# Patient Record
Sex: MALE | Race: WHITE | Employment: OTHER | URBAN - METROPOLITAN AREA
[De-identification: names, ages, dates, MRNs, and addresses within clinical notes are randomized per-mention and may not be internally consistent; named-entity substitution may affect disease eponyms.]

---

## 2017-07-30 ENCOUNTER — HOSPITAL ENCOUNTER (EMERGENCY)
Age: 72
Discharge: HOME | End: 2017-07-30
Attending: INTERNAL MEDICINE | Admitting: INTERNAL MEDICINE
Payer: MEDICARE

## 2017-07-30 VITALS
HEART RATE: 63 BPM | SYSTOLIC BLOOD PRESSURE: 108 MMHG | TEMPERATURE: 97.6 F | BODY MASS INDEX: 32.93 KG/M2 | DIASTOLIC BLOOD PRESSURE: 68 MMHG | HEIGHT: 70 IN | RESPIRATION RATE: 13 BRPM | OXYGEN SATURATION: 96 % | WEIGHT: 230 LBS

## 2017-07-30 DIAGNOSIS — E87.6 HYPOKALEMIA: Primary | ICD-10-CM

## 2017-07-30 DIAGNOSIS — Z79.01 ON CONTINUOUS ORAL ANTICOAGULATION: ICD-10-CM

## 2017-07-30 DIAGNOSIS — Z86.79 HISTORY OF ATRIAL FIBRILLATION: ICD-10-CM

## 2017-07-30 LAB
ALBUMIN SERPL BCP-MCNC: 4 G/DL (ref 3.4–5)
ALBUMIN/GLOB SERPL: 1.3 {RATIO} (ref 0.8–1.7)
ALP SERPL-CCNC: 51 U/L (ref 45–117)
ALT SERPL-CCNC: 23 U/L (ref 16–61)
ANION GAP BLD CALC-SCNC: 6 MMOL/L (ref 3–18)
AST SERPL W P-5'-P-CCNC: 14 U/L (ref 15–37)
ATRIAL RATE: 75 BPM
BASOPHILS # BLD AUTO: 0.1 K/UL (ref 0–0.06)
BASOPHILS # BLD: 1 % (ref 0–2)
BILIRUB SERPL-MCNC: 0.5 MG/DL (ref 0.2–1)
BUN SERPL-MCNC: 18 MG/DL (ref 7–18)
BUN/CREAT SERPL: 15 (ref 12–20)
CALCIUM SERPL-MCNC: 9.1 MG/DL (ref 8.5–10.1)
CALCULATED P AXIS, ECG09: 32 DEGREES
CALCULATED R AXIS, ECG10: -17 DEGREES
CALCULATED T AXIS, ECG11: 55 DEGREES
CHLORIDE SERPL-SCNC: 102 MMOL/L (ref 100–108)
CK MB CFR SERPL CALC: NORMAL % (ref 0–4)
CK MB SERPL-MCNC: <1 NG/ML (ref 5–25)
CK SERPL-CCNC: 118 U/L (ref 39–308)
CO2 SERPL-SCNC: 30 MMOL/L (ref 21–32)
CREAT SERPL-MCNC: 1.17 MG/DL (ref 0.6–1.3)
DIAGNOSIS, 93000: NORMAL
DIFFERENTIAL METHOD BLD: ABNORMAL
EOSINOPHIL # BLD: 0.3 K/UL (ref 0–0.4)
EOSINOPHIL NFR BLD: 3 % (ref 0–5)
ERYTHROCYTE [DISTWIDTH] IN BLOOD BY AUTOMATED COUNT: 13 % (ref 11.6–14.5)
GLOBULIN SER CALC-MCNC: 3 G/DL (ref 2–4)
GLUCOSE SERPL-MCNC: 91 MG/DL (ref 74–99)
HCT VFR BLD AUTO: 42.6 % (ref 36–48)
HGB BLD-MCNC: 15.4 G/DL (ref 13–16)
LYMPHOCYTES # BLD AUTO: 31 % (ref 21–52)
LYMPHOCYTES # BLD: 2.2 K/UL (ref 0.9–3.6)
MAGNESIUM SERPL-MCNC: 2.2 MG/DL (ref 1.6–2.6)
MCH RBC QN AUTO: 32.4 PG (ref 24–34)
MCHC RBC AUTO-ENTMCNC: 36.2 G/DL (ref 31–37)
MCV RBC AUTO: 89.5 FL (ref 74–97)
MONOCYTES # BLD: 0.5 K/UL (ref 0.05–1.2)
MONOCYTES NFR BLD AUTO: 7 % (ref 3–10)
NEUTS SEG # BLD: 4.2 K/UL (ref 1.8–8)
NEUTS SEG NFR BLD AUTO: 58 % (ref 40–73)
P-R INTERVAL, ECG05: 170 MS
PLATELET # BLD AUTO: 221 K/UL (ref 135–420)
PMV BLD AUTO: 9.4 FL (ref 9.2–11.8)
POTASSIUM SERPL-SCNC: 3 MMOL/L (ref 3.5–5.5)
PROT SERPL-MCNC: 7 G/DL (ref 6.4–8.2)
Q-T INTERVAL, ECG07: 436 MS
QRS DURATION, ECG06: 108 MS
QTC CALCULATION (BEZET), ECG08: 486 MS
RBC # BLD AUTO: 4.76 M/UL (ref 4.7–5.5)
SODIUM SERPL-SCNC: 138 MMOL/L (ref 136–145)
TROPONIN I SERPL-MCNC: <0.02 NG/ML (ref 0–0.06)
VENTRICULAR RATE, ECG03: 75 BPM
WBC # BLD AUTO: 7.3 K/UL (ref 4.6–13.2)

## 2017-07-30 PROCEDURE — 99283 EMERGENCY DEPT VISIT LOW MDM: CPT

## 2017-07-30 PROCEDURE — 85025 COMPLETE CBC W/AUTO DIFF WBC: CPT | Performed by: PHYSICIAN ASSISTANT

## 2017-07-30 PROCEDURE — 80053 COMPREHEN METABOLIC PANEL: CPT | Performed by: PHYSICIAN ASSISTANT

## 2017-07-30 PROCEDURE — 93005 ELECTROCARDIOGRAM TRACING: CPT

## 2017-07-30 PROCEDURE — 83735 ASSAY OF MAGNESIUM: CPT | Performed by: PHYSICIAN ASSISTANT

## 2017-07-30 PROCEDURE — 82550 ASSAY OF CK (CPK): CPT | Performed by: PHYSICIAN ASSISTANT

## 2017-07-30 PROCEDURE — 74011250637 HC RX REV CODE- 250/637: Performed by: PHYSICIAN ASSISTANT

## 2017-07-30 RX ORDER — GLUCOSAMINE SULFATE 1500 MG
1000 POWDER IN PACKET (EA) ORAL DAILY
COMMUNITY

## 2017-07-30 RX ORDER — POTASSIUM CHLORIDE 20 MEQ/1
40 TABLET, EXTENDED RELEASE ORAL
Status: COMPLETED | OUTPATIENT
Start: 2017-07-30 | End: 2017-07-30

## 2017-07-30 RX ADMIN — POTASSIUM CHLORIDE 40 MEQ: 20 TABLET, EXTENDED RELEASE ORAL at 20:49

## 2017-07-30 NOTE — ED TRIAGE NOTES
Pt states \"I had an episode of atrial fibrillation earlier today. I did what my doctor told me to and took extra medications and it went away, but it came back again and I couldn't do anything about it then. I think I'm okay right now, but it is very disconcerting\". Took second dose of regular amiodarone around [id number]. Cardiologist: Sharee Christian. Pt states normally the second dose of medication keeps it away. Sepsis Screening completed    (  )Patient meets SIRS criteria. (  x)Patient does not meet SIRS criteria.       SIRS Criteria is achieved when two or more of the following are present   Temperature < 96.8°F (36°C) or > 100.9°F (38.3°C)   Heart Rate > 90 beats per minute   Respiratory Rate > 20 breaths per minute   WBC count > 12,000 or <4,000 or > 10% bands

## 2017-07-30 NOTE — ED NOTES
Pt presents to the ER with complaints of episodes of Afib earlier today. Pt states that he has a history of Afib for which he is followed by Dr Sharee Christian. Pt states that he was told by his cardiologist that if he has an episode of Afib he is to take 1 additional dose of amiodarone. Pt states that he did this today which converted patient back to NSR but patient states \"I am just concerned as why I keep going into Afib without any triggers. \" Pt is resting in a position of comfort on stretcher with spouse at bedside. Pt on cardiac monitor showing NSR and will continue to monitor patient. Bed in lowest locked position, side rails up x 1 and call bell in reach of patient and patient instructed to use call bell for any assistance needed.

## 2017-07-30 NOTE — ED PROVIDER NOTES
[address]  EMERGENCY DEPARTMENT HISTORY AND PHYSICAL EXAM       Date: 7/30/2017   Patient Name: Mary Augustin   YOB: 1945  Medical Record Number: [id number]    History of Presenting Illness     Chief Complaint   Patient presents with    Irregular Heart Beat        History Provided By:  patient    Additional History: 7:08 PM  Mary Augustin is a 73 y.o. male with Hx of A-fib for over 12 years (pt is on Amiodarone) who presents to the emergency department C/O \"irregular heart beat\" onset 5 hours ago while at lunch (resolved in ED). Pt took his Amiodarone which resolved the sx's for a few hours and then again the irregular heartbeat started 2 hours ago which is when pt decided to come to ED. Pt reports the last time this happened, months ago, he took an extra dose of Amiodarone, as told to do by  (cardio), which made sx's worse this is why he did not take extra medication today. Pt is on Xarelto. Pt reports cardiac stress test done 2 months ago was normal. Pt denies CP or SOB. Primary Care Provider: Arias Espinosa MD   Specialist:  (cardio)    Past History     Past Medical History:   Past Medical History:   Diagnosis Date    Acid reflux     Arthritis     Atrial fibrillation ([address]     Paroxysmal    Cancer ([address]     skin    Chest pain 2/9/2011    Depression     DVT of lower extremity (deep venous thrombosis) ([address]     Right    Hyperlipidemia     Hypopotassemia 6/6/2013    Long term (current) use of anticoagulants     LVH (left ventricular hypertrophy)     Near syncope 3/30/2014    Other and unspecified hyperlipidemia 1/28/2014    SOB (shortness of breath) 2/10/2011        Past Surgical History:   Past Surgical History:   Procedure Laterality Date    HX CATARACT REMOVAL  4-28-15    left eye    HX HERNIA REPAIR      Inguinal and umbilical        Family History:   History reviewed. No pertinent family history.      Social History:   Social History   Substance Use Topics    Smoking status: Former Smoker     Packs/day: 0.50     Years: 20.00     Types: Cigarettes     Quit date: 2/10/2005    Smokeless tobacco: Never Used    Alcohol use No        Allergies: Allergies   Allergen Reactions    Statins-Hmg-Coa Reductase Inhibitors Other (comments)     Body aches and tiredness    Tylenol [Acetaminophen] Other (comments)     Body aches and tiredness        Review of Systems   Review of Systems   Respiratory: Negative for shortness of breath. Cardiovascular: Negative for chest pain. All other systems reviewed and are negative. Physical Exam  Vitals:    07/30/17 1747 07/30/17 2045   BP: 108/68    Pulse: 91 63   Resp: 20 13   Temp: 97.6 °F (36.4 °C)    SpO2: 98% 96%   Weight: 104.3 kg (230 lb)    Height: 5' 10\" (1.778 m)        Physical Exam   Nursing note and vitals reviewed. Vital signs and nursing notes reviewed. CONSTITUTIONAL: Alert. Well-appearing; well-nourished; in no apparent distress. HEAD: Normocephalic; atraumatic. EYES: PERRL; Conjunctiva clear. ENT: Moist mucus membranes. NECK: Supple; FROM without difficulty, non-tender; no cervical lymphadenopathy. CV: Normal S1, S2; no murmurs, rubs, or gallops. No chest wall tenderness. RESPIRATORY: Normal chest excursion with respiration; breath sounds clear and equal bilaterally; no wheezes, rhonchi, or rales. GI: Normal bowel sounds; non-distended; non-tender; no guarding or rigidity; no palpable organomegaly. No CVA tenderness. EXT: Normal ROM in all four extremities; non-tender to palpation. No edema. No calf tenderness. SKIN: Normal for age and race; warm; dry; good turgor; no apparent lesions or exudate. NEURO: A & O x3. Cranial nerves 2-12 intact. Motor 5/5 bilaterally. Sensation intact. PSYCH:  Mood and affect appropriate.         Diagnostic Study Results     Labs -      Recent Results (from the past 12 hour(s))   EKG, 12 LEAD, INITIAL    Collection Time: 07/30/17  5:56 PM   Result Value Ref Range    Ventricular Rate 75 BPM    Atrial Rate 75 BPM    P-R Interval 170 ms    QRS Duration 108 ms    Q-T Interval 436 ms    QTC Calculation (Bezet) 486 ms    Calculated P Axis 32 degrees    Calculated R Axis -17 degrees    Calculated T Axis 55 degrees    Diagnosis       Normal sinus rhythm  Inferior infarct , age undetermined  Abnormal ECG  When compared with ECG of 18-FEB-2014 07:23,  Inferior infarct is now present     CBC WITH AUTOMATED DIFF    Collection Time: 07/30/17  7:30 PM   Result Value Ref Range    WBC 7.3 4.6 - 13.2 K/uL    RBC 4.76 4.70 - 5.50 M/uL    HGB 15.4 13.0 - 16.0 g/dL    HCT 42.6 36.0 - 48.0 %    MCV 89.5 74.0 - 97.0 FL    MCH 32.4 24.0 - 34.0 PG    MCHC 36.2 31.0 - 37.0 g/dL    RDW 13.0 11.6 - 14.5 %    PLATELET 075 926 - 566 K/uL    MPV 9.4 9.2 - 11.8 FL    NEUTROPHILS 58 40 - 73 %    LYMPHOCYTES 31 21 - 52 %    MONOCYTES 7 3 - 10 %    EOSINOPHILS 3 0 - 5 %    BASOPHILS 1 0 - 2 %    ABS. NEUTROPHILS 4.2 1.8 - 8.0 K/UL    ABS. LYMPHOCYTES 2.2 0.9 - 3.6 K/UL    ABS. MONOCYTES 0.5 0.05 - 1.2 K/UL    ABS. EOSINOPHILS 0.3 0.0 - 0.4 K/UL    ABS. BASOPHILS 0.1 (H) 0.0 - 0.06 K/UL    DF AUTOMATED     METABOLIC PANEL, COMPREHENSIVE    Collection Time: 07/30/17  7:30 PM   Result Value Ref Range    Sodium 138 136 - 145 mmol/L    Potassium 3.0 (L) 3.5 - 5.5 mmol/L    Chloride 102 100 - 108 mmol/L    CO2 30 21 - 32 mmol/L    Anion gap 6 3.0 - 18 mmol/L    Glucose 91 74 - 99 mg/dL    BUN 18 7.0 - 18 MG/DL    Creatinine 1.17 0.6 - 1.3 MG/DL    BUN/Creatinine ratio 15 12 - 20      GFR est AA >60 >60 ml/min/1.73m2    GFR est non-AA >60 >60 ml/min/1.73m2    Calcium 9.1 8.5 - 10.1 MG/DL    Bilirubin, total 0.5 0.2 - 1.0 MG/DL    ALT (SGPT) 23 16 - 61 U/L    AST (SGOT) 14 (L) 15 - 37 U/L    Alk.  phosphatase 51 45 - 117 U/L    Protein, total 7.0 6.4 - 8.2 g/dL    Albumin 4.0 3.4 - 5.0 g/dL    Globulin 3.0 2.0 - 4.0 g/dL    A-G Ratio 1.3 0.8 - 1.7     CARDIAC PANEL,(CK, CKMB & TROPONIN)    Collection Time: 07/30/17  7:30 PM   Result Value Ref Range     39 - 308 U/L    CK - MB <1.0 <3.6 ng/ml    CK-MB Index  0.0 - 4.0 %     CALCULATION NOT PERFORMED WHEN RESULT IS BELOW LINEAR LIMIT    Troponin-I, Qt. <0.02 0.00 - 0.06 NG/ML   MAGNESIUM    Collection Time: 07/30/17  7:30 PM   Result Value Ref Range    Magnesium 2.2 1.6 - 2.6 mg/dL       Radiologic Studies -  The following have been ordered and reviewed:  No orders to display           Medical Decision Making   I am the first provider for this patient. I reviewed the vital signs, available nursing notes, past medical history, past surgical history, family history and social history. Vital Signs-Reviewed the patient's vital signs. Patient Vitals for the past 12 hrs:   Temp Pulse Resp BP SpO2   07/30/17 2045 - 63 13 - 96 %   07/30/17 1747 97.6 °F (36.4 °C) 91 20 108/68 98 %     DDx: paroxysmal atrial fibrillation, electrolyte abnormality, medication adverse effect    Pulse Oximetry Analysis - Normal 98% on RA     Cardiac Monitor:   Rate: 75 bpm  Rhythm: Normal Sinus Rhythm     EKG interpretation: (Preliminary)  Rhythm: NSR. Rate (approx.): 75 bpm. ; inferior infarct. No stemi. EKG read by Tree Falcon MD  at 17:56    Procedures:   Procedures    ED Course:  7:08 M  Initial assessment performed. The patients presenting problems have been discussed, and they are in agreement with the care plan formulated and outlined with them. I have encouraged them to ask questions as they arise throughout their visit. Medications Given in the ED:  Medications   potassium chloride (K-DUR, KLOR-CON) SR tablet 40 mEq (40 mEq Oral Given 7/30/17 2049)       Discharge Note:  8:22 PM  Pt has been reexamined. Patient has no new complaints, changes, or physical findings. Care plan outlined and precautions discussed. Results were reviewed with the patient. All medications were reviewed with the patient; will d/c home.  All of pt's questions and concerns were addressed. Patient was instructed and agrees to follow up with cardiology, as well as to return to the ED upon further deterioration. Patient is ready to go home. Diagnosis     Clinical Impression:     1. Hypokalemia    2. History of atrial fibrillation    3. On continuous oral anticoagulation         Discussion:    Follow-up Information     Follow up With Details Comments [address], [place] Schedule an appointment as soon as possible for a visit in 1 day  [facility]  [address] [facility] EMERGENCY DEPT  As needed, If symptoms worsen [address] Cecily Umaña  [phone]          Current Discharge Medication List          _______________________________   Attestations: This note is prepared by Amado Newton , acting as a Scribe for [employer]  on 7:01 PM on 7/30/2017 . [employer] : The scribe's documentation has been prepared under my direction and personally reviewed by me in its entirety. I was personally available for consultation in the emergency department. I have reviewed the chart and agree with the documentation recorded by the [facility], including the assessment, treatment plan, and disposition.    _______________________________

## 2017-07-31 NOTE — DISCHARGE INSTRUCTIONS
Hypokalemia: Care Instructions  Your Care Instructions  Hypokalemia (say \"bx-ke-lwf-BIBIANA-jerson-uh\") is a low level of potassium. The heart, muscles, kidneys, and nervous system all need potassium to work well. This problem has many different causes. Kidney problems, diet, and medicines like diuretics and laxatives can cause it. So can vomiting or diarrhea. In some cases, cancer is the cause. Your doctor may do tests to find the cause of your low potassium levels. You may need medicines to bring your potassium levels back to normal. You may also need regular blood tests to check your potassium. If you have very low potassium, you may need intravenous (IV) medicines. You also may need tests to check the electrical activity of your heart. Heart problems caused by low potassium levels can be very serious. Follow-up care is a key part of your treatment and safety. Be sure to make and go to all appointments, and call your doctor if you are having problems. It's also a good idea to know your test results and keep a list of the medicines you take. How can you care for yourself at home? · If your doctor recommends it, eat foods that have a lot of potassium. These include fresh fruits, juices, and vegetables. They also include nuts, beans, and milk. · Be safe with medicines. If your doctor prescribes medicines or potassium supplements, take them exactly as directed. Call your doctor if you have any problems with your medicines. · Get your potassium levels tested as often as your doctor tells you. When should you call for help? Call 911 anytime you think you may need emergency care. For example, call if:  · You feel like your heart is missing beats. Heart problems caused by low potassium can cause death. · You passed out (lost consciousness). · You have a seizure. Call your doctor now or seek immediate medical care if:  · You feel weak or unusually tired. · You have severe arm or leg cramps.   · You have tingling or numbness. · You feel sick to your stomach, or you vomit. · You have belly cramps. · You feel bloated or constipated. · You have to urinate a lot. · You feel very thirsty most of the time. · You are dizzy or lightheaded, or you feel like you may faint. · You feel depressed, or you lose touch with reality. Watch closely for changes in your health, and be sure to contact your doctor if:  · You do not get better as expected. Where can you learn more? Go to http://rebecca-anabel.info/. Enter G358 in the search box to learn more about \"Hypokalemia: Care Instructions. \"  Current as of: July 28, 2016  Content Version: 11.3  © 9394-4132 [employer]. Care instructions adapted under license by [employer] (which disclaims liability or warranty for this information). If you have questions about a medical condition or this instruction, always ask your healthcare professional. [address] any warranty or liability for your use of this information.

## 2017-08-11 ENCOUNTER — HOSPITAL ENCOUNTER (OUTPATIENT)
Dept: PREADMISSION TESTING | Age: 72
Discharge: HOME | End: 2017-08-11
Payer: MEDICARE

## 2017-08-11 LAB
ANION GAP BLD CALC-SCNC: 9 MMOL/L (ref 3–18)
BUN SERPL-MCNC: 19 MG/DL (ref 7–18)
BUN/CREAT SERPL: 17 (ref 12–20)
CALCIUM SERPL-MCNC: 9.3 MG/DL (ref 8.5–10.1)
CHLORIDE SERPL-SCNC: 100 MMOL/L (ref 100–108)
CO2 SERPL-SCNC: 31 MMOL/L (ref 21–32)
CREAT SERPL-MCNC: 1.1 MG/DL (ref 0.6–1.3)
GLUCOSE SERPL-MCNC: 102 MG/DL (ref 74–99)
MAGNESIUM SERPL-MCNC: 2.1 MG/DL (ref 1.6–2.6)
POTASSIUM SERPL-SCNC: 3.9 MMOL/L (ref 3.5–5.5)
SODIUM SERPL-SCNC: 140 MMOL/L (ref 136–145)

## 2017-08-11 PROCEDURE — 80048 BASIC METABOLIC PNL TOTAL CA: CPT | Performed by: INTERNAL MEDICINE

## 2017-08-11 PROCEDURE — 83735 ASSAY OF MAGNESIUM: CPT | Performed by: INTERNAL MEDICINE

## 2017-08-11 PROCEDURE — 36415 COLL VENOUS BLD VENIPUNCTURE: CPT | Performed by: INTERNAL MEDICINE

## 2017-08-14 LAB
FAX TO INFO,FAXT: NORMAL
FAX TO NUMBER,FAXN: NORMAL

## 2018-06-05 ENCOUNTER — HOSPITAL ENCOUNTER (OUTPATIENT)
Dept: LAB | Age: 73
Discharge: HOME | End: 2018-06-05
Payer: MEDICARE

## 2018-06-05 LAB
ALBUMIN SERPL-MCNC: 3.9 G/DL (ref 3.4–5)
ALBUMIN/GLOB SERPL: 1.2 {RATIO} (ref 0.8–1.7)
ALP SERPL-CCNC: 59 U/L (ref 45–117)
ALT SERPL-CCNC: 24 U/L (ref 16–61)
ANION GAP SERPL CALC-SCNC: 9 MMOL/L (ref 3–18)
AST SERPL-CCNC: 15 U/L (ref 15–37)
BASOPHILS # BLD: 0.1 K/UL (ref 0–0.06)
BASOPHILS NFR BLD: 1 % (ref 0–2)
BILIRUB SERPL-MCNC: 0.8 MG/DL (ref 0.2–1)
BUN SERPL-MCNC: 19 MG/DL (ref 7–18)
BUN/CREAT SERPL: 13 (ref 12–20)
CALCIUM SERPL-MCNC: 9.1 MG/DL (ref 8.5–10.1)
CHLORIDE SERPL-SCNC: 104 MMOL/L (ref 100–108)
CO2 SERPL-SCNC: 31 MMOL/L (ref 21–32)
CREAT SERPL-MCNC: 1.45 MG/DL (ref 0.6–1.3)
DIFFERENTIAL METHOD BLD: ABNORMAL
EOSINOPHIL # BLD: 0.1 K/UL (ref 0–0.4)
EOSINOPHIL NFR BLD: 2 % (ref 0–5)
ERYTHROCYTE [DISTWIDTH] IN BLOOD BY AUTOMATED COUNT: 13.3 % (ref 11.6–14.5)
GLOBULIN SER CALC-MCNC: 3.3 G/DL (ref 2–4)
GLUCOSE SERPL-MCNC: 114 MG/DL (ref 74–99)
HCT VFR BLD AUTO: 49.2 % (ref 36–48)
HGB BLD-MCNC: 16.8 G/DL (ref 13–16)
LYMPHOCYTES # BLD: 1.9 K/UL (ref 0.9–3.6)
LYMPHOCYTES NFR BLD: 28 % (ref 21–52)
MCH RBC QN AUTO: 31.6 PG (ref 24–34)
MCHC RBC AUTO-ENTMCNC: 34.1 G/DL (ref 31–37)
MCV RBC AUTO: 92.7 FL (ref 74–97)
MONOCYTES # BLD: 0.4 K/UL (ref 0.05–1.2)
MONOCYTES NFR BLD: 6 % (ref 3–10)
NEUTS SEG # BLD: 4.2 K/UL (ref 1.8–8)
NEUTS SEG NFR BLD: 63 % (ref 40–73)
PLATELET # BLD AUTO: 223 K/UL (ref 135–420)
PMV BLD AUTO: 9.7 FL (ref 9.2–11.8)
POTASSIUM SERPL-SCNC: 3.3 MMOL/L (ref 3.5–5.5)
PROT SERPL-MCNC: 7.2 G/DL (ref 6.4–8.2)
RBC # BLD AUTO: 5.31 M/UL (ref 4.7–5.5)
SODIUM SERPL-SCNC: 144 MMOL/L (ref 136–145)
T3FREE SERPL-MCNC: 2.4 PG/ML (ref 2.18–3.98)
T4 FREE SERPL-MCNC: 1.1 NG/DL (ref 0.7–1.5)
TSH SERPL DL<=0.05 MIU/L-ACNC: 2.51 UIU/ML (ref 0.36–3.74)
WBC # BLD AUTO: 6.7 K/UL (ref 4.6–13.2)

## 2018-06-05 PROCEDURE — 84443 ASSAY THYROID STIM HORMONE: CPT | Performed by: INTERNAL MEDICINE

## 2018-06-05 PROCEDURE — 85025 COMPLETE CBC W/AUTO DIFF WBC: CPT | Performed by: INTERNAL MEDICINE

## 2018-06-05 PROCEDURE — 84439 ASSAY OF FREE THYROXINE: CPT | Performed by: INTERNAL MEDICINE

## 2018-06-05 PROCEDURE — 36415 COLL VENOUS BLD VENIPUNCTURE: CPT | Performed by: INTERNAL MEDICINE

## 2018-06-05 PROCEDURE — 84481 FREE ASSAY (FT-3): CPT | Performed by: INTERNAL MEDICINE

## 2018-06-05 PROCEDURE — 80053 COMPREHEN METABOLIC PANEL: CPT | Performed by: INTERNAL MEDICINE

## 2018-06-06 LAB
FAX TO INFO,FAXT: NORMAL
FAX TO NUMBER,FAXN: NORMAL

## 2019-04-16 ENCOUNTER — HOSPITAL ENCOUNTER (OUTPATIENT)
Dept: LAB | Age: 74
Discharge: HOME | End: 2019-04-16
Payer: MEDICARE

## 2019-04-16 LAB
T4 FREE SERPL-MCNC: 1 NG/DL (ref 0.7–1.5)
TSH SERPL DL<=0.05 MIU/L-ACNC: 2 UIU/ML (ref 0.36–3.74)

## 2019-04-16 PROCEDURE — 36415 COLL VENOUS BLD VENIPUNCTURE: CPT

## 2019-04-16 PROCEDURE — 84480 ASSAY TRIIODOTHYRONINE (T3): CPT

## 2019-04-16 PROCEDURE — 84439 ASSAY OF FREE THYROXINE: CPT

## 2019-04-16 PROCEDURE — 84443 ASSAY THYROID STIM HORMONE: CPT

## 2019-04-17 LAB — T3 SERPL-MCNC: 76 NG/DL (ref 71–180)

## 2019-04-18 LAB
FAX TO INFO,FAXT: NORMAL
FAX TO NUMBER,FAXN: NORMAL

## 2020-11-18 ENCOUNTER — HOSPITAL ENCOUNTER (OUTPATIENT)
Dept: LAB | Age: 75
Discharge: HOME | End: 2020-11-18
Payer: MEDICARE

## 2020-11-18 LAB
ALBUMIN SERPL-MCNC: 4.2 G/DL (ref 3.4–5)
ALBUMIN/GLOB SERPL: 1.4 {RATIO} (ref 0.8–1.7)
ALP SERPL-CCNC: 72 U/L (ref 45–117)
ALT SERPL-CCNC: 24 U/L (ref 16–61)
ANION GAP SERPL CALC-SCNC: 4 MMOL/L (ref 3–18)
AST SERPL-CCNC: 16 U/L (ref 10–38)
BILIRUB SERPL-MCNC: 0.6 MG/DL (ref 0.2–1)
BUN SERPL-MCNC: 21 MG/DL (ref 7–18)
BUN/CREAT SERPL: 20 (ref 12–20)
CALCIUM SERPL-MCNC: 9.7 MG/DL (ref 8.5–10.1)
CHLORIDE SERPL-SCNC: 104 MMOL/L (ref 100–111)
CO2 SERPL-SCNC: 32 MMOL/L (ref 21–32)
CREAT SERPL-MCNC: 1.04 MG/DL (ref 0.6–1.3)
GLOBULIN SER CALC-MCNC: 3.1 G/DL (ref 2–4)
GLUCOSE SERPL-MCNC: 110 MG/DL (ref 74–99)
POTASSIUM SERPL-SCNC: 4.3 MMOL/L (ref 3.5–5.5)
PROT SERPL-MCNC: 7.3 G/DL (ref 6.4–8.2)
SODIUM SERPL-SCNC: 140 MMOL/L (ref 136–145)
T4 FREE SERPL-MCNC: 1 NG/DL (ref 0.7–1.5)
TSH SERPL DL<=0.05 MIU/L-ACNC: 3.03 UIU/ML (ref 0.36–3.74)

## 2020-11-18 PROCEDURE — 36415 COLL VENOUS BLD VENIPUNCTURE: CPT

## 2020-11-18 PROCEDURE — 84480 ASSAY TRIIODOTHYRONINE (T3): CPT

## 2020-11-18 PROCEDURE — 80053 COMPREHEN METABOLIC PANEL: CPT

## 2020-11-18 PROCEDURE — 84439 ASSAY OF FREE THYROXINE: CPT

## 2020-11-18 PROCEDURE — 84443 ASSAY THYROID STIM HORMONE: CPT

## 2020-11-19 LAB
FAX TO INFO,FAXT: NORMAL
FAX TO NUMBER,FAXN: NORMAL
T3 SERPL-MCNC: 100 NG/DL (ref 71–180)

## 2020-12-22 ENCOUNTER — TRANSCRIBE ORDER (OUTPATIENT)
Dept: REGISTRATION | Age: 75
End: 2020-12-22

## 2020-12-22 ENCOUNTER — HOSPITAL ENCOUNTER (OUTPATIENT)
Dept: PREADMISSION TESTING | Age: 75
Discharge: HOME | End: 2020-12-22
Payer: MEDICARE

## 2020-12-22 DIAGNOSIS — M16.11 PRIMARY OSTEOARTHRITIS OF RIGHT HIP: Primary | ICD-10-CM

## 2020-12-22 DIAGNOSIS — M16.11 PRIMARY OSTEOARTHRITIS OF RIGHT HIP: ICD-10-CM

## 2020-12-22 LAB
ALBUMIN SERPL-MCNC: 4.1 G/DL (ref 3.4–5)
ALBUMIN/GLOB SERPL: 1.3 {RATIO} (ref 0.8–1.7)
ALP SERPL-CCNC: 68 U/L (ref 45–117)
ALT SERPL-CCNC: 24 U/L (ref 16–61)
ANION GAP SERPL CALC-SCNC: 9 MMOL/L (ref 3–18)
APPEARANCE UR: CLEAR
APTT PPP: 34 SEC (ref 23–36.4)
AST SERPL-CCNC: 11 U/L (ref 10–38)
BACTERIA URNS QL MICRO: ABNORMAL /HPF
BASOPHILS # BLD: 0.1 K/UL (ref 0–0.1)
BASOPHILS NFR BLD: 1 % (ref 0–2)
BILIRUB SERPL-MCNC: 0.9 MG/DL (ref 0.2–1)
BILIRUB UR QL: NEGATIVE
BUN SERPL-MCNC: 23 MG/DL (ref 7–18)
BUN/CREAT SERPL: 23 (ref 12–20)
CALCIUM SERPL-MCNC: 9.8 MG/DL (ref 8.5–10.1)
CHLORIDE SERPL-SCNC: 103 MMOL/L (ref 100–111)
CO2 SERPL-SCNC: 27 MMOL/L (ref 21–32)
COLOR UR: YELLOW
CREAT SERPL-MCNC: 1 MG/DL (ref 0.6–1.3)
DIFFERENTIAL METHOD BLD: ABNORMAL
EOSINOPHIL # BLD: 0.2 K/UL (ref 0–0.4)
EOSINOPHIL NFR BLD: 2 % (ref 0–5)
EPITH CASTS URNS QL MICRO: 0 /LPF (ref 0–5)
ERYTHROCYTE [DISTWIDTH] IN BLOOD BY AUTOMATED COUNT: 12.2 % (ref 11.6–14.5)
ERYTHROCYTE [SEDIMENTATION RATE] IN BLOOD: 11 MM/HR (ref 0–20)
EST. AVERAGE GLUCOSE BLD GHB EST-MCNC: 111 MG/DL
GLOBULIN SER CALC-MCNC: 3.1 G/DL (ref 2–4)
GLUCOSE SERPL-MCNC: 96 MG/DL (ref 74–99)
GLUCOSE UR STRIP.AUTO-MCNC: NEGATIVE MG/DL
HBA1C MFR BLD: 5.5 % (ref 4.2–5.6)
HCT VFR BLD AUTO: 41.7 % (ref 36–48)
HGB BLD-MCNC: 14.7 G/DL (ref 13–16)
HGB UR QL STRIP: NEGATIVE
INR PPP: 1.1 (ref 0.8–1.2)
KETONES UR QL STRIP.AUTO: NEGATIVE MG/DL
LEUKOCYTE ESTERASE UR QL STRIP.AUTO: ABNORMAL
LYMPHOCYTES # BLD: 2.3 K/UL (ref 0.9–3.6)
LYMPHOCYTES NFR BLD: 33 % (ref 21–52)
MCH RBC QN AUTO: 32 PG (ref 24–34)
MCHC RBC AUTO-ENTMCNC: 35.3 G/DL (ref 31–37)
MCV RBC AUTO: 90.8 FL (ref 74–97)
MONOCYTES # BLD: 0.5 K/UL (ref 0.05–1.2)
MONOCYTES NFR BLD: 7 % (ref 3–10)
NEUTS SEG # BLD: 3.9 K/UL (ref 1.8–8)
NEUTS SEG NFR BLD: 57 % (ref 40–73)
NITRITE UR QL STRIP.AUTO: NEGATIVE
PH UR STRIP: 6.5 [PH] (ref 5–8)
PLATELET # BLD AUTO: 244 K/UL (ref 135–420)
PMV BLD AUTO: 9.7 FL (ref 9.2–11.8)
POTASSIUM SERPL-SCNC: 3.6 MMOL/L (ref 3.5–5.5)
PROT SERPL-MCNC: 7.2 G/DL (ref 6.4–8.2)
PROT UR STRIP-MCNC: NEGATIVE MG/DL
PROTHROMBIN TIME: 13.7 SEC (ref 11.5–15.2)
RBC # BLD AUTO: 4.59 M/UL (ref 4.7–5.5)
RBC #/AREA URNS HPF: ABNORMAL /HPF (ref 0–5)
SODIUM SERPL-SCNC: 139 MMOL/L (ref 136–145)
SP GR UR REFRACTOMETRY: 1.02 (ref 1–1.03)
UROBILINOGEN UR QL STRIP.AUTO: 1 EU/DL (ref 0.2–1)
WBC # BLD AUTO: 6.8 K/UL (ref 4.6–13.2)
WBC URNS QL MICRO: ABNORMAL /HPF (ref 0–5)

## 2020-12-22 PROCEDURE — 36415 COLL VENOUS BLD VENIPUNCTURE: CPT

## 2020-12-22 PROCEDURE — 85730 THROMBOPLASTIN TIME PARTIAL: CPT

## 2020-12-22 PROCEDURE — 81001 URINALYSIS AUTO W/SCOPE: CPT

## 2020-12-22 PROCEDURE — 80053 COMPREHEN METABOLIC PANEL: CPT

## 2020-12-22 PROCEDURE — 85025 COMPLETE CBC W/AUTO DIFF WBC: CPT

## 2020-12-22 PROCEDURE — 85652 RBC SED RATE AUTOMATED: CPT

## 2020-12-22 PROCEDURE — 85610 PROTHROMBIN TIME: CPT

## 2020-12-22 PROCEDURE — 83036 HEMOGLOBIN GLYCOSYLATED A1C: CPT

## 2020-12-24 LAB
BACTERIA SPEC CULT: NORMAL
BACTERIA SPEC CULT: NORMAL
SERVICE CMNT-IMP: NORMAL

## 2020-12-29 ENCOUNTER — HOSPITAL ENCOUNTER (OUTPATIENT)
Dept: PREADMISSION TESTING | Age: 75
Discharge: HOME | End: 2020-12-29
Payer: MEDICARE

## 2020-12-29 PROCEDURE — 87635 SARS-COV-2 COVID-19 AMP PRB: CPT

## 2020-12-30 LAB — SARS-COV-2, COV2NT: NOT DETECTED

## 2021-01-07 ENCOUNTER — HOSPITAL ENCOUNTER (OUTPATIENT)
Dept: LAB | Age: 76
Discharge: HOME | End: 2021-01-07
Payer: MEDICARE

## 2021-01-07 LAB
BASOPHILS # BLD: 0.1 K/UL (ref 0–0.1)
BASOPHILS NFR BLD: 1 % (ref 0–2)
DIFFERENTIAL METHOD BLD: ABNORMAL
EOSINOPHIL # BLD: 0.3 K/UL (ref 0–0.4)
EOSINOPHIL NFR BLD: 5 % (ref 0–5)
ERYTHROCYTE [DISTWIDTH] IN BLOOD BY AUTOMATED COUNT: 12.6 % (ref 11.6–14.5)
HCT VFR BLD AUTO: 42.5 % (ref 36–48)
HGB BLD-MCNC: 14.3 G/DL (ref 13–16)
INR PPP: 1.1 (ref 0.8–1.2)
LYMPHOCYTES # BLD: 1.8 K/UL (ref 0.9–3.6)
LYMPHOCYTES NFR BLD: 26 % (ref 21–52)
MCH RBC QN AUTO: 32 PG (ref 24–34)
MCHC RBC AUTO-ENTMCNC: 33.6 G/DL (ref 31–37)
MCV RBC AUTO: 95.1 FL (ref 74–97)
MONOCYTES # BLD: 0.6 K/UL (ref 0.05–1.2)
MONOCYTES NFR BLD: 8 % (ref 3–10)
NEUTS SEG # BLD: 4.1 K/UL (ref 1.8–8)
NEUTS SEG NFR BLD: 60 % (ref 40–73)
PLATELET # BLD AUTO: 232 K/UL (ref 135–420)
PMV BLD AUTO: 9.7 FL (ref 9.2–11.8)
PROTHROMBIN TIME: 13.8 SEC (ref 11.5–15.2)
RBC # BLD AUTO: 4.47 M/UL (ref 4.7–5.5)
WBC # BLD AUTO: 6.8 K/UL (ref 4.6–13.2)

## 2021-01-07 PROCEDURE — 85610 PROTHROMBIN TIME: CPT

## 2021-01-07 PROCEDURE — 85025 COMPLETE CBC W/AUTO DIFF WBC: CPT

## 2021-01-07 PROCEDURE — 36415 COLL VENOUS BLD VENIPUNCTURE: CPT

## 2021-01-12 LAB
FAX TO INFO,FAXT: NORMAL
FAX TO NUMBER,FAXN: NORMAL

## 2021-01-27 ENCOUNTER — HOSPITAL ENCOUNTER (OUTPATIENT)
Age: 76
Setting detail: OUTPATIENT SURGERY
Discharge: HOME | End: 2021-01-27
Attending: INTERNAL MEDICINE | Admitting: INTERNAL MEDICINE
Payer: MEDICARE

## 2021-01-27 VITALS
TEMPERATURE: 98 F | HEART RATE: 68 BPM | OXYGEN SATURATION: 96 % | HEIGHT: 69 IN | RESPIRATION RATE: 20 BRPM | WEIGHT: 247.13 LBS | SYSTOLIC BLOOD PRESSURE: 121 MMHG | BODY MASS INDEX: 36.6 KG/M2 | DIASTOLIC BLOOD PRESSURE: 73 MMHG

## 2021-01-27 DIAGNOSIS — I20.9 ANGINA, CLASS II (HCC): ICD-10-CM

## 2021-01-27 LAB
ALBUMIN SERPL-MCNC: 4 G/DL (ref 3.4–5)
ALBUMIN/GLOB SERPL: 1.3 {RATIO} (ref 0.8–1.7)
ALP SERPL-CCNC: 70 U/L (ref 45–117)
ALT SERPL-CCNC: 33 U/L (ref 16–61)
ANION GAP SERPL CALC-SCNC: 6 MMOL/L (ref 3–18)
AST SERPL-CCNC: 20 U/L (ref 10–38)
BILIRUB SERPL-MCNC: 0.5 MG/DL (ref 0.2–1)
BUN SERPL-MCNC: 18 MG/DL (ref 7–18)
BUN/CREAT SERPL: 16 (ref 12–20)
CALCIUM SERPL-MCNC: 9.3 MG/DL (ref 8.5–10.1)
CHLORIDE SERPL-SCNC: 105 MMOL/L (ref 100–111)
CO2 SERPL-SCNC: 29 MMOL/L (ref 21–32)
CREAT SERPL-MCNC: 1.15 MG/DL (ref 0.6–1.3)
GLOBULIN SER CALC-MCNC: 3 G/DL (ref 2–4)
GLUCOSE SERPL-MCNC: 110 MG/DL (ref 74–99)
POTASSIUM SERPL-SCNC: 3.5 MMOL/L (ref 3.5–5.5)
PROT SERPL-MCNC: 7 G/DL (ref 6.4–8.2)
SODIUM SERPL-SCNC: 140 MMOL/L (ref 136–145)

## 2021-01-27 PROCEDURE — 74011000636 HC RX REV CODE- 636: Performed by: INTERNAL MEDICINE

## 2021-01-27 PROCEDURE — 77030013797 HC KT TRNSDUC PRSSR EDWD -A: Performed by: INTERNAL MEDICINE

## 2021-01-27 PROCEDURE — 77030008543 HC TBNG MON PRSS MRTM -A: Performed by: INTERNAL MEDICINE

## 2021-01-27 PROCEDURE — C1894 INTRO/SHEATH, NON-LASER: HCPCS | Performed by: INTERNAL MEDICINE

## 2021-01-27 PROCEDURE — 99152 MOD SED SAME PHYS/QHP 5/>YRS: CPT | Performed by: INTERNAL MEDICINE

## 2021-01-27 PROCEDURE — 99153 MOD SED SAME PHYS/QHP EA: CPT | Performed by: INTERNAL MEDICINE

## 2021-01-27 PROCEDURE — 93458 L HRT ARTERY/VENTRICLE ANGIO: CPT | Performed by: INTERNAL MEDICINE

## 2021-01-27 PROCEDURE — C1769 GUIDE WIRE: HCPCS | Performed by: INTERNAL MEDICINE

## 2021-01-27 PROCEDURE — 74011250636 HC RX REV CODE- 250/636: Performed by: INTERNAL MEDICINE

## 2021-01-27 PROCEDURE — 77030004522 HC CATH ANGI DX EXPO BSC -A: Performed by: INTERNAL MEDICINE

## 2021-01-27 PROCEDURE — 77030010221 HC SPLNT WR POS TELE -B: Performed by: INTERNAL MEDICINE

## 2021-01-27 PROCEDURE — 77030029997 HC DEV COM RDL R BND TELE -B: Performed by: INTERNAL MEDICINE

## 2021-01-27 PROCEDURE — 74011000250 HC RX REV CODE- 250: Performed by: INTERNAL MEDICINE

## 2021-01-27 PROCEDURE — 77030016699 HC CATH ANGI DX INFN1 CARD -A: Performed by: INTERNAL MEDICINE

## 2021-01-27 PROCEDURE — 80053 COMPREHEN METABOLIC PANEL: CPT

## 2021-01-27 RX ORDER — SODIUM CHLORIDE 9 MG/ML
75 INJECTION, SOLUTION INTRAVENOUS CONTINUOUS
Status: DISPENSED | OUTPATIENT
Start: 2021-01-27 | End: 2021-01-27

## 2021-01-27 RX ORDER — SODIUM CHLORIDE 0.9 % (FLUSH) 0.9 %
5-40 SYRINGE (ML) INJECTION EVERY 8 HOURS
Status: DISCONTINUED | OUTPATIENT
Start: 2021-01-27 | End: 2021-01-27 | Stop reason: HOSPADM

## 2021-01-27 RX ORDER — VERAPAMIL HYDROCHLORIDE 2.5 MG/ML
INJECTION, SOLUTION INTRAVENOUS AS NEEDED
Status: DISCONTINUED | OUTPATIENT
Start: 2021-01-27 | End: 2021-01-27 | Stop reason: HOSPADM

## 2021-01-27 RX ORDER — FENTANYL CITRATE 50 UG/ML
INJECTION, SOLUTION INTRAMUSCULAR; INTRAVENOUS AS NEEDED
Status: DISCONTINUED | OUTPATIENT
Start: 2021-01-27 | End: 2021-01-27 | Stop reason: HOSPADM

## 2021-01-27 RX ORDER — AMOXICILLIN 250 MG
2 CAPSULE ORAL 2 TIMES DAILY
COMMUNITY
End: 2021-02-08 | Stop reason: CLARIF

## 2021-01-27 RX ORDER — MIDAZOLAM HYDROCHLORIDE 1 MG/ML
INJECTION, SOLUTION INTRAMUSCULAR; INTRAVENOUS AS NEEDED
Status: DISCONTINUED | OUTPATIENT
Start: 2021-01-27 | End: 2021-01-27 | Stop reason: HOSPADM

## 2021-01-27 RX ORDER — HEPARIN SODIUM 1000 [USP'U]/ML
INJECTION, SOLUTION INTRAVENOUS; SUBCUTANEOUS AS NEEDED
Status: DISCONTINUED | OUTPATIENT
Start: 2021-01-27 | End: 2021-01-27 | Stop reason: HOSPADM

## 2021-01-27 RX ORDER — LIDOCAINE HYDROCHLORIDE 10 MG/ML
INJECTION INFILTRATION; PERINEURAL AS NEEDED
Status: DISCONTINUED | OUTPATIENT
Start: 2021-01-27 | End: 2021-01-27 | Stop reason: HOSPADM

## 2021-01-27 RX ORDER — SODIUM CHLORIDE 0.9 % (FLUSH) 0.9 %
5-40 SYRINGE (ML) INJECTION AS NEEDED
Status: DISCONTINUED | OUTPATIENT
Start: 2021-01-27 | End: 2021-01-27 | Stop reason: HOSPADM

## 2021-01-27 RX ORDER — HEPARIN SODIUM 200 [USP'U]/100ML
INJECTION, SOLUTION INTRAVENOUS
Status: COMPLETED | OUTPATIENT
Start: 2021-01-27 | End: 2021-01-27

## 2021-01-27 RX ADMIN — SODIUM CHLORIDE 75 ML/HR: 900 INJECTION, SOLUTION INTRAVENOUS at 08:54

## 2021-01-27 NOTE — H&P
Date of Surgery Update:  Sienna Jeronimo was seen and examined. History and physical has been reviewed. The patient has been examined. There have been no significant clinical changes since the completion of the originally dated History and Physical.      Patient assessed and is candidate for moderate sedation.       Signed By: Valentina Rodriguez MD     January 27, 2021 10:30 AM

## 2021-01-27 NOTE — Clinical Note
TRANSFER - OUT REPORT:     Verbal report given to: Shawna Kaye RN. Report consisted of patient's Situation, Background, Assessment and   Recommendations(SBAR). Opportunity for questions and clarification was provided. Patient transported with a Cardiac Cath Tech / Patient Care Tech. Patient transported to: CARE UNIT.

## 2021-01-27 NOTE — PROGRESS NOTES
Pt is all prepped and ready for procedure. 1155 Pt back to care unit, awake and alert and tolerated procedure well. TR band to lt wrist with immobilizer in place. No noted bleeding nor hematoma. Precautions reinforced. 1300 Tr band removed and tolerated well. Tegaderm dressing applied to site. No bleeding nor hematoma. Lt wrist immobilizer back in place. 1400 Discharge instructions reviewed with pt and spouse and they verbalized all understandings. 46 Pt escorted to car and left with spouse in stable condition.

## 2021-01-27 NOTE — Clinical Note
TRANSFER - IN REPORT:     Verbal report received from: Care unit. Report consisted of patient's Situation, Background, Assessment and   Recommendations(SBAR). Opportunity for questions and clarification was provided. Assessment completed upon patient's arrival to unit and care assumed. Patient transported with a Registered Nurse.

## 2021-01-27 NOTE — DISCHARGE SUMMARY
Discharge Summary    Patient: Fernando Grossman MRN: [id number]  CSN: [id number]    YOB: 1945  Age: 70 y.o. Sex: male    DOA: 1/27/2021 LOS:  LOS: 0 days   Discharge Date:      Primary Care Provider:  Tate Conner MD    Admission Diagnoses: Angina, class II ([address] [I20.9], Abnormal stress test    Discharge Diagnoses:  Paroxysmal atrial fibrillation   Hospital Problems  Date Reviewed: 8/19/2015    None          Discharge Condition:  stable    Discharge Medications:     Current Discharge Medication List      CONTINUE these medications which have NOT CHANGED    Details   senna-docusate (Senokot-S) 8.6-50 mg per tablet Take 2 Tabs by mouth two (2) times a day. DULoxetine (CYMBALTA) 60 mg capsule Take 60 mg by mouth daily. amLODIPine (NORVASC) 2.5 mg tablet Take 2.5 mg by mouth nightly. naproxen (NAPROSYN) 375 mg tablet Take 375 mg by mouth as needed. cholecalciferol (VITAMIN D3) 1,000 unit cap Take  by mouth daily. amiodarone (CORDARONE) 200 mg tablet Take 1 Tab by mouth daily. Qty: 30 Tab, Refills: 6      indapamide (LOZOL) 2.5 mg tablet TAKE 1 TABLET BY MOUTH EVERY DAY IN THE MORNING  Qty: 30 Tab, Refills: 5      pravastatin (PRAVACHOL) 40 mg tablet Take 1 Tab by mouth nightly. Qty: 30 Tab, Refills: 3      KLOR-CON M20 20 mEq tablet TAKE 1 TABLET BY MOUTH EVERY DAY  Qty: 90 Tab, Refills: 2      omega-3 fatty acids-vitamin e (FISH OIL) 1,000 mg cap Take 1 Cap by mouth. XARELTO 20 mg tab tablet TAKE 1 TAB BY MOUTH DAILY (WITH DINNER). Qty: 30 Tab, Refills: 11             Procedures :  Left heart catheterization, coronary angiogram    Consults: none      PHYSICAL EXAM   Visit Vitals  BP (!) 156/88 (BP 1 Location: Right arm, BP Patient Position: At rest)   Pulse 65   Temp 97.5 °F (36.4 °C)   Resp 21   Ht 5' 9\" (1.753 m)   Wt 112.1 kg (247 lb 2 oz)   SpO2 97%   BMI 36.49 kg/m²     General: Awake, cooperative, no acute distress    HEENT: NC, Atraumatic. PERRLA, EOMI. Anicteric sclerae. Lungs:  CTA Bilaterally. No Wheezing/Rhonchi/Rales. Heart:  Regular  rhythm,  No murmur, No Rubs, No Gallops  Abdomen: Soft, Non distended, Non tender. +Bowel sounds,   Extremities: No c/c/e  Psych:   Not anxious or agitated. Neurologic:  No acute neurological deficits. Admission HPI :  See H&P    Hospital Course :  37-year-old gentleman came for outpatient cardiac catheterization. Left heart catheterization and coronary angiogram done via left radial approach. Cardiac catheterization revealed luminal irregularities in coronaries. Patient tolerated procedure. No intervention needed. Activity:   No driving for 24 hours, no weight lifting no more than 10 lb from left hand for 1 week    Diet:  cardiac    Follow-up: In cardiology clinic in 4 weeks    Disposition:  home    Minutes spent on discharge:  34 minutes      Labs: Results:       Chemistry Recent Labs     01/27/21  0845   *      K 3.5      CO2 29   BUN 18   CREA 1.15   CA 9.3   AGAP 6   BUCR 16   AP 70   TP 7.0   ALB 4.0   GLOB 3.0   AGRAT 1.3      CBC w/Diff No results for input(s): WBC, RBC, HGB, HCT, PLT, GRANS, LYMPH, EOS, HGBEXT, HCTEXT, PLTEXT in the last 72 hours. Cardiac Enzymes No results for input(s): CPK, CKND1, JAJA in the last 72 hours. No lab exists for component: CKRMB, TROIP   Coagulation No results for input(s): PTP, INR, APTT, INREXT in the last 72 hours. Lipid Panel Lab Results   Component Value Date/Time    Cholesterol, total 278 (H) 01/05/2015 10:01 AM    HDL Cholesterol 48 01/05/2015 10:01 AM    LDL, calculated 184 (H) 01/05/2015 10:01 AM    VLDL, calculated 46 (H) 01/05/2015 10:01 AM    Triglyceride 230 (H) 01/05/2015 10:01 AM      BNP No results for input(s): BNPP in the last 72 hours.    Liver Enzymes Recent Labs     01/27/21  0845   TP 7.0   ALB 4.0   AP 70      Thyroid Studies Lab Results   Component Value Date/Time    TSH 3.03 11/18/2020 11:50 AM Significant Diagnostic Studies: No results found.           CC: Sergey Lenz MD

## 2021-01-27 NOTE — DISCHARGE INSTRUCTIONS
Cardiac Catheterization/Angiography Discharge Instructions    *Check the puncture site frequently for swelling or bleeding. If you see any bleeding, lie down and apply pressure over the area with a clean town or washcloth. Notify your doctor for any redness, swelling, drainage or oozing from the puncture site. Notify your doctor for any fever or chills. *If the leg or arm with the puncture becomes cold, numb or painful, call Dr Dameon Arauz    *Activity should be limited for the next 48 hours. Climb stairs as little as possible and avoid any stooping, bending or strenuous activity for 48 hours. No heavy lifting (anything over 10 pounds) for three days. *Do not drive for 48 hours. *You may resume your usual diet. Drink more fluids than usual.    *Have a responsible person drive you home and stay with you for at least 24 hours after your heart catheterization/angiography. *You may remove the bandage from your Left Arm in 24 hours. You may shower in 24 hours. No tub baths, hot tubs or swimming for one week. Do not place any lotions, creams, powders, ointments over the puncture site for one week. You may place a clean band-aid over the puncture site each day for 5 days. Change this daily. DISCHARGE SUMMARY from Nurse    PATIENT INSTRUCTIONS:    After general anesthesia or intravenous sedation, for 24 hours or while taking prescription Narcotics:  · Limit your activities  · Do not drive and operate hazardous machinery  · Do not make important personal or business decisions  · Do  not drink alcoholic beverages  · If you have not urinated within 8 hours after discharge, please contact your surgeon on call.     Report the following to your surgeon:  · Excessive pain, swelling, redness or odor of or around the surgical area  · Temperature over 100.5  · Nausea and vomiting lasting longer than 4 hours or if unable to take medications  · Any signs of decreased circulation or nerve impairment to extremity: change in color, persistent  numbness, tingling, coldness or increase pain  · Any questions    What to do at Home:  Recommended activity: Activity as tolerated and no driving for today and No lifting, Driving, or Strenuous exercise for 48 hours. *  Please give a list of your current medications to your Primary Care Provider. *  Please update this list whenever your medications are discontinued, doses are      changed, or new medications (including over-the-counter products) are added. *  Please carry medication information at all times in case of emergency situations. These are general instructions for a healthy lifestyle:    No smoking/ No tobacco products/ Avoid exposure to second hand smoke  Surgeon General's Warning:  Quitting smoking now greatly reduces serious risk to your health. Obesity, smoking, and sedentary lifestyle greatly increases your risk for illness    A healthy diet, regular physical exercise & weight monitoring are important for maintaining a healthy lifestyle    You may be retaining fluid if you have a history of heart failure or if you experience any of the following symptoms:  Weight gain of 3 pounds or more overnight or 5 pounds in a week, increased swelling in our hands or feet or shortness of breath while lying flat in bed. Please call your doctor as soon as you notice any of these symptoms; do not wait until your next office visit. The discharge information has been reviewed with the patient and spouse. The patient and spouse verbalized understanding. Discharge medications reviewed with the patient and spouse and appropriate educational materials and side effects teaching were provided.       Patient armband removed and shredded  ___________________________________________________________________________________________________________________________________

## 2021-01-27 NOTE — ROUTINE PROCESS
Cardiac Cath Lab:  Pre Procedure Chart Check Patients chart was accessed and reviewed for possible and/or scheduled procedure. Creatinine Clearance: 
Serum creatinine: 1.15 mg/dL 01/27/21 0845 Estimated creatinine clearance: 70.6 mL/min Total Contrast  Load: 
3 x estimated clearance amount=  211.8ml 
 
75% of Contrast Load: 0.75 x Total Contrast Load=    158.8ml Recent Labs  
  01/27/21 
0845   
K 3.5 BUN 18 CREA 1.15 GFRAA >60  
GFRNA >60  
CA 9.3 BMI: Body mass index is 36.49 kg/m². ALLERGIES:  
Allergies Allergen Reactions  Statins-Hmg-Coa Reductase Inhibitors Other (comments) Body aches and tiredness  Tylenol [Acetaminophen] Other (comments) Body aches and tiredness Lines: 
  
  
Peripheral IV 01/27/21 Right Arm (Active) Site Assessment Clean, dry, & intact 01/27/21 6763 Phlebitis Assessment 0 01/27/21 5106 Infiltration Assessment 0 01/27/21 5659 Dressing Status Clean, dry, & intact 01/27/21 8354 Dressing Type Tape;Transparent 01/27/21 1273 Hub Color/Line Status Flushed; Infusing;Pink 01/27/21 8203 Action Taken Open ports on tubing capped 01/27/21 2210 Alcohol Cap Used Yes 01/27/21 2698 History: 
 
Past Medical History:  
Diagnosis Date  Acid reflux  Arthritis  Atrial fibrillation ([address] Paroxysmal  
 Cancer ([address] skin  Chest pain 2/9/2011  DVT of lower extremity (deep venous thrombosis) ([address] Right, after horse stepped on me  Hyperlipidemia  Hypopotassemia 6/6/2013  Long term (current) use of anticoagulants  LVH (left ventricular hypertrophy)  Near syncope 3/30/2014  Other and unspecified hyperlipidemia 1/28/2014  Sleep apnea   
 advised to bring CPAP day of surgery  SOB (shortness of breath) 2/10/2011 Past Surgical History:  
Procedure Laterality Date  HX CATARACT REMOVAL Bilateral 4-28-15  HX HERNIA REPAIR  Inguinal and umbilical  
  HX KNEE ARTHROSCOPY Right  HX TONSILLECTOMY Patient Active Problem List  
Diagnosis Code  Chest pain R07.9  Atrial fibrillation (HCC) I48.91  
 SOB (shortness of breath) R06.02  
 Long term (current) use of anticoagulants Z79.01  
 LVH (left ventricular hypertrophy) I51.7  Cerebrovascular disease I67.9  
 HTN (hypertension) I10  
 Bipolar disorder (HCC) F31.9  Hypopotassemia E87.6  Other and unspecified hyperlipidemia E78.5  Near syncope R55  Diverticulosis of colon K57.30

## 2021-02-09 ENCOUNTER — HOSPITAL ENCOUNTER (OUTPATIENT)
Dept: PREADMISSION TESTING | Age: 76
Discharge: HOME | End: 2021-02-09
Payer: MEDICARE

## 2021-02-09 PROCEDURE — U0003 INFECTIOUS AGENT DETECTION BY NUCLEIC ACID (DNA OR RNA); SEVERE ACUTE RESPIRATORY SYNDROME CORONAVIRUS 2 (SARS-COV-2) (CORONAVIRUS DISEASE [COVID-19]), AMPLIFIED PROBE TECHNIQUE, MAKING USE OF HIGH THROUGHPUT TECHNOLOGIES AS DESCRIBED BY CMS-2020-01-R: HCPCS

## 2021-02-09 NOTE — NURSE NAVIGATOR
Pre-operative total joint educational booklet and online seminar link sent to patient via e-mail provided.

## 2021-02-10 LAB — SARS-COV-2, COV2NT: NOT DETECTED

## 2021-02-15 PROBLEM — M16.11 OSTEOARTHRITIS OF RIGHT HIP: Chronic | Status: ACTIVE | Noted: 2021-02-15

## 2021-02-15 NOTE — H&P
[address] [facility] [address]  History and Physical Exam    Patient: Paulie Reis MRN: [id number]  SSN: [id number]    YOB: 1945  Age: 70 y.o. Sex: male      Subjective:      Chief Complaint: right hip pain    History of Present Illness:  Patient complains of right hip pain and difficulty ambulating, which has progressed over the past several months. X-rays showed osteoarthritis of the joint. The patient's pain has persisted and progressed despite conservative treatments and therapies. The patient has been previously treated with medications/injections. The patient has at this time opted for surgical intervention.        Past Medical History:   Diagnosis Date    Acid reflux     Adverse effect of anesthesia     hangover from anes    Arthritis     Atrial fibrillation (HCC)     Paroxysmal    Cancer (HCC)     skin    Chest pain 2011    DVT of lower extremity (deep venous thrombosis) (HCC)     Right, after horse stepped on me    Hyperlipidemia     Hypertension     Hypopotassemia 2013    IBS (irritable bowel syndrome)     constipation    Long term (current) use of anticoagulants     LVH (left ventricular hypertrophy)     Near syncope 3/30/2014    Osteoarthritis of right hip 2/15/2021    Other and unspecified hyperlipidemia 2014    Psychiatric disorder     anxiety    Sleep apnea     advised to bring CPAP day of surgery    SOB (shortness of breath) 2/10/2011     Past Surgical History:   Procedure Laterality Date    HX CATARACT REMOVAL Bilateral 4-28-15    HX HERNIA REPAIR      Inguinal and umbilical    HX KNEE ARTHROSCOPY Right     HX TONSILLECTOMY       Social History     Occupational History    Not on file   Tobacco Use    Smoking status: Former Smoker     Packs/day: 0.50     Years: 20.00     Pack years: 10.00     Types: Cigarettes     Quit date: 2/10/2005     Years since quittin.0    Smokeless tobacco: Never Used   Substance and Sexual Activity    Alcohol use: Yes     Comment: twice /week    Drug use: No    Sexual activity: Never     Prior to Admission medications    Medication Sig Start Date End Date Taking? Authorizing Provider   linaCLOtide (Linzess) 145 mcg cap capsule Take  by mouth Daily (before breakfast). Indications: chronic idiopathic constipation    Provider, Historical   DULoxetine (CYMBALTA) 60 mg capsule Take 60 mg by mouth nightly. Indications: repeated episodes of anxiety    Provider, Historical   amLODIPine (NORVASC) 2.5 mg tablet Take 2.5 mg by mouth nightly. Provider, Historical   naproxen (NAPROSYN) 375 mg tablet Take 375 mg by mouth as needed. Provider, Historical   cholecalciferol (VITAMIN D3) 1,000 unit cap Take  by mouth daily. Other, MD Mack   amiodarone (CORDARONE) 200 mg tablet Take 1 Tab by mouth daily. Patient taking differently: Take 200 mg by mouth nightly. 4/16/15   Natalie Lam MD   indapamide (LOZOL) 2.5 mg tablet TAKE 1 TABLET BY MOUTH EVERY DAY IN THE MORNING  Patient taking differently: Take 2.5 mg by mouth nightly. 3/21/15   Natalie Lam MD   XARELTO 20 mg tab tablet TAKE 1 TAB BY MOUTH DAILY (WITH DINNER). 2/24/15   Natalie Lam MD   pravastatin (PRAVACHOL) 40 mg tablet Take 1 Tab by mouth nightly. 2/10/15   Natalie Lam MD   KLOR-CON M20 20 mEq tablet TAKE 1 TABLET BY MOUTH EVERY DAY  Patient taking differently: Take 40 mEq by mouth nightly. 6/13/14   Natalie Lam MD   omega-3 fatty acids-vitamin e (FISH OIL) 1,000 mg cap Take 1 Cap by mouth. Provider, Historical       Allergies: Allergies   Allergen Reactions    Statins-Hmg-Coa Reductase Inhibitors Other (comments)     Body aches and tiredness    Tylenol [Acetaminophen] Other (comments)     Body aches and tiredness        Review of Systems:  Pertinent items are noted in the History of Present Illness.     Objective:       Physical Exam:  HEENT: Normocephalic, atraumatic  Lungs:  Clear to auscultation  Heart:   Regular rate and rhythm  Abdomen: Soft  Extremities:  Pain with range of motion of the right hip. Passive flexion  degrees,                       passive internal rotation 0-10 degrees, with pain throughout ROM,                        passive external rotation 10-20 degrees with pain at the arc of motion. Antalgic gait noted. Assessment:      Arthritis of the right hip. Plan:       Proceed with scheduled RIGHT TOTAL HIP ARTHROPLASTY. The various methods of treatment have been discussed with the patient and family. After consideration of risks, benefits, and other options for treatment, the patient has consented to surgical interventions. Questions were answered and preoperative teaching was done by Dr Bethanie Baumgarten.      Signed By: Yennifer Carrillo, [address]     February 15, 2021

## 2021-02-16 ENCOUNTER — HOSPITAL ENCOUNTER (OUTPATIENT)
Age: 76
Discharge: HOME HEALTH | End: 2021-02-16
Attending: ORTHOPAEDIC SURGERY | Admitting: ORTHOPAEDIC SURGERY
Payer: MEDICARE

## 2021-02-16 ENCOUNTER — HOME HEALTH ADMISSION (OUTPATIENT)
Dept: HOME HEALTH SERVICES | Facility: HOME HEALTH | Age: 76
End: 2021-02-16
Payer: MEDICARE

## 2021-02-16 ENCOUNTER — APPOINTMENT (OUTPATIENT)
Dept: GENERAL RADIOLOGY | Age: 76
End: 2021-02-16
Attending: ORTHOPAEDIC SURGERY
Payer: MEDICARE

## 2021-02-16 ENCOUNTER — APPOINTMENT (OUTPATIENT)
Dept: GENERAL RADIOLOGY | Age: 76
End: 2021-02-16
Attending: PHYSICIAN ASSISTANT
Payer: MEDICARE

## 2021-02-16 ENCOUNTER — ANESTHESIA (OUTPATIENT)
Dept: SURGERY | Age: 76
End: 2021-02-16
Payer: MEDICARE

## 2021-02-16 ENCOUNTER — ANESTHESIA EVENT (OUTPATIENT)
Dept: SURGERY | Age: 76
End: 2021-02-16
Payer: MEDICARE

## 2021-02-16 VITALS
WEIGHT: 247 LBS | HEART RATE: 78 BPM | RESPIRATION RATE: 14 BRPM | HEIGHT: 70 IN | TEMPERATURE: 98.2 F | DIASTOLIC BLOOD PRESSURE: 70 MMHG | SYSTOLIC BLOOD PRESSURE: 123 MMHG | OXYGEN SATURATION: 96 % | BODY MASS INDEX: 35.36 KG/M2

## 2021-02-16 DIAGNOSIS — M16.11 PRIMARY OSTEOARTHRITIS OF RIGHT HIP: Primary | Chronic | ICD-10-CM

## 2021-02-16 LAB
ABO + RH BLD: NORMAL
BLOOD GROUP ANTIBODIES SERPL: NORMAL
SPECIMEN EXP DATE BLD: NORMAL

## 2021-02-16 PROCEDURE — 74011250637 HC RX REV CODE- 250/637: Performed by: PHYSICIAN ASSISTANT

## 2021-02-16 PROCEDURE — 74011000250 HC RX REV CODE- 250: Performed by: NURSE ANESTHETIST, CERTIFIED REGISTERED

## 2021-02-16 PROCEDURE — 77030038010: Performed by: ORTHOPAEDIC SURGERY

## 2021-02-16 PROCEDURE — 74011250636 HC RX REV CODE- 250/636: Performed by: ANESTHESIOLOGY

## 2021-02-16 PROCEDURE — 77030031139 HC SUT VCRL2 J&J -A: Performed by: ORTHOPAEDIC SURGERY

## 2021-02-16 PROCEDURE — 76010000149 HC OR TIME 1 TO 1.5 HR: Performed by: ORTHOPAEDIC SURGERY

## 2021-02-16 PROCEDURE — 86901 BLOOD TYPING SEROLOGIC RH(D): CPT

## 2021-02-16 PROCEDURE — 77030003666 HC NDL SPINAL BD -A: Performed by: ORTHOPAEDIC SURGERY

## 2021-02-16 PROCEDURE — 77030020782 HC GWN BAIR PAWS FLX 3M -B: Performed by: ORTHOPAEDIC SURGERY

## 2021-02-16 PROCEDURE — 74011250636 HC RX REV CODE- 250/636: Performed by: NURSE ANESTHETIST, CERTIFIED REGISTERED

## 2021-02-16 PROCEDURE — 77030034694 HC SCPL CANADY PLSM DISP USMD -E: Performed by: ORTHOPAEDIC SURGERY

## 2021-02-16 PROCEDURE — 77030022295 HC SEAL BPLR VSL DISP MEDT -F: Performed by: ORTHOPAEDIC SURGERY

## 2021-02-16 PROCEDURE — 2709999900 HC NON-CHARGEABLE SUPPLY: Performed by: ORTHOPAEDIC SURGERY

## 2021-02-16 PROCEDURE — 73501 X-RAY EXAM HIP UNI 1 VIEW: CPT

## 2021-02-16 PROCEDURE — 76210000006 HC OR PH I REC 0.5 TO 1 HR: Performed by: ORTHOPAEDIC SURGERY

## 2021-02-16 PROCEDURE — 77030012508 HC MSK AIRWY LMA AMBU -A: Performed by: ANESTHESIOLOGY

## 2021-02-16 PROCEDURE — 36415 COLL VENOUS BLD VENIPUNCTURE: CPT

## 2021-02-16 PROCEDURE — 97165 OT EVAL LOW COMPLEX 30 MIN: CPT

## 2021-02-16 PROCEDURE — 77030037713 HC CLOSR DEV INCIS ZIP STRY -B: Performed by: ORTHOPAEDIC SURGERY

## 2021-02-16 PROCEDURE — 77030013708 HC HNDPC SUC IRR PULS STRY –B: Performed by: ORTHOPAEDIC SURGERY

## 2021-02-16 PROCEDURE — 77030040361 HC SLV COMPR DVT MDII -B: Performed by: ORTHOPAEDIC SURGERY

## 2021-02-16 PROCEDURE — 76060000033 HC ANESTHESIA 1 TO 1.5 HR: Performed by: ORTHOPAEDIC SURGERY

## 2021-02-16 PROCEDURE — 74011250637 HC RX REV CODE- 250/637: Performed by: ORTHOPAEDIC SURGERY

## 2021-02-16 PROCEDURE — 74011250636 HC RX REV CODE- 250/636: Performed by: PHYSICIAN ASSISTANT

## 2021-02-16 PROCEDURE — 97161 PT EVAL LOW COMPLEX 20 MIN: CPT

## 2021-02-16 PROCEDURE — 74011000250 HC RX REV CODE- 250: Performed by: ORTHOPAEDIC SURGERY

## 2021-02-16 PROCEDURE — 77030041075 HC DRSG AG OPTIFRM MDII -B: Performed by: ORTHOPAEDIC SURGERY

## 2021-02-16 PROCEDURE — 77030027138 HC INCENT SPIROMETER -A: Performed by: ORTHOPAEDIC SURGERY

## 2021-02-16 PROCEDURE — C1776 JOINT DEVICE (IMPLANTABLE): HCPCS | Performed by: ORTHOPAEDIC SURGERY

## 2021-02-16 PROCEDURE — 74011250637 HC RX REV CODE- 250/637: Performed by: ANESTHESIOLOGY

## 2021-02-16 PROCEDURE — 97535 SELF CARE MNGMENT TRAINING: CPT

## 2021-02-16 PROCEDURE — 74011250636 HC RX REV CODE- 250/636: Performed by: ORTHOPAEDIC SURGERY

## 2021-02-16 PROCEDURE — 74011000258 HC RX REV CODE- 258: Performed by: ORTHOPAEDIC SURGERY

## 2021-02-16 DEVICE — THREE HOLE, 54 MM
Type: IMPLANTABLE DEVICE | Site: HIP | Status: FUNCTIONAL
Brand: LEGEND ACETABULAR SHELL

## 2021-02-16 DEVICE — FEMORAL HEAD 36-12/14+0
Type: IMPLANTABLE DEVICE | Site: HIP | Status: FUNCTIONAL
Brand: DELTA CERAMIC FEMORAL HEAD

## 2021-02-16 DEVICE — ACETABULAR LINER NEUTRAL 36/54
Type: IMPLANTABLE DEVICE | Site: HIP | Status: FUNCTIONAL
Brand: LEGEND

## 2021-02-16 DEVICE — SIZE 12 STD COLLAR
Type: IMPLANTABLE DEVICE | Site: HIP | Status: FUNCTIONAL
Brand: ENTRADA HIP STEM

## 2021-02-16 DEVICE — HIP H2 TOT ADV OTHER HD IMPL CAPPED H2 OD: Type: IMPLANTABLE DEVICE | Site: HIP | Status: FUNCTIONAL

## 2021-02-16 RX ORDER — PANTOPRAZOLE SODIUM 40 MG/1
40 TABLET, DELAYED RELEASE ORAL DAILY
Status: DISCONTINUED | OUTPATIENT
Start: 2021-02-16 | End: 2021-02-16

## 2021-02-16 RX ORDER — OXYCODONE HYDROCHLORIDE 5 MG/1
5 TABLET ORAL
Qty: 42 TAB | Refills: 0 | Status: SHIPPED | OUTPATIENT
Start: 2021-02-16 | End: 2021-02-23

## 2021-02-16 RX ORDER — TRANEXAMIC ACID 650 1/1
1950 TABLET ORAL ONCE
Status: COMPLETED | OUTPATIENT
Start: 2021-02-16 | End: 2021-02-16

## 2021-02-16 RX ORDER — SODIUM CHLORIDE 0.9 % (FLUSH) 0.9 %
5-40 SYRINGE (ML) INJECTION AS NEEDED
Status: DISCONTINUED | OUTPATIENT
Start: 2021-02-16 | End: 2021-02-16 | Stop reason: HOSPADM

## 2021-02-16 RX ORDER — NALOXONE HYDROCHLORIDE 0.4 MG/ML
0.1 INJECTION, SOLUTION INTRAMUSCULAR; INTRAVENOUS; SUBCUTANEOUS AS NEEDED
Status: DISCONTINUED | OUTPATIENT
Start: 2021-02-16 | End: 2021-02-16 | Stop reason: HOSPADM

## 2021-02-16 RX ORDER — LIDOCAINE HYDROCHLORIDE 20 MG/ML
INJECTION, SOLUTION EPIDURAL; INFILTRATION; INTRACAUDAL; PERINEURAL AS NEEDED
Status: DISCONTINUED | OUTPATIENT
Start: 2021-02-16 | End: 2021-02-16 | Stop reason: HOSPADM

## 2021-02-16 RX ORDER — SODIUM CHLORIDE 9 MG/ML
300 INJECTION, SOLUTION INTRAVENOUS CONTINUOUS
Status: DISCONTINUED | OUTPATIENT
Start: 2021-02-16 | End: 2021-02-16 | Stop reason: HOSPADM

## 2021-02-16 RX ORDER — HYDROMORPHONE HYDROCHLORIDE 2 MG/ML
INJECTION, SOLUTION INTRAMUSCULAR; INTRAVENOUS; SUBCUTANEOUS AS NEEDED
Status: DISCONTINUED | OUTPATIENT
Start: 2021-02-16 | End: 2021-02-16 | Stop reason: HOSPADM

## 2021-02-16 RX ORDER — ZOLPIDEM TARTRATE 5 MG/1
5-10 TABLET ORAL
Status: DISCONTINUED | OUTPATIENT
Start: 2021-02-16 | End: 2021-02-16 | Stop reason: HOSPADM

## 2021-02-16 RX ORDER — SODIUM CHLORIDE, SODIUM LACTATE, POTASSIUM CHLORIDE, CALCIUM CHLORIDE 600; 310; 30; 20 MG/100ML; MG/100ML; MG/100ML; MG/100ML
100 INJECTION, SOLUTION INTRAVENOUS CONTINUOUS
Status: DISCONTINUED | OUTPATIENT
Start: 2021-02-16 | End: 2021-02-16 | Stop reason: HOSPADM

## 2021-02-16 RX ORDER — DOCUSATE SODIUM 100 MG/1
100 CAPSULE, LIQUID FILLED ORAL DAILY
Status: DISCONTINUED | OUTPATIENT
Start: 2021-02-17 | End: 2021-02-16 | Stop reason: HOSPADM

## 2021-02-16 RX ORDER — CEFADROXIL 500 MG/1
500 CAPSULE ORAL 2 TIMES DAILY
Qty: 10 CAP | Refills: 0 | Status: SHIPPED | OUTPATIENT
Start: 2021-02-16 | End: 2021-02-21

## 2021-02-16 RX ORDER — PANTOPRAZOLE SODIUM 40 MG/1
40 TABLET, DELAYED RELEASE ORAL DAILY
Status: DISCONTINUED | OUTPATIENT
Start: 2021-02-16 | End: 2021-02-16 | Stop reason: HOSPADM

## 2021-02-16 RX ORDER — DEXAMETHASONE SODIUM PHOSPHATE 4 MG/ML
8 INJECTION, SOLUTION INTRA-ARTICULAR; INTRALESIONAL; INTRAMUSCULAR; INTRAVENOUS; SOFT TISSUE ONCE
Status: COMPLETED | OUTPATIENT
Start: 2021-02-16 | End: 2021-02-16

## 2021-02-16 RX ORDER — ONDANSETRON 2 MG/ML
4 INJECTION INTRAMUSCULAR; INTRAVENOUS
Status: DISCONTINUED | OUTPATIENT
Start: 2021-02-16 | End: 2021-02-16 | Stop reason: HOSPADM

## 2021-02-16 RX ORDER — SODIUM CHLORIDE 9 MG/ML
125 INJECTION, SOLUTION INTRAVENOUS CONTINUOUS
Status: DISCONTINUED | OUTPATIENT
Start: 2021-02-16 | End: 2021-02-16 | Stop reason: HOSPADM

## 2021-02-16 RX ORDER — SODIUM CHLORIDE 0.9 % (FLUSH) 0.9 %
5-40 SYRINGE (ML) INJECTION EVERY 8 HOURS
Status: DISCONTINUED | OUTPATIENT
Start: 2021-02-16 | End: 2021-02-16 | Stop reason: HOSPADM

## 2021-02-16 RX ORDER — MIDAZOLAM HYDROCHLORIDE 1 MG/ML
INJECTION, SOLUTION INTRAMUSCULAR; INTRAVENOUS AS NEEDED
Status: DISCONTINUED | OUTPATIENT
Start: 2021-02-16 | End: 2021-02-16 | Stop reason: HOSPADM

## 2021-02-16 RX ORDER — PROPOFOL 10 MG/ML
INJECTION, EMULSION INTRAVENOUS AS NEEDED
Status: DISCONTINUED | OUTPATIENT
Start: 2021-02-16 | End: 2021-02-16 | Stop reason: HOSPADM

## 2021-02-16 RX ORDER — KETAMINE HYDROCHLORIDE 10 MG/ML
INJECTION, SOLUTION INTRAMUSCULAR; INTRAVENOUS AS NEEDED
Status: DISCONTINUED | OUTPATIENT
Start: 2021-02-16 | End: 2021-02-16 | Stop reason: HOSPADM

## 2021-02-16 RX ORDER — ONDANSETRON 2 MG/ML
INJECTION INTRAMUSCULAR; INTRAVENOUS AS NEEDED
Status: DISCONTINUED | OUTPATIENT
Start: 2021-02-16 | End: 2021-02-16 | Stop reason: HOSPADM

## 2021-02-16 RX ORDER — HYDROMORPHONE HYDROCHLORIDE 1 MG/ML
0.5 INJECTION, SOLUTION INTRAMUSCULAR; INTRAVENOUS; SUBCUTANEOUS
Status: DISCONTINUED | OUTPATIENT
Start: 2021-02-16 | End: 2021-02-16 | Stop reason: HOSPADM

## 2021-02-16 RX ORDER — CEFAZOLIN SODIUM/WATER 2 G/20 ML
2 SYRINGE (ML) INTRAVENOUS ONCE
Status: COMPLETED | OUTPATIENT
Start: 2021-02-16 | End: 2021-02-16

## 2021-02-16 RX ORDER — CELECOXIB 100 MG/1
200 CAPSULE ORAL ONCE
Status: COMPLETED | OUTPATIENT
Start: 2021-02-16 | End: 2021-02-16

## 2021-02-16 RX ORDER — CEFAZOLIN SODIUM/WATER 2 G/20 ML
2 SYRINGE (ML) INTRAVENOUS EVERY 8 HOURS
Status: DISCONTINUED | OUTPATIENT
Start: 2021-02-16 | End: 2021-02-16 | Stop reason: HOSPADM

## 2021-02-16 RX ORDER — ALBUTEROL SULFATE 0.83 MG/ML
2.5 SOLUTION RESPIRATORY (INHALATION)
Status: DISCONTINUED | OUTPATIENT
Start: 2021-02-16 | End: 2021-02-16 | Stop reason: HOSPADM

## 2021-02-16 RX ORDER — DIPHENHYDRAMINE HYDROCHLORIDE 50 MG/ML
12.5 INJECTION, SOLUTION INTRAMUSCULAR; INTRAVENOUS
Status: DISCONTINUED | OUTPATIENT
Start: 2021-02-16 | End: 2021-02-16 | Stop reason: HOSPADM

## 2021-02-16 RX ORDER — LANOLIN ALCOHOL/MO/W.PET/CERES
1 CREAM (GRAM) TOPICAL 3 TIMES DAILY
Status: DISCONTINUED | OUTPATIENT
Start: 2021-02-16 | End: 2021-02-16 | Stop reason: HOSPADM

## 2021-02-16 RX ORDER — OXYCODONE HYDROCHLORIDE 5 MG/1
5-10 TABLET ORAL
Status: DISCONTINUED | OUTPATIENT
Start: 2021-02-16 | End: 2021-02-16 | Stop reason: HOSPADM

## 2021-02-16 RX ORDER — NALOXONE HYDROCHLORIDE 0.4 MG/ML
0.4 INJECTION, SOLUTION INTRAMUSCULAR; INTRAVENOUS; SUBCUTANEOUS AS NEEDED
Status: DISCONTINUED | OUTPATIENT
Start: 2021-02-16 | End: 2021-02-16 | Stop reason: HOSPADM

## 2021-02-16 RX ORDER — HYDROMORPHONE HYDROCHLORIDE 1 MG/ML
0.2 INJECTION, SOLUTION INTRAMUSCULAR; INTRAVENOUS; SUBCUTANEOUS AS NEEDED
Status: DISCONTINUED | OUTPATIENT
Start: 2021-02-16 | End: 2021-02-16 | Stop reason: HOSPADM

## 2021-02-16 RX ORDER — SODIUM CHLORIDE, SODIUM LACTATE, POTASSIUM CHLORIDE, CALCIUM CHLORIDE 600; 310; 30; 20 MG/100ML; MG/100ML; MG/100ML; MG/100ML
125 INJECTION, SOLUTION INTRAVENOUS CONTINUOUS
Status: DISCONTINUED | OUTPATIENT
Start: 2021-02-16 | End: 2021-02-16 | Stop reason: HOSPADM

## 2021-02-16 RX ORDER — GABAPENTIN 300 MG/1
300 CAPSULE ORAL ONCE
Status: COMPLETED | OUTPATIENT
Start: 2021-02-16 | End: 2021-02-16

## 2021-02-16 RX ADMIN — Medication 2 G: at 13:08

## 2021-02-16 RX ADMIN — TRANEXAMIC ACID 1950 MG: 650 TABLET ORAL at 11:29

## 2021-02-16 RX ADMIN — ONDANSETRON HYDROCHLORIDE 4 MG: 2 INJECTION INTRAMUSCULAR; INTRAVENOUS at 13:50

## 2021-02-16 RX ADMIN — PROPOFOL 200 MG: 10 INJECTION, EMULSION INTRAVENOUS at 13:01

## 2021-02-16 RX ADMIN — PANTOPRAZOLE SODIUM 40 MG: 40 TABLET, DELAYED RELEASE ORAL at 11:29

## 2021-02-16 RX ADMIN — HYDROMORPHONE HYDROCHLORIDE 1 MG: 2 INJECTION, SOLUTION INTRAMUSCULAR; INTRAVENOUS; SUBCUTANEOUS at 13:25

## 2021-02-16 RX ADMIN — HYDROMORPHONE HYDROCHLORIDE 1 MG: 2 INJECTION, SOLUTION INTRAMUSCULAR; INTRAVENOUS; SUBCUTANEOUS at 13:19

## 2021-02-16 RX ADMIN — LIDOCAINE HYDROCHLORIDE 100 MG: 20 INJECTION, SOLUTION EPIDURAL; INFILTRATION; INTRACAUDAL; PERINEURAL at 13:01

## 2021-02-16 RX ADMIN — HYDROMORPHONE HYDROCHLORIDE 0.5 MG: 1 INJECTION, SOLUTION INTRAMUSCULAR; INTRAVENOUS; SUBCUTANEOUS at 15:05

## 2021-02-16 RX ADMIN — DEXAMETHASONE SODIUM PHOSPHATE 8 MG: 4 INJECTION, SOLUTION INTRAMUSCULAR; INTRAVENOUS at 11:29

## 2021-02-16 RX ADMIN — CELECOXIB 200 MG: 100 CAPSULE ORAL at 11:29

## 2021-02-16 RX ADMIN — SODIUM CHLORIDE, SODIUM LACTATE, POTASSIUM CHLORIDE, AND CALCIUM CHLORIDE 1000 ML: 600; 310; 30; 20 INJECTION, SOLUTION INTRAVENOUS at 11:26

## 2021-02-16 RX ADMIN — KETAMINE HYDROCHLORIDE 50 MG: 10 INJECTION, SOLUTION INTRAMUSCULAR; INTRAVENOUS at 13:06

## 2021-02-16 RX ADMIN — SODIUM CHLORIDE 300 ML/HR: 900 INJECTION, SOLUTION INTRAVENOUS at 15:59

## 2021-02-16 RX ADMIN — SODIUM CHLORIDE, SODIUM LACTATE, POTASSIUM CHLORIDE, AND CALCIUM CHLORIDE 125 ML/HR: 600; 310; 30; 20 INJECTION, SOLUTION INTRAVENOUS at 14:41

## 2021-02-16 RX ADMIN — SODIUM CHLORIDE, SODIUM LACTATE, POTASSIUM CHLORIDE, AND CALCIUM CHLORIDE 125 ML/HR: 600; 310; 30; 20 INJECTION, SOLUTION INTRAVENOUS at 11:26

## 2021-02-16 RX ADMIN — GABAPENTIN 300 MG: 300 CAPSULE ORAL at 11:29

## 2021-02-16 RX ADMIN — HYDROMORPHONE HYDROCHLORIDE 0.5 MG: 1 INJECTION, SOLUTION INTRAMUSCULAR; INTRAVENOUS; SUBCUTANEOUS at 14:49

## 2021-02-16 RX ADMIN — MIDAZOLAM 2 MG: 1 INJECTION INTRAMUSCULAR; INTRAVENOUS at 12:56

## 2021-02-16 NOTE — PERIOP NOTES
1427: Attempted to update family. No answer on provided number.     1430: Family updated on patient current status and care progress and given room assignment #239

## 2021-02-16 NOTE — PROGRESS NOTES
1737  PT/OT in to see pt.    1810  Pt has passed PT and voided post op. Pt ready for d/c    1811  Dual AVS reviewed with TODD Rae rn. All medications reviewed individually with patient. Opportunities for questions and concerns provided. Patient to be discharged via (mode of transport ie. Car, ambulance or air transport) car. Patient's arm band appropriately discarded.     IV removed

## 2021-02-16 NOTE — PERIOP NOTES
Pt. Used restroom in pre-op area with assistance. Patient placed on Dale Paws for a minimum of 30 min in  Preop.

## 2021-02-16 NOTE — ANESTHESIA PREPROCEDURE EVALUATION
Relevant Problems   No relevant active problems       Anesthetic History   No history of anesthetic complications            Review of Systems / Medical History  Patient summary reviewed and pertinent labs reviewed    Pulmonary        Sleep apnea: CPAP      Pertinent negatives: No smoker     Neuro/Psych         Psychiatric history     Cardiovascular    Hypertension: well controlled        Dysrhythmias : atrial fibrillation           GI/Hepatic/Renal  Within defined limits           Pertinent negatives: No hiatal hernia and GERD   Endo/Other        Arthritis     Other Findings              Physical Exam    Airway  Mallampati: II  TM Distance: > 6 cm  Neck ROM: normal range of motion        Cardiovascular    Rhythm: regular  Rate: normal         Dental         Pulmonary  Breath sounds clear to auscultation               Abdominal  GI exam deferred       Other Findings            Anesthetic Plan    ASA: 3  Anesthesia type: general          Induction: Intravenous  Anesthetic plan and risks discussed with: Patient

## 2021-02-16 NOTE — PROGRESS NOTES
1811  Progression of Symptoms:    [] Pain 1/10   [] Improvement post medication administration   [] No improvement, provider notified   [] Nausea   [] Improvement post medication administration   [] No improvement, provider notified   [] Hypotension/Hypertension   [] Improved post medication administration   [] No improvement, provider notified     Readiness for Discharge:  [x] Vital signs stable  [x] + Voiding  [x] Wound intact, drainage minimal  [x] Tolerating PO intake  [x] Cleared by PT (OT if applicable)  [x] Discharge education completed with patient and family member to specifically include   [x] Recommended stool softener  [x] Proper elevation visual demonstration

## 2021-02-16 NOTE — PROGRESS NOTES
7990  Same Day Discharge     - Patient arrives to unit at this time. Dual skin assessment completed with TODD ocampo rn, no abnormalities noted other than surgical incision to right hip.  mepilex silver dressing CDI. Assumed care of pt at this time. Assessment complete. Pt alert and oriented x 4. Shows no sign of distress. Fall risk arm band in place. Denies SOB and chest pain. Pt lungs clear bilaterally. Cap refill  less than 3 seconds. Pt denies numbness and tingling to all extremities. Stated pain 0/10. Pt has 18 G IV to L forearm. Patient oriented to room to include use of call bell, meal ordering, and use of incentive spirometer, patient able to get IS to 2000. Patient instructed to order lunch and given explanation of home for dinner plan. Phone and call bell left within reach. Educated on pain medication availability and possible side effects, as well as need to call for assistance before getting out of bed. Patient verbalized understanding.      Symptoms present on arrival:  [] Pain 0/10   [] Medicated  [] Nausea   [] Medicated   [] Hypotension/Hypertension   [] Provider notified

## 2021-02-16 NOTE — INTERVAL H&P NOTE
Update History & Physical    The Patient's History and Physical of February 15,2021,     was reviewed with the patient and I examined the patient. There was no change. The surgical site was confirmed by the patient and me. Plan:  The risk, benefits, expected outcome, and alternative to the recommended procedure have been discussed with the patient. Patient understands and wants to proceed with the procedure.     Electronically signed by Minna Lugo MD on 2/16/2021 at 11:31 AM

## 2021-02-16 NOTE — ROUTINE PROCESS
1522  TRANSFER - IN REPORT:    Verbal report received from TOMASZ robert(name) on [place] [place]  being received from [employer]) for routine post - op      Report consisted of patients Situation, Background, Assessment and   Recommendations(SBAR). Information from the following report(s) SBAR, Kardex, [facility] and Recent Results was reviewed with the receiving nurse. Opportunity for questions and clarification was provided. Assessment completed upon patients arrival to unit and care assumed.

## 2021-02-16 NOTE — DISCHARGE SUMMARY
[address] [address]     DISCHARGE SUMMARY     PATIENT: Abdullahi Warner     MRN: [id number]   ADMIT DATE: 2021   BILLIN   DISCHARGE DATE: 2021     ATTENDING: Tiffani Aj MD   DICTATING: ACE Aucna     ADMISSION DIAGNOSIS: Osteoarthritis of right hip [M16.11]    DISCHARGE DIAGNOSIS: Status post RIGHT TOTAL HIP ARTHROPLASTY    HISTORY OF PRESENT ILLNESS: The patient is a 70y.o. year-old male   with ongoing right hip pain secondary to osteoarthritis of right hip. The patient's pain has persisted and progressed despite conservative treatments and therapies. The patient has at this time opted for surgical intervention.     PAST MEDICAL HISTORY:   Past Medical History:   Diagnosis Date    Acid reflux     Adverse effect of anesthesia     hangover from anes    Arthritis     Atrial fibrillation (HCC)     Paroxysmal    Cancer (HCC)     skin    Chest pain 2011    DVT of lower extremity (deep venous thrombosis) (HCC)     Right, after horse stepped on me    Hyperlipidemia     Hypertension     Hypopotassemia 2013    IBS (irritable bowel syndrome)     constipation    Long term (current) use of anticoagulants     LVH (left ventricular hypertrophy)     Near syncope 3/30/2014    Osteoarthritis of right hip 2/15/2021    Other and unspecified hyperlipidemia 2014    Psychiatric disorder     anxiety    Sleep apnea     advised to bring CPAP day of surgery    SOB (shortness of breath) 2/10/2011       PAST SURGICAL HISTORY:   Past Surgical History:   Procedure Laterality Date    HX CATARACT REMOVAL Bilateral 4-28-15    HX HERNIA REPAIR      Inguinal and umbilical    HX KNEE ARTHROSCOPY Right     HX TONSILLECTOMY         ALLERGIES:   Allergies   Allergen Reactions    Statins-Hmg-Coa Reductase Inhibitors Other (comments)     Body aches and tiredness    Tylenol [Acetaminophen] Other (comments)     Body aches and tiredness        CURRENT MEDICATIONS:  A list of medications prior to the time of admission include:  Prior to Admission medications    Medication Sig Start Date End Date Taking? Authorizing Provider   sennosides (SENNA PO) Take  by mouth daily as needed. Yes Provider, Historical   rivaroxaban (Xarelto) 10 mg tablet Take 1 Tab by mouth daily for 5 days. 2/16/21 2/21/21 Yes Etta Esparza PA   oxyCODONE IR (ROXICODONE) 5 mg immediate release tablet Take 1 Tab by mouth every four (4) hours as needed for Pain for up to 7 days. Max Daily Amount: 30 mg. 2/16/21 2/23/21 Yes Etta Esparza PA   cefadroxil (DURICEF) 500 mg capsule Take 1 Cap by mouth two (2) times a day for 5 days. 2/16/21 2/21/21 Yes Etta Esparza PA   linaCLOtide (Linzess) 145 mcg cap capsule Take  by mouth Daily (before breakfast). Indications: chronic idiopathic constipation   Yes Provider, Historical   DULoxetine (CYMBALTA) 60 mg capsule Take 60 mg by mouth nightly. Indications: repeated episodes of anxiety   Yes Provider, Historical   amLODIPine (NORVASC) 2.5 mg tablet Take 2.5 mg by mouth nightly. Yes Provider, Historical   cholecalciferol (VITAMIN D3) 1,000 unit cap Take  by mouth daily. Yes Other, MD Mack   amiodarone (CORDARONE) 200 mg tablet Take 1 Tab by mouth daily. Patient taking differently: Take 200 mg by mouth nightly. 4/16/15  Yes Kalee Lam MD   indapamide (LOZOL) 2.5 mg tablet TAKE 1 TABLET BY MOUTH EVERY DAY IN THE MORNING  Patient taking differently: Take 2.5 mg by mouth nightly. 3/21/15  Yes Kalee Lam MD   pravastatin (PRAVACHOL) 40 mg tablet Take 1 Tab by mouth nightly. 2/10/15  Yes Kalee Lam MD   KLOR-CON M20 20 mEq tablet TAKE 1 TABLET BY MOUTH EVERY DAY  Patient taking differently: Take 40 mEq by mouth nightly.  6/13/14  Yes Kalee Lam MD       FAMILY HISTORY:   Family History   Problem Relation Age of Onset    Hypertension Father        SOCIAL HISTORY:   Social History     Socioeconomic History    Marital status: SINGLE     Spouse name: Not on file    Number of children: Not on file    Years of education: Not on file    Highest education level: Not on file   Tobacco Use    Smoking status: Former Smoker     Packs/day: 0.50     Years: 20.00     Pack years: 10.00     Types: Cigarettes     Quit date: 2/10/2005     Years since quittin.0    Smokeless tobacco: Never Used   Substance and Sexual Activity    Alcohol use: Yes     Comment: twice /week    Drug use: No    Sexual activity: Never       REVIEW OF SYSTEMS: All review of systems are negative. PHYSICAL EXAMINATION: For a detailed physical exam, please refer to the patient's chart. HOSPITAL COURSE: The patient was taken to surgery the day of admission. he underwent right total hip replacement via the anterior approach. Operative course was benign. Estimated blood loss approximately 300 cc. The patient was taken to the PACU in stable condition and was later taken to the floor in stable condition. Post-op Day #0, patient has done very well.  he has had little to no pain. he had been cleared by physical therapy with stair training. he was placed on Xarelto for DVT prophylaxis. his vitals have remained stable. he has also remained hemodynamically stable. The patient has been recommended for discharge home. DISCHARGE INSTRUCTIONS: The patient is to be discharged home. Discharge Medication List as of 2021  5:18 PM      START taking these medications    Details   oxyCODONE IR (ROXICODONE) 5 mg immediate release tablet Take 1 Tab by mouth every four (4) hours as needed for Pain for up to 7 days. Max Daily Amount: 30 mg., Normal, Disp-42 Tab, R-0      cefadroxil (DURICEF) 500 mg capsule Take 1 Cap by mouth two (2) times a day for 5 days. , Normal, Disp-10 Cap, R-0         CONTINUE these medications which have CHANGED    Details   rivaroxaban (Xarelto) 10 mg tablet Take 1 Tab by mouth daily for 5 days., Normal, Disp-5 Tab, R-0         CONTINUE these medications which have NOT CHANGED    Details   sennosides (SENNA PO) Take  by mouth daily as needed., Historical Med      linaCLOtide (Linzess) 145 mcg cap capsule Take  by mouth Daily (before breakfast). Indications: chronic idiopathic constipation, Historical Med      DULoxetine (CYMBALTA) 60 mg capsule Take 60 mg by mouth nightly. Indications: repeated episodes of anxiety, Historical Med      amLODIPine (NORVASC) 2.5 mg tablet Take 2.5 mg by mouth nightly., Historical Med      cholecalciferol (VITAMIN D3) 1,000 unit cap Take  by mouth daily. , Historical Med      amiodarone (CORDARONE) 200 mg tablet Take 1 Tab by mouth daily. , Normal, Disp-30 Tab, R-6      indapamide (LOZOL) 2.5 mg tablet TAKE 1 TABLET BY MOUTH EVERY DAY IN THE MORNING, Normal, Disp-30 Tab, R-5      pravastatin (PRAVACHOL) 40 mg tablet Take 1 Tab by mouth nightly., Normal, Disp-30 Tab, R-3      KLOR-CON M20 20 mEq tablet TAKE 1 TABLET BY MOUTH EVERY DAY, Normal, Disp-90 Tab, R-2         STOP taking these medications       naproxen (NAPROSYN) 375 mg tablet Comments:   Reason for Stopping:         omega-3 fatty acids-vitamin e (FISH OIL) 1,000 mg cap Comments:   Reason for Stopping:                 The patient is to continue at home with home physical therapy 3 times a week to work on gait training, range of motion, strengthening, and weightbearing exercises as tolerated on his right lower extremity. The patient is to progress from a walker to a cane to complete total weightbearing as tolerable. The patient is to continue to keep his incision dry. The patient is to followup with Dr. Shanon Bowling, [facility], and/or [facility] in the office approximately 10-14 days status post for x-rays and further evaluation.       ACE Ross  2/17/2021

## 2021-02-16 NOTE — PERIOP NOTES
TRANSFER - OUT REPORT:    Verbal report given to [place] on Abdullahi Mar  being transferred to [facility] (unit) for routine progression of care       Report consisted of patients Situation, Background, Assessment and   Recommendations(SBAR). Information from the following report(s) SBAR, Kardex, Procedure Summary, Intake/Output and Cardiac Rhythm SR was reviewed with the receiving nurse. Lines:   Peripheral IV 02/16/21 Left Forearm (Active)   Site Assessment Clean, dry, & intact 02/16/21 1449   Phlebitis Assessment 0 02/16/21 1449   Infiltration Assessment 0 02/16/21 1449   Dressing Status Clean, dry, & intact 02/16/21 1449   Dressing Type Tape;Transparent 02/16/21 1449   Hub Color/Line Status Green; Infusing;Patent 02/16/21 1449        Opportunity for questions and clarification was provided.       Patient transported with:   O2 @ 2 liters  Registered Nurse

## 2021-02-16 NOTE — PERIOP NOTES
Contact Numbers    [facility] Main Line: [phone]  [facility] Triage:  [phone]    Call triage with chills and/or temperature greater than or equal to 100.5, uncontrolled nausea/vomiting, diarrhea, constipation, dizziness, shortness of breath, chest pain, bleeding, unexplained bruising, or any new/concerning symptoms, questions/concerns.     If you are having any concerning symptoms or wish to speak to a provider before your next infusion visit, please call your care coordinator or triage to notify them so we can adequately serve you.       After Hours: [phone]    If after hours, weekends, or holidays, call main hospital  and ask for Oncology doctor on call.           February 2018 Sunday Monday Tuesday Wednesday Thursday Friday Saturday                       1     2     3       4     5     6     7     8     9     10       11     12     13     [facility]   12:00 PM   (15 min.)    [facility] DRAW   [facility] Lab Draw     P RETURN   12:15 PM   (50 min.)   Day Ren PA-C   [facility] ONC INFUSION 120    1:30 PM   (120 min.)    ONCOLOGY INFUSION   [facility] 14     MR BRAIN WWO    3:45 PM   (60 min.)   UUMR1   [facility] [place], MRI 15     16     17       18     19     20     21     22     23     24       25     26     27     CT CHEST/ABDOMEN/PELVIS W   12:25 PM   (20 min.)   UCCT2   [facility] CT 28     [facility] LAB DRAW   12:00 PM   (15 min.)    [facility] LAB DRAW   [facility] Lab Draw     P RETURN   12:15 PM   (30 min.)   Henri Green MD   [facility]                           March 2018 Sunday Monday Tuesday Wednesday Thursday Friday Saturday                       1     2     3       4     5     6     7     8     9     10       11     12     13     14     15     16     17       18     19     20     21     22     23     24       25     26     27     28     29     30     31      Primary Nurse Slava Sher RN and [place] RN performed a dual skin assessment on this patient . Left pt in stable condition.    Visit Vitals  /69   Pulse 74   Temp 97.4 °F (36.3 °C)   Resp 16   Ht 5' 10\" (1.778 m)   Wt 112 kg (247 lb)   SpO2 96%   BMI 35.44 kg/m²              Lab Results:  Recent Results (from the past 12 hour(s))   Comprehensive metabolic panel    Collection Time: 02/13/18 12:47 PM   Result Value Ref Range    Sodium 136 133 - 144 mmol/L    Potassium 4.0 3.4 - 5.3 mmol/L    Chloride 101 94 - 109 mmol/L    Carbon Dioxide 27 20 - 32 mmol/L    Anion Gap 7 3 - 14 mmol/L    Glucose 124 (H) 70 - 99 mg/dL    Urea Nitrogen 19 7 - 30 mg/dL    Creatinine 1.05 (H) 0.52 - 1.04 mg/dL    GFR Estimate 55 (L) >60 mL/min/1.7m2    GFR Estimate If Black 66 >60 mL/min/1.7m2    Calcium 9.6 8.5 - 10.1 mg/dL    Bilirubin Total 0.3 0.2 - 1.3 mg/dL    Albumin 3.8 3.4 - 5.0 g/dL    Protein Total 8.0 6.8 - 8.8 g/dL    Alkaline Phosphatase 86 40 - 150 U/L    ALT 17 0 - 50 U/L    AST 24 0 - 45 U/L   CBC with platelets differential    Collection Time: 02/13/18 12:47 PM   Result Value Ref Range    WBC 5.1 4.0 - 11.0 10e9/L    RBC Count 4.15 3.8 - 5.2 10e12/L    Hemoglobin 12.4 11.7 - 15.7 g/dL    Hematocrit 37.9 35.0 - 47.0 %    MCV 91 78 - 100 fl    MCH 29.9 26.5 - 33.0 pg    MCHC 32.7 31.5 - 36.5 g/dL    RDW 13.2 10.0 - 15.0 %    Platelet Count 384 150 - 450 10e9/L    Diff Method Automated Method     % Neutrophils 53.1 %    % Lymphocytes 35.4 %    % Monocytes 6.5 %    % Eosinophils 4.0 %    % Basophils 0.6 %    % Immature Granulocytes 0.4 %    Nucleated RBCs 0 0 /100    Absolute Neutrophil 2.7 1.6 - 8.3 10e9/L    Absolute Lymphocytes 1.8 0.8 - 5.3 10e9/L    Absolute Monocytes 0.3 0.0 - 1.3 10e9/L    Absolute Eosinophils 0.2 0.0 - 0.7 10e9/L    Absolute Basophils 0.0 0.0 - 0.2 10e9/L    Abs Immature Granulocytes 0.0 0 - 0.4 10e9/L    Absolute Nucleated RBC 0.0

## 2021-02-16 NOTE — OP NOTES
[address] [facility] [address]  Total Hip Arthroplasty      Patient: Kenn Kumar MRN: [id number]  SSN: [id number]    YOB: 1945  Age: 70 y.o. Sex: male      Date of Surgery: 2/16/2021   Preoperative Diagnosis: UNILATERAL PRIMARY OSTEOARTHRITIS RIGHT HIP   Postoperative Diagnosis: UNILATERAL PRIMARY OSTEOARTHRITIS RIGHT HIP   Location: [facility]  Surgeon: Wilberto Owen MD  Assistant: Elizabeth Patel PA-C    Anesthesia: general    Procedure: Total Right Hip Arthroplasty    Findings: Degenerative joint disease of the right hip. Estimated Blood Loss: 300ml    Specimens: None    Complications: none    Implants:   Implant Name Type Inv. Item Serial No.  Lot No. LRB No. Used Action   LINER ACET OD54MM [id number] NEUT LEGEND - [id number]  LINER ACET OD54MM [id number] NEUT LEGEND  [employer] [id number] Right 1 Implanted   SHELL ACET OD54MM 3 H HIGHLY POR HMSPHR LEGEND - [id number]  SHELL ACET OD54MM 3 H HIGHLY POR HMSPHR LEGEND  [employer] [id number] Right 1 Implanted   HEAD FEM [id number] +0MM 12 14 TAPR CERAMIC BIOLOX DELT - [id number]  HEAD FEM [id number] +0MM 12 14 TAPR CERAMIC BIOLOX DELT  [employer] [id number] Right 1 Implanted   STEM FEM SZ 12 STD HIP CLLR MOD ENTRADA - [id number]  STEM FEM SZ 12 STD HIP CLLR MOD ENTRADA  [employer] [id number] Right 1 Implanted       Procedure Detail:  After the patient was brought to the operating suite, He was effectively anesthetized using general anesthesia, then transferred to the Shellman table and secured in a standard fashion. His right hip was then prepped and draped in a normal sterile orthopedic fashion. He was given appropriate intravenous antibiotics preoperatively. After a proper timeout was performed, a direct anterior approach to the hip was performed using a short Whittington-Gallo interval. Anterior capsulotomy was performed.  The degenerative changes of the hip were noted. Femoral neck osteotomy was then performed to the templated area. The head and neck were removed. The pulvinar and labrum were excised. The acetabulum was then reamed up to 54 mm with good bleeding cancellous bone obtained. The cup was then irrigated with pulse lavage system. A 54 mm orthodevelopment Legend cup was then impacted in place with excellent stable fixation obtained, placing the cup at about 45 degrees of abduction, 20 degrees of anteversion. The liner was then impacted in place. A screw was not placed. Attention was turned to the femur, which was delivered into the wound with a combination of extension, external rotation, and adduction, and using the hook on the Crawford table to deliver the femur into the wound. The canal was broached up to a size 12 for the Entrada stem system with excellent stable fixation obtained. A trial reduction was then performed with the standard neck offset and 36 mm head balls with various neck lengths. With the +0, he appeared to have equalization of leg lengths and restoration of offset radiographically, and excellent functional stability was noted. The trial broach was removed. The canal was irrigated with the pulse lavage system. The final components were impacted in place with excellent stable fixation obtained once again. The final reduction was performed and once again leg lengths and offset were restored radiographically, using the C-arm radiographically intraoperatively, and excellent functional stability was noted. The wound was then irrigated one more time, and then closed in layers. The fascia of the tensor was closed with #1 Vicryl in a running type stitch. Subcutaneous tissue was closed with 2-0 Vicryl in a simple buried stitch, and the skin was closed with Prineo. Dry, sterile dressing was then applied. He tolerated this well, was transferred to the bed, and taken to recovery room, extubated, in stable condition.  All sponge and needle counts were correct.     Signed By: Kaylen Smith MD     February 16, 2021

## 2021-02-16 NOTE — ANESTHESIA POSTPROCEDURE EVALUATION
Post-Anesthesia Evaluation and Assessment    Cardiovascular Function/Vital Signs  Visit Vitals  /67   Pulse 85   Temp 36.7 °C (98 °F)   Resp 18   Ht 5' 10\" (1.778 m)   Wt 112 kg (247 lb)   SpO2 98%   BMI 35.44 kg/m²       Patient is status post Procedure(s):  RIGHT TOTAL HIP REPLACEMENT ANTERIOR APPROACH WITH C-ARM  **SPEC POP**. Nausea/Vomiting: Controlled. Postoperative hydration reviewed and adequate. Pain:  Pain Scale 1: FLACC (02/16/21 1517)  Pain Intensity 1: 1 (02/16/21 1517)   Managed. Neurological Status:   Neuro (WDL): Exceptions to WDL (02/16/21 1449)   At baseline. Mental Status and Level of Consciousness: Arousable. Pulmonary Status:   O2 Device: Nasal cannula (02/16/21 1455)   Adequate oxygenation and airway patent. Complications related to anesthesia: None    Post-anesthesia assessment completed. No concerns. Patient has met all discharge requirements.     Signed By: Josephine Herron CRNA    February 16, 2021

## 2021-02-17 ENCOUNTER — HOME CARE VISIT (OUTPATIENT)
Dept: HOME HEALTH SERVICES | Facility: HOME HEALTH | Age: 76
End: 2021-02-17

## 2021-02-17 ENCOUNTER — TELEPHONE (OUTPATIENT)
Dept: OTHER | Age: 76
End: 2021-02-17

## 2021-02-17 ENCOUNTER — HOME CARE VISIT (OUTPATIENT)
Dept: SCHEDULING | Facility: HOME HEALTH | Age: 76
End: 2021-02-17
Payer: MEDICARE

## 2021-02-17 VITALS
RESPIRATION RATE: 16 BRPM | HEART RATE: 74 BPM | DIASTOLIC BLOOD PRESSURE: 70 MMHG | OXYGEN SATURATION: 96 % | TEMPERATURE: 98 F | SYSTOLIC BLOOD PRESSURE: 124 MMHG

## 2021-02-17 PROCEDURE — 3331090002 HH PPS REVENUE DEBIT

## 2021-02-17 PROCEDURE — 400018 HH-NO PAY CLAIM PROCEDURE

## 2021-02-17 PROCEDURE — 400013 HH SOC

## 2021-02-17 PROCEDURE — G0151 HHCP-SERV OF PT,EA 15 MIN: HCPCS

## 2021-02-17 PROCEDURE — 3331090001 HH PPS REVENUE CREDIT

## 2021-02-17 NOTE — PROGRESS NOTES
Problem: Self Care Deficits Care Plan (Adult)  Goal: *Acute Goals and Plan of Care (Insert Text)  Description: Initial Occupational Therapy Goals (2/16/2021) Within 7 day(s):    1. Patient will perform grooming standing sinkside with supervision for increased independence with ADLs. 2. Patient will perform LB dressing with supervision & A/E PRN for increased independence with ADLs. 3. Patient will perform toilet transfer with supervision for increased independence with ADLs. 4. Patient will perform all aspects of toileting with supervision for increased independence with ADLs. 5. Patient will independently apply energy conservation techniques with 1 verbal cue(s)for increased independence with ADLs. 6. Patient will perform bathroom mobility with supervision for increased independence/safety with ADLs. Outcome: Progressing Towards Goal   OCCUPATIONAL THERAPY EVALUATION    Patient: Matthew Llamas (66 y.o. male)  Date: 2/16/2021  Primary Diagnosis: Osteoarthritis of right hip [M16.11]  Procedure(s) (LRB):  RIGHT TOTAL HIP REPLACEMENT ANTERIOR APPROACH WITH C-ARM  **SPEC POP** (Right) Day of Surgery   Precautions: Fall, WBAT  PLOF: pt independent for ADLs/functional mobility    ASSESSMENT AND RECOMMENDATIONS:  Based on the objective data described below, the patient presents with RLE decreased ROM and strength affecting LE ADLs. Pt found seated in recliner chair, vitals assessed and WNL, pt reporting pain 1/10. Pt completed upper body dressing with supervision seated in chair. Pt able to thread B feet through underwear/pants without assist, and CGA when standing to pull up to waist. Pt CGA for STS/bathroom mobility with vc for proper body mechanics. Pt ambulated back to recliner, ice applied to R hip. Provided opportunity for pt to voice questions on ADL performance when home, pt has no further concerns.  Patient will benefit from skilled Occupational Therapy intervention to maximize safety/independence with ADLs at d/c.    Education: Reviewed home safety, body mechanics, importance of moving every hour to prevent joint stiffness, role of ice for edema/pain control, Rolling Walker management/safety, and adaptive dressing techniques with patient verbalizing  understanding at this time     Patient will benefit from skilled intervention to address the above impairments. Patient's rehabilitation potential is considered to be Good  Factors which may influence rehabilitation potential include:   [x]             None noted  []             Mental ability/status  []             Medical condition  []             Home/family situation and support systems  []             Safety awareness  []             Pain tolerance/management  []             Other:        PLAN :  Recommendations and Planned Interventions:   [x]               Self Care Training                  []      Therapeutic Activities  [x]               Functional Mobility Training   []      Cognitive Retraining  []               Therapeutic Exercises           []      Endurance Activities  []               Balance Training                    []      Neuromuscular Re-Education  []               Visual/Perceptual Training     [x]      Home Safety Training  [x]               Patient Education                   [x]      Family Training/Education  []               Other (comment):    Frequency/Duration: Patient will be followed by Occupational Therapy 1-2 times per day/4-7 days per week to address goals. Discharge Recommendations: Home health with adult supervision at least 24 hours after d/c  Further Equipment Recommendations for Discharge: N/A     SUBJECTIVE:   Patient stated i'm pretty good.     OBJECTIVE DATA SUMMARY:     Past Medical History:   Diagnosis Date    Acid reflux     Adverse effect of anesthesia     hangover from anes    Arthritis     Atrial fibrillation (HCC)     Paroxysmal    Cancer (HCC)     skin    Chest pain 2/9/2011    DVT of lower extremity (deep venous thrombosis) ([facility] [address]     Right, after horse stepped on me    Hyperlipidemia     Hypertension     Hypopotassemia 6/6/2013    IBS (irritable bowel syndrome)     constipation    Long term (current) use of anticoagulants     LVH (left ventricular hypertrophy)     Near syncope 3/30/2014    Osteoarthritis of right hip 2/15/2021    Other and unspecified hyperlipidemia 1/28/2014    Psychiatric disorder     anxiety    Sleep apnea     advised to bring CPAP day of surgery    SOB (shortness of breath) 2/10/2011     Past Surgical History:   Procedure Laterality Date    HX CATARACT REMOVAL Bilateral 4-28-15    HX HERNIA REPAIR      Inguinal and umbilical    HX KNEE ARTHROSCOPY Right     HX TONSILLECTOMY       Barriers to Learning/Limitations: yes;  physical and altered mental status (i.e.Sedation, Confusion)  Compensate with: visual, verbal, tactile, kinesthetic cues/model    Home Situation/Prior Level of Function: pt mod I for ADLs/functional mobility  Home Situation  Home Environment: Private residence  # Steps to Enter: 3  One/Two Story Residence: Two story  # of Interior Steps: 13  Interior Rails: Left  Living Alone: No  Support Systems: Spouse/Significant Other/Partner  Patient Expects to be Discharged to[de-identified] Private residence  Current DME Used/Available at Home: Jeananne Garre, rolling, Jeananne Garre, rollator  Tub or Shower Type: Shower  []  Right hand dominant   []  Left hand dominant    Cognitive/Behavioral Status:  Neurologic State: Alert  Orientation Level: Oriented X4  Cognition: Follows commands  Safety/Judgement: Awareness of environment    Skin: R hip incision w/ Mepilex   Edema: compression hose in place & applied ice     Coordination: BUE  Coordination: Within functional limits  Fine Motor Skills-Upper: Left Intact; Right Intact    Gross Motor Skills-Upper: Left Intact; Right Intact    Balance:  Sitting: Intact  Standing: Intact; With support    Strength: BUE  Strength: Generally decreased, functional    Tone & Sensation:BUE  Tone: Normal  Sensation: Impaired(R hip)    Range of Motion: BUE  AROM: Generally decreased, functional    Functional Mobility and Transfers for ADLs:  Bed Mobility:  Scooting: Stand-by assistance    Transfers:  Sit to Stand: Contact guard assistance(vc)    ADL Assessment:  Feeding: Modified independent  Oral Facial Hygiene/Grooming: Stand-by assistance  Bathing: Minimum assistance  Upper Body Dressing: Supervision  Lower Body Dressing: Contact guard assistance  Toileting: Stand by assistance    ADL Intervention:  Upper Body Dressing Assistance  Dressing Assistance: Supervision  Pullover Shirt: Supervision    Lower Body Dressing Assistance  Dressing Assistance: Contact guard assistance  Underpants: Contact guard assistance  Leg Crossed Method Used: No  Position Performed: Seated in chair  Cues: Verbal cues provided;Visual cues provided    Cognitive Retraining  Safety/Judgement: Awareness of environment    Pain:  Pain level pre-treatment: 1/10  Pain level post-treatment: 1/10  Pain Intervention(s): Medication administer by Nursing (see MAR); Rest, Ice, Repositioning   Response to intervention: Nurse notified, see doc flow     Activity Tolerance:   Fair. Patient able to stand ~5 minute(s). Patient able to complete ADLs with intermittent rest breaks. Patient limited by pain, strength, ROM. Patient unsteady. Please refer to the flowsheet for vital signs taken during this treatment. After treatment:   [x]  Patient left in no apparent distress sitting up in chair  []  Patient sitting on EOB  []  Patient left in no apparent distress in bed  [x]  Call bell left within reach  [x]  Nursing notified  []  Caregiver present  [x]  Ice applied  []  SCD's on while back in bed  [] Bed alarm activated    COMMUNICATION/EDUCATION:   Communication/Collaboration:  [x]       Role of Occupational Therapy in the acute care setting. [x]      Home safety education was provided and the patient/caregiver indicated understanding.   [x] Patient/family have participated as able in goal setting and plan of care. [x]      Patient/family agree to work toward stated goals and plan of care. []      Patient understands intent and goals of therapy, but is neutral about his/her participation. []      Patient is unable to participate in plan of care at this time. Thank you for this referral.  Norberto Paget, OTR/L  Time Calculation: 23 mins    Eval Complexity: History: MEDIUM Complexity : Expanded review of history including physical, cognitive and psychosocial  history ; Examination: LOW Complexity : 1-3 performance deficits relating to physical, cognitive , or psychosocial skils that result in activity limitations and / or participation restrictions ;    Decision Making:LOW Complexity : No comorbidities that affect functional and no verbal or physical assistance needed to complete eval tasks

## 2021-02-17 NOTE — TELEPHONE ENCOUNTER
Vanda Gray post total hip replacement follow up call. Home Health has come to house. Discussed using ice, distraction, and repositioning to manage pain besides using medication. Reminded patient not to get the wound wet and to cover it before bathing. Reminded patient the importance of doing exercises as shown. Reminded patient to change positions frequently and walking at least 3-4 times each day to promote circulation, decrease stiffness and soreness. Reinforced swelling, bruising and increased pain is normal after surgery. Instructed patient to lie down in bed and elevate legs on pillows above heart if having swelling to lower extremities. Reminded to healthy eat foods along with drinking plenty of fluids to promote healing. Reminded not  to take medication on an empty stomach to prevent nausea. Reminded to take a stool softener while taking a narcotic due to constipation being a side effect of anesthesia and narcotics. Taking medications as prescribed by provider. Vanda Gray knows when their follow up appointment is.       Orthopedic Navigator

## 2021-02-17 NOTE — PROGRESS NOTES
Problem: Mobility Impaired (Adult and Pediatric)  Goal: *Acute Goals and Plan of Care (Insert Text)  Description: Physical Therapy short term goals initiated 02/16/21, to be achieved in 2 days. Pt will:   1. Perform bed mobility with indep in prep for OOB activity. 2. Perform sit <> stand transfers with LRAD and S in prep for functional mobility and ambulation. 3. Ambulate 150 ft with LRAD and S in prep for household and community mobility. 4. Ascend/descend 3 stairs with LRAD and S for safe home entry. Note: [x]  Patient has met MD yonny garcia for d/c home   [x]  Recommend HH with 24 hour adult care   []  Benefit from additional acute PT session to address:      PHYSICAL THERAPY EVALUATION    Patient: Guille Santana (72 y.o. male)  Date: 2/16/2021  Primary Diagnosis: Osteoarthritis of right hip [M16.11]  Procedure(s) (LRB):  RIGHT TOTAL HIP REPLACEMENT ANTERIOR APPROACH WITH C-ARM  **SPEC POP** (Right) Day of Surgery   Precautions:  Fall, WBAT  WBAT  PLOF: Pt was independent with household and community mobility with no AD PTA. ASSESSMENT :  Based on the objective data described below, the patient presents with decr R LE strength and ROM, decr activity tolerance, decr balance, and R hip pain resulting in deficits in transfers and gait quality and tolerance. Pt seen with OT for additional set of skilled hands. Pt sitting in recliner on PT entry, rated R hip pain 1/10. Pt required CGA for sit <> stand transfers with rollator. Pt ambulated 100ft in dunham with rollator and CGA demonstrating decr speed, progressing to reciprocal pattern. Pt ascended/descended 3 stairs with B HR and CGA. Pt educated on importance of early mobility, use of ice to decr pain and inflammation, and home safety. Vc for safety and appropriate use of rollator provided t/o session. Pt left sitting in recliner with all needs met, all questions answered, and ice to R hip.  Pt would benefit from additional skilled therapy after discharge in order to incr functional mobility and promote return to PLOF. Recommend HHPT after discharge. Patient will benefit from skilled intervention to address the above impairments. Patient's rehabilitation potential is considered to be Good  Factors which may influence rehabilitation potential include:   []         None noted  []         Mental ability/status  []         Medical condition  []         Home/family situation and support systems  []         Safety awareness  [x]         Pain tolerance/management  []         Other:      PLAN :  Recommendations and Planned Interventions:   [x]           Bed Mobility Training             []    Neuromuscular Re-Education  [x]           Transfer Training                   []    Orthotic/Prosthetic Training  [x]           Gait Training                          [x]    Modalities  [x]           Therapeutic Exercises           []    Edema Management/Control  [x]           Therapeutic Activities            [x]    Family Training/Education  [x]           Patient Education  []           Other (comment):    Frequency/Duration: Patient will be followed by physical therapy twice daily to address goals. Discharge Recommendations: Home Health  Further Equipment Recommendations for Discharge: N/A     SUBJECTIVE:   Patient stated I feel pretty good.     OBJECTIVE DATA SUMMARY:     Past Medical History:   Diagnosis Date    Acid reflux     Adverse effect of anesthesia     hangover from anes    Arthritis     Atrial fibrillation (HCC)     Paroxysmal    Cancer (HCC)     skin    Chest pain 2/9/2011    DVT of lower extremity (deep venous thrombosis) (HCC)     Right, after horse stepped on me    Hyperlipidemia     Hypertension     Hypopotassemia 6/6/2013    IBS (irritable bowel syndrome)     constipation    Long term (current) use of anticoagulants     LVH (left ventricular hypertrophy)     Near syncope 3/30/2014    Osteoarthritis of right hip 2/15/2021    Other and unspecified hyperlipidemia 1/28/2014    Psychiatric disorder     anxiety    Sleep apnea     advised to bring CPAP day of surgery    SOB (shortness of breath) 2/10/2011     Past Surgical History:   Procedure Laterality Date    HX CATARACT REMOVAL Bilateral 4-28-15    HX HERNIA REPAIR      Inguinal and umbilical    HX KNEE ARTHROSCOPY Right     HX TONSILLECTOMY       Barriers to Learning/Limitations: yes;  other post-anesthesia  Compensate with: Verbal Cues and Tactile Cues  Home Situation:  Home Situation  Home Environment: Private residence  # Steps to Enter: 3  One/Two Story Residence: Two story  # of Interior Steps: 15  Interior Rails: Left  Living Alone: No  Support Systems: Spouse/Significant Other/Partner  Patient Expects to be Discharged to[de-identified] Private residence  Current DME Used/Available at Home: Leo Gambler, rolling, Walker, rollator  Tub or Shower Type: Shower  Critical Behavior:  Neurologic State: Alert  Orientation Level: Oriented X4  Cognition: Follows commands  Safety/Judgement: Awareness of environment  Psychosocial  Patient Behaviors: Calm; Cooperative  Skin Condition/Temp: Dry;Warm  Skin Integrity: Incision (comment)  Skin Integumentary  Skin Color: Appropriate for ethnicity  Skin Condition/Temp: Dry;Warm  Skin Integrity: Incision (comment)  Strength:    Strength: Generally decreased, functional  Tone & Sensation:   Tone: Normal  Sensation: Impaired(R hip)  Range Of Motion:  AROM: Generally decreased, functional  Functional Mobility:  Bed Mobility:  Scooting: Stand-by assistance  Transfers:  Sit to Stand: Contact guard assistance(vc)  Stand to Sit: Contact guard assistance(vc)  Balance:   Sitting: Intact  Standing: Intact; With support  Ambulation/Gait Training:  Distance (ft): 100 Feet (ft)  Assistive Device: Gait belt;Walker, rollator  Ambulation - Level of Assistance: Contact guard assistance(vc)  Gait Abnormalities: Decreased step clearance  Stance: Right decreased  Speed/Eileen: Slow  Step Length: Left shortened;Right shortened  Interventions: Safety awareness training;Verbal cues; Tactile cues  Stairs:  Number of Stairs Trained: 3  Stairs - Level of Assistance: Contact guard assistance(vc)  Rail Use: Both  Pain:  Pain level pre-treatment: 1/10   Pain level post-treatment: 1/10   Pain Intervention(s) : Medication (see MAR); Rest, Ice, Repositioning  Response to intervention: Nurse notified, See doc flow  Activity Tolerance:   Pt tolerated amb in dunham with RW and CGA with no LOB, R hip pain rated 1/10 t/o session. Please refer to the flowsheet for vital signs taken during this treatment. After treatment:   [x]         Patient left in no apparent distress sitting up in chair  []         Patient left in no apparent distress in bed  [x]         Call bell left within reach  [x]         Nursing notified  []         Caregiver present  []         Bed alarm activated  []         SCDs applied    COMMUNICATION/EDUCATION:   [x]         Role of Physical Therapy in the acute care setting. [x]         Fall prevention education was provided and the patient/caregiver indicated understanding. [x]         Patient/family have participated as able in goal setting and plan of care. [x]         Patient/family agree to work toward stated goals and plan of care. []         Patient understands intent and goals of therapy, but is neutral about his/her participation. []         Patient is unable to participate in goal setting/plan of care: ongoing with therapy staff.  []         Other:     Thank you for this referral.  Lorinda Kanner   Time Calculation: 20 mins      Eval Complexity: History: LOW Complexity : Zero comorbidities / personal factors that will impact the outcome / POCExam:LOW Complexity : 1-2 Standardized tests and measures addressing body structure, function, activity limitation and / or participation in recreation  Presentation: LOW Complexity : Stable, uncomplicated  Clinical Decision Making:Low Complexity    Overall Complexity:LOW none

## 2021-02-18 ENCOUNTER — HOME CARE VISIT (OUTPATIENT)
Dept: HOME HEALTH SERVICES | Facility: HOME HEALTH | Age: 76
End: 2021-02-18
Payer: MEDICARE

## 2021-02-18 ENCOUNTER — HOME CARE VISIT (OUTPATIENT)
Dept: SCHEDULING | Facility: HOME HEALTH | Age: 76
End: 2021-02-18
Payer: MEDICARE

## 2021-02-18 PROCEDURE — 3331090001 HH PPS REVENUE CREDIT

## 2021-02-18 PROCEDURE — 3331090002 HH PPS REVENUE DEBIT

## 2021-02-18 PROCEDURE — G0299 HHS/HOSPICE OF RN EA 15 MIN: HCPCS

## 2021-02-18 PROCEDURE — A6213 FOAM DRG >16<=48 SQ IN W/BDR: HCPCS

## 2021-02-19 ENCOUNTER — HOME CARE VISIT (OUTPATIENT)
Dept: SCHEDULING | Facility: HOME HEALTH | Age: 76
End: 2021-02-19
Payer: MEDICARE

## 2021-02-19 PROCEDURE — 3331090001 HH PPS REVENUE CREDIT

## 2021-02-19 PROCEDURE — G0157 HHC PT ASSISTANT EA 15: HCPCS

## 2021-02-19 PROCEDURE — 3331090002 HH PPS REVENUE DEBIT

## 2021-02-20 PROCEDURE — 3331090001 HH PPS REVENUE CREDIT

## 2021-02-20 PROCEDURE — 3331090002 HH PPS REVENUE DEBIT

## 2021-02-21 VITALS
DIASTOLIC BLOOD PRESSURE: 78 MMHG | HEART RATE: 71 BPM | OXYGEN SATURATION: 97 % | SYSTOLIC BLOOD PRESSURE: 118 MMHG | TEMPERATURE: 98.6 F | RESPIRATION RATE: 18 BRPM

## 2021-02-21 PROCEDURE — 3331090002 HH PPS REVENUE DEBIT

## 2021-02-21 PROCEDURE — 3331090001 HH PPS REVENUE CREDIT

## 2021-02-22 ENCOUNTER — HOME CARE VISIT (OUTPATIENT)
Dept: SCHEDULING | Facility: HOME HEALTH | Age: 76
End: 2021-02-22
Payer: MEDICARE

## 2021-02-22 VITALS
DIASTOLIC BLOOD PRESSURE: 71 MMHG | OXYGEN SATURATION: 96 % | RESPIRATION RATE: 14 BRPM | TEMPERATURE: 96.5 F | HEART RATE: 63 BPM | SYSTOLIC BLOOD PRESSURE: 132 MMHG

## 2021-02-22 VITALS
OXYGEN SATURATION: 98 % | HEART RATE: 67 BPM | TEMPERATURE: 97.7 F | DIASTOLIC BLOOD PRESSURE: 70 MMHG | SYSTOLIC BLOOD PRESSURE: 132 MMHG

## 2021-02-22 PROCEDURE — G0495 RN CARE TRAIN/EDU IN HH: HCPCS

## 2021-02-22 PROCEDURE — 3331090001 HH PPS REVENUE CREDIT

## 2021-02-22 PROCEDURE — G0157 HHC PT ASSISTANT EA 15: HCPCS

## 2021-02-22 PROCEDURE — 3331090002 HH PPS REVENUE DEBIT

## 2021-02-23 VITALS
HEART RATE: 76 BPM | TEMPERATURE: 97.7 F | OXYGEN SATURATION: 96 % | SYSTOLIC BLOOD PRESSURE: 120 MMHG | DIASTOLIC BLOOD PRESSURE: 78 MMHG

## 2021-02-23 PROCEDURE — 3331090002 HH PPS REVENUE DEBIT

## 2021-02-23 PROCEDURE — 3331090001 HH PPS REVENUE CREDIT

## 2021-02-24 ENCOUNTER — HOME CARE VISIT (OUTPATIENT)
Dept: SCHEDULING | Facility: HOME HEALTH | Age: 76
End: 2021-02-24
Payer: MEDICARE

## 2021-02-24 PROCEDURE — 3331090002 HH PPS REVENUE DEBIT

## 2021-02-24 PROCEDURE — G0157 HHC PT ASSISTANT EA 15: HCPCS

## 2021-02-24 PROCEDURE — 3331090001 HH PPS REVENUE CREDIT

## 2021-02-25 VITALS
TEMPERATURE: 97.2 F | HEART RATE: 70 BPM | OXYGEN SATURATION: 98 % | DIASTOLIC BLOOD PRESSURE: 62 MMHG | SYSTOLIC BLOOD PRESSURE: 120 MMHG

## 2021-02-25 PROCEDURE — 3331090002 HH PPS REVENUE DEBIT

## 2021-02-25 PROCEDURE — 3331090001 HH PPS REVENUE CREDIT

## 2021-02-26 ENCOUNTER — HOME CARE VISIT (OUTPATIENT)
Dept: SCHEDULING | Facility: HOME HEALTH | Age: 76
End: 2021-02-26
Payer: MEDICARE

## 2021-02-26 PROCEDURE — 3331090002 HH PPS REVENUE DEBIT

## 2021-02-26 PROCEDURE — 3331090001 HH PPS REVENUE CREDIT

## 2021-02-26 PROCEDURE — G0157 HHC PT ASSISTANT EA 15: HCPCS

## 2021-02-27 ENCOUNTER — HOME CARE VISIT (OUTPATIENT)
Dept: SCHEDULING | Facility: HOME HEALTH | Age: 76
End: 2021-02-27
Payer: MEDICARE

## 2021-02-27 PROCEDURE — 3331090002 HH PPS REVENUE DEBIT

## 2021-02-27 PROCEDURE — G0299 HHS/HOSPICE OF RN EA 15 MIN: HCPCS

## 2021-02-27 PROCEDURE — 3331090001 HH PPS REVENUE CREDIT

## 2021-02-28 PROCEDURE — 3331090001 HH PPS REVENUE CREDIT

## 2021-02-28 PROCEDURE — 3331090002 HH PPS REVENUE DEBIT

## 2021-03-01 ENCOUNTER — HOME CARE VISIT (OUTPATIENT)
Dept: SCHEDULING | Facility: HOME HEALTH | Age: 76
End: 2021-03-01
Payer: MEDICARE

## 2021-03-01 VITALS
DIASTOLIC BLOOD PRESSURE: 74 MMHG | HEART RATE: 75 BPM | RESPIRATION RATE: 20 BRPM | TEMPERATURE: 98.2 F | OXYGEN SATURATION: 96 % | SYSTOLIC BLOOD PRESSURE: 134 MMHG

## 2021-03-01 VITALS
HEART RATE: 74 BPM | DIASTOLIC BLOOD PRESSURE: 66 MMHG | TEMPERATURE: 98.3 F | RESPIRATION RATE: 16 BRPM | OXYGEN SATURATION: 97 % | SYSTOLIC BLOOD PRESSURE: 144 MMHG

## 2021-03-01 VITALS
DIASTOLIC BLOOD PRESSURE: 82 MMHG | OXYGEN SATURATION: 97 % | SYSTOLIC BLOOD PRESSURE: 132 MMHG | HEART RATE: 68 BPM | TEMPERATURE: 97.2 F

## 2021-03-01 PROCEDURE — G0151 HHCP-SERV OF PT,EA 15 MIN: HCPCS

## 2021-03-01 PROCEDURE — 3331090001 HH PPS REVENUE CREDIT

## 2021-03-01 PROCEDURE — 3331090002 HH PPS REVENUE DEBIT

## 2021-03-01 NOTE — PROGRESS NOTES
Arden Acevedo,     I just wanted to make you aware that Mr. Lewis Davis has been discharged from skilled nursing at this time due to goals being met. Dressing changed, incision looks good without signs of infection noted. PT to perform final discharge from home health services. Thank you for allowing [address] to care for your patient.     Thank you,  Taniya Oquendo RN

## 2021-10-17 ENCOUNTER — HOSPITAL ENCOUNTER (EMERGENCY)
Age: 76
Discharge: HOME | End: 2021-10-17
Attending: PHYSICIAN ASSISTANT
Payer: MEDICARE

## 2021-10-17 VITALS
HEART RATE: 68 BPM | OXYGEN SATURATION: 95 % | SYSTOLIC BLOOD PRESSURE: 124 MMHG | WEIGHT: 233 LBS | TEMPERATURE: 98.1 F | RESPIRATION RATE: 15 BRPM | HEIGHT: 69 IN | BODY MASS INDEX: 34.51 KG/M2 | DIASTOLIC BLOOD PRESSURE: 74 MMHG

## 2021-10-17 DIAGNOSIS — M54.16 LUMBAR BACK PAIN WITH RADICULOPATHY AFFECTING RIGHT LOWER EXTREMITY: Primary | ICD-10-CM

## 2021-10-17 PROCEDURE — 99284 EMERGENCY DEPT VISIT MOD MDM: CPT

## 2021-10-17 PROCEDURE — 96375 TX/PRO/DX INJ NEW DRUG ADDON: CPT

## 2021-10-17 PROCEDURE — 96374 THER/PROPH/DIAG INJ IV PUSH: CPT

## 2021-10-17 PROCEDURE — 74011250636 HC RX REV CODE- 250/636: Performed by: PHYSICIAN ASSISTANT

## 2021-10-17 RX ORDER — MORPHINE SULFATE 4 MG/ML
4 INJECTION INTRAVENOUS
Status: COMPLETED | OUTPATIENT
Start: 2021-10-17 | End: 2021-10-17

## 2021-10-17 RX ORDER — DEXAMETHASONE SODIUM PHOSPHATE 10 MG/ML
10 INJECTION INTRAMUSCULAR; INTRAVENOUS ONCE
Status: COMPLETED | OUTPATIENT
Start: 2021-10-17 | End: 2021-10-17

## 2021-10-17 RX ORDER — DIAZEPAM 5 MG/1
TABLET ORAL
Qty: 15 TABLET | Refills: 0 | Status: SHIPPED | OUTPATIENT
Start: 2021-10-17

## 2021-10-17 RX ORDER — DIAZEPAM 10 MG/2ML
5 INJECTION INTRAMUSCULAR ONCE
Status: COMPLETED | OUTPATIENT
Start: 2021-10-17 | End: 2021-10-17

## 2021-10-17 RX ADMIN — DIAZEPAM 5 MG: 5 INJECTION, SOLUTION INTRAMUSCULAR; INTRAVENOUS at 12:23

## 2021-10-17 RX ADMIN — DEXAMETHASONE SODIUM PHOSPHATE 10 MG: 10 INJECTION INTRAMUSCULAR; INTRAVENOUS at 12:24

## 2021-10-17 RX ADMIN — MORPHINE SULFATE 4 MG: 4 INJECTION INTRAVENOUS at 12:24

## 2021-10-17 NOTE — DISCHARGE INSTRUCTIONS
Rest   Ice/heat  Gentle stretching and massage.   Exercises  Continue home pain medication  Muscle relaxer as prescribed (Valium)  Talk to your orthopedic spine specialist regarding your ER visit

## 2021-10-17 NOTE — ED PROVIDER NOTES
EMERGENCY DEPARTMENT HISTORY AND PHYSICAL EXAM    Date: 10/17/2021  Patient Name: Yareli Garcia    History of Presenting Illness     Time Seen: 18    Chief Complaint   Patient presents with    Back Pain       History Provided By: Patient    Additional History (Context):   Yareli Garcia is a 70 y.o. male presents emergency room with complaints of severe back pain with radiation down into his right leg. Patient came to the emergency room by EMS. On September 10, 2021, patient was involved in a significant trauma. [employer] off a horse. Suffered a subarachnoid hemorrhage, traumatic hemopneumothorax with multiple rib fractures, left scapular fracture, nondisplaced fractures to his left symphysis pubis, left T9 transverse process fracture. Patient had to be flown from the scene to [facility].  Patient was admitted to the hospital for 12 days. Since that time he has been followed outpatient. Since his fall, he has had a lot of problems with his lower back. 2 weeks ago received epidural injection for back pain which had some relief. Last night, he went out for dinner and sat on a hard seat. Noticed it was causing a lot of discomfort to his back. Today woke up with severe pain in his lower back with radiation down his right leg. Has had sciatica in the past.  Denies any bowel or bladder incontinence. No saddle anesthesia. Took oxycodone and Naprosyn prior to arrival with no relief. PCP: Raul Meredith MD    Current Outpatient Medications   Medication Sig Dispense Refill    diazePAM (Valium) 5 mg tablet Take 1/2 to 1 tablet by mouth every 8 hours as needed for muscle pain and stiffness  Indications: muscle spasm 15 Tablet 0    sennosides (SENNA PO) Take  by mouth daily as needed.  linaCLOtide (Linzess) 145 mcg cap capsule Take 145 mcg by mouth Daily (before breakfast).  Indications: chronic idiopathic constipation      DULoxetine (CYMBALTA) 60 mg capsule Take 60 mg by mouth nightly. Indications: repeated episodes of anxiety      amLODIPine (NORVASC) 2.5 mg tablet Take 2.5 mg by mouth nightly.  cholecalciferol (VITAMIN D3) 1,000 unit cap Take 1,000 Units by mouth daily.  amiodarone (CORDARONE) 200 mg tablet Take 1 Tab by mouth daily. (Patient taking differently: Take 200 mg by mouth nightly.) 30 Tab 6    indapamide (LOZOL) 2.5 mg tablet TAKE 1 TABLET BY MOUTH EVERY DAY IN THE MORNING (Patient taking differently: Take 2.5 mg by mouth nightly.) 30 Tab 5    pravastatin (PRAVACHOL) 40 mg tablet Take 1 Tab by mouth nightly.  30 Tab 3    KLOR-CON M20 20 mEq tablet TAKE 1 TABLET BY MOUTH EVERY DAY (Patient taking differently: Take 40 mEq by mouth nightly.) 90 Tab 2       Past History     Past Medical History:  Past Medical History:   Diagnosis Date    Acid reflux     Adverse effect of anesthesia     hangover from anes    Arthritis     Atrial fibrillation (HCC)     Paroxysmal    Cancer (HCC)     skin    Chest pain 2/9/2011    DVT of lower extremity (deep venous thrombosis) (HCC)     Right, after horse stepped on me    Hyperlipidemia     Hypertension     Hypopotassemia 6/6/2013    IBS (irritable bowel syndrome)     constipation    Long term (current) use of anticoagulants     LVH (left ventricular hypertrophy)     Near syncope 3/30/2014    Osteoarthritis of right hip 2/15/2021    Other and unspecified hyperlipidemia 1/28/2014    Psychiatric disorder     anxiety    Sleep apnea     advised to bring CPAP day of surgery    SOB (shortness of breath) 2/10/2011       Past Surgical History:  Past Surgical History:   Procedure Laterality Date    HX CATARACT REMOVAL Bilateral 4-28-15    HX HERNIA REPAIR      Inguinal and umbilical    HX KNEE ARTHROSCOPY Right     HX TONSILLECTOMY         Family History:  Family History   Problem Relation Age of Onset    Hypertension Father        Social History:  Social History     Tobacco Use    Smoking status: Former Smoker Packs/day: 0.50     Years: 20.00     Pack years: 10.00     Types: Cigarettes     Quit date: 2/10/2005     Years since quittin.6    Smokeless tobacco: Never Used   Vaping Use    Vaping Use: Never used   Substance Use Topics    Alcohol use: Yes     Comment: twice /week    Drug use: No       Allergies: Allergies   Allergen Reactions    Statins-Hmg-Coa Reductase Inhibitors Other (comments)     Body aches and tiredness    Tylenol [Acetaminophen] Other (comments)     Body aches and tiredness         Review of Systems   Review of Systems   Respiratory: Negative for chest tightness and shortness of breath. Cardiovascular: Negative for chest pain and palpitations. Gastrointestinal: Negative for abdominal pain. Musculoskeletal: Positive for back pain. Negative for myalgias, neck pain and neck stiffness. Neurological: Negative for weakness and numbness. All other systems reviewed and are negative. Physical Exam     Vitals:    10/17/21 1231 10/17/21 1236 10/17/21 1300 10/17/21 1318   BP:   124/74    Pulse:  61  68   Resp:  14  15   Temp:       SpO2: 95%  95%    Weight:       Height:         Physical Exam  Vitals and nursing note reviewed. Constitutional:       General: He is not in acute distress. Appearance: Normal appearance. He is well-developed, well-groomed and normal weight. He is not ill-appearing. Comments: Uncomfortable appearing 79-year-old male lying in bed. Vital signs are stable. Cooperative. Cardiovascular:      Rate and Rhythm: Normal rate and regular rhythm. Heart sounds: Normal heart sounds. Pulmonary:      Effort: Pulmonary effort is normal.      Breath sounds: Normal breath sounds. Abdominal:      Palpations: Abdomen is soft. Tenderness: There is no abdominal tenderness. Musculoskeletal:      Thoracic back: Normal.      Lumbar back: Tenderness and bony tenderness present. No swelling. Decreased range of motion.  Negative right straight leg raise test and negative left straight leg raise test.        Back:       Comments: Tenderness to palpation in the lumbar sacral region extending into the right glute   Skin:     General: Skin is warm and dry. Neurological:      Mental Status: He is alert and oriented to person, place, and time. Psychiatric:         Mood and Affect: Mood normal.         Behavior: Behavior normal. Behavior is cooperative. Nursing note and vitals reviewed         Diagnostic Study Results     Labs -   No results found for this or any previous visit (from the past 12 hour(s)). Radiologic Studies   No orders to display     CT Results  (Last 48 hours)    None        CXR Results  (Last 48 hours)    None            Medical Decision Making   I am the first provider for this patient. I reviewed the vital signs, available nursing notes, past medical history, past surgical history, family history and social history. Vital Signs-Reviewed the patient's vital signs. Records Reviewed: Nursing Notes, Old Medical Records, Previous Radiology Studies and Previous Laboratory Studies    DDX:  Lumbar back pain with radiculopathy right leg  Recent trauma from fall off horse  Extensive injuries    Provider Notes:   70 y.o. male     Procedures:  Procedures    ED Course:   Initial assessment performed. The patients presenting problems have been discussed, and they are in agreement with the care plan formulated and outlined with them. I have encouraged them to ask questions as they arise throughout their visit. ED Physician Discussion Note:   Patient with pretty significant lower back pain with radiculopathy involving the right leg  Again, recent trauma from fall from horse  Extensive injuries sustained from fall requiring lengthy hospitalization and surgical intervention  Patient with low back issues already having had epidural performed a little over a week ago    Patient was given morphine, Valium and Decadron here.   This seemed to help him tremendously as he is resting much more comfortably. Told patient that he should continue to take his home oxycodone for severe pain. We will also add in Valium orally. Patient plans on calling his orthopedic spine specialist tomorrow/pain management provider for an update regarding his ER visit. Diagnosis and Disposition       DISCHARGE NOTE:  Zen Mitchell  results have been reviewed with him. He has been counseled regarding his diagnosis, treatment, and plan. He verbally conveys understanding and agreement of the signs, symptoms, diagnosis, treatment and prognosis and additionally agrees to follow up as discussed. He also agrees with the care-plan and conveys that all of his questions have been answered. I have also provided discharge instructions for him that include: educational information regarding their diagnosis and treatment, and list of reasons why they would want to return to the ED prior to their follow-up appointment, should his condition change. He has been provided with education for proper emergency department utilization. CLINICAL IMPRESSION:    1. Lumbar back pain with radiculopathy affecting right lower extremity        PLAN:  1. D/C Home  2. Discharge Medication List as of 10/17/2021  1:21 PM      START taking these medications    Details   diazePAM (Valium) 5 mg tablet Take 1/2 to 1 tablet by mouth every 8 hours as needed for muscle pain and stiffness  Indications: muscle spasm, Normal, Disp-15 Tablet, R-0         CONTINUE these medications which have NOT CHANGED    Details   sennosides (SENNA PO) Take  by mouth daily as needed., Historical Med      linaCLOtide (Linzess) 145 mcg cap capsule Take 145 mcg by mouth Daily (before breakfast). Indications: chronic idiopathic constipation, Historical Med      DULoxetine (CYMBALTA) 60 mg capsule Take 60 mg by mouth nightly.  Indications: repeated episodes of anxiety, Historical Med      amLODIPine (NORVASC) 2.5 mg tablet Take 2.5 mg by mouth nightly., Historical Med      cholecalciferol (VITAMIN D3) 1,000 unit cap Take 1,000 Units by mouth daily. , Historical Med      amiodarone (CORDARONE) 200 mg tablet Take 1 Tab by mouth daily. , Normal, Disp-30 Tab, R-6      indapamide (LOZOL) 2.5 mg tablet TAKE 1 TABLET BY MOUTH EVERY DAY IN THE MORNING, Normal, Disp-30 Tab, R-5      pravastatin (PRAVACHOL) 40 mg tablet Take 1 Tab by mouth nightly., Normal, Disp-30 Tab, R-3      KLOR-CON M20 20 mEq tablet TAKE 1 TABLET BY MOUTH EVERY DAY, Normal, Disp-90 Tab, R-2           3. Follow-up Information     Follow up With Specialties Details Why Contact Info    Olesya Gao MD Internal Medicine Call  Notify your PCP of today's ER visit and for close follow-up Malika  Dr Weathers [address] [place] [address] [phone]      Orthopedic surgery/spine surgeon  Call  Call tomorrow to advise of ER visit and for close follow-up care     [facility] EMERGENCY DEPT Emergency Medicine  If symptoms worsen, As needed [address] [id number] [phone]        ____________________________________     Please note that this dictation was completed with [employer], the computer voice recognition software. Quite often unanticipated grammatical, syntax, homophones, and other interpretive errors are inadvertently transcribed by the computer software. Please disregard these errors. Please excuse any errors that have escaped final proofreading.

## 2021-10-17 NOTE — ED TRIAGE NOTES
Woke up suddenly with R sided back and hip pain. Hx of sciatica. Feels the same.   Unrelieved with percocet and naproxen at home

## 2021-12-15 NOTE — Clinical Note
On-call message 12/14/2021 PM    Patient calling saying she had a steroid injection (?  Epidural) and her sugar was over 300, she had been advised by the specialist to call her PCP if over 300. Blood sugar 350 last night prior to her usual 10 units of Humalog-I advised her to take 12 units, monitor her blood sugar, drink plenty of fluids, and call back if there was a problem. Sheath #1: Closed using R-Band. Radial band pressure set at: 15.

## 2022-03-19 PROBLEM — M16.11 OSTEOARTHRITIS OF RIGHT HIP: Status: ACTIVE | Noted: 2021-02-15

## 2022-11-14 ENCOUNTER — HOSPITAL ENCOUNTER (OUTPATIENT)
Dept: LAB | Age: 77
Discharge: HOME | End: 2022-11-14
Payer: MEDICARE

## 2022-11-14 ENCOUNTER — TRANSCRIBE ORDER (OUTPATIENT)
Dept: REGISTRATION | Age: 77
End: 2022-11-14

## 2022-11-14 DIAGNOSIS — I25.10 CORONARY ATHEROSCLEROSIS OF NATIVE CORONARY ARTERY: Primary | ICD-10-CM

## 2022-11-14 DIAGNOSIS — I25.10 CORONARY ATHEROSCLEROSIS OF NATIVE CORONARY ARTERY: ICD-10-CM

## 2022-11-14 LAB
ALBUMIN SERPL-MCNC: 3.8 G/DL (ref 3.4–5)
ALBUMIN/GLOB SERPL: 1.3 {RATIO} (ref 0.8–1.7)
ALP SERPL-CCNC: 79 U/L (ref 45–117)
ALT SERPL-CCNC: 22 U/L (ref 16–61)
ANION GAP SERPL CALC-SCNC: 4 MMOL/L (ref 3–18)
AST SERPL-CCNC: 15 U/L (ref 10–38)
BILIRUB SERPL-MCNC: 0.4 MG/DL (ref 0.2–1)
BUN SERPL-MCNC: 17 MG/DL (ref 7–18)
BUN/CREAT SERPL: 18 (ref 12–20)
CALCIUM SERPL-MCNC: 9.3 MG/DL (ref 8.5–10.1)
CHLORIDE SERPL-SCNC: 104 MMOL/L (ref 100–111)
CO2 SERPL-SCNC: 31 MMOL/L (ref 21–32)
CREAT SERPL-MCNC: 0.95 MG/DL (ref 0.6–1.3)
GLOBULIN SER CALC-MCNC: 2.9 G/DL (ref 2–4)
GLUCOSE SERPL-MCNC: 97 MG/DL (ref 74–99)
POTASSIUM SERPL-SCNC: 3.7 MMOL/L (ref 3.5–5.5)
PROT SERPL-MCNC: 6.7 G/DL (ref 6.4–8.2)
SODIUM SERPL-SCNC: 139 MMOL/L (ref 136–145)
T4 FREE SERPL-MCNC: 1 NG/DL (ref 0.7–1.5)
TSH SERPL DL<=0.05 MIU/L-ACNC: 4.09 UIU/ML (ref 0.36–3.74)

## 2022-11-14 PROCEDURE — 84480 ASSAY TRIIODOTHYRONINE (T3): CPT

## 2022-11-14 PROCEDURE — 36415 COLL VENOUS BLD VENIPUNCTURE: CPT

## 2022-11-14 PROCEDURE — 84443 ASSAY THYROID STIM HORMONE: CPT

## 2022-11-14 PROCEDURE — 84439 ASSAY OF FREE THYROXINE: CPT

## 2022-11-14 PROCEDURE — 80053 COMPREHEN METABOLIC PANEL: CPT

## 2022-11-15 LAB
FAX TO INFO,FAXT: NORMAL
FAX TO NUMBER,FAXN: NORMAL
T3 SERPL-MCNC: 85 NG/DL (ref 71–180)

## 2023-03-28 NOTE — DISCHARGE INSTRUCTIONS
[address] [place] [address] Sports Medicine   Patient Discharge Instructions    Alena Mcgarry / [id number] : 1945    Admitted 2021 Discharged: 2021     IF YOU HAVE ANY PROBLEMS ONCE YOU ARE AT HOME CALL THE FOLLOWING NUMBERS:   Main office number: [phone]    Your follow up appointment to see either Dr. Art Grier PA-C, or [facility] as scheduled in 2 weeks. If you are unsure of your appointment date call the office at [phone]. Medication Instructions     · Resume your home medictions as directed, you may have directed not to resume supplements until after your follow up. · A prescription for pain medication has been given   · It is important that you take the medication exactly as they are prescribed. · Keep your medication in the bottles provided by the pharmacist and keep a list of the medication names, dosages, and times to be taken in your wallet. · Do not take other medications without consulting your doctor. What to do at [address] [place] [address] Ave your prehospital diet. If you have excessive nausea or vomitting call your doctor's office. Be sure to maintain adequate fluid intake. Some pain medications may cause constipation. Remember to drink fluids, stay as active as possible, and eat plenty of fiber-rich foods. Begin In-Home Physical Therapy; 3 times a week to work on gait training, range of motion, strengthening, and weight bearing exercises as tolerable. Continue to use your walker or cane when walking. May progress from the walker to a cane to complete total bearing as tolerable. Patient may shower. Wrap incision with plastic wrap/covering to prevent incision from getting wet. Avoid complete immersion. YOUR DRESSING SHOULD BE CHANGED BY YOUR HOME HEALTH NURSE 5-7 AFTER SURGERY ACCORDING TO THE DATE WRITTEN ON YOUR DRESSING.           When to Call    - Call if you have a temperature greater then 101  - Unable to keep food down  - Are unable to bear any wieght   - Need a pain medication refill     Information obtained by :  I understand that if any problems occur once I am at home I am to contact my physician. I understand and acknowledge receipt of the instructions indicated above.                                                                                                                                            Physician's or R.N.'s Signature                                                                  Date/Time                                                                                                                                              Patient or Representative Signature                                                          Date/Time There are no Wet Read(s) to document.

## 2023-08-15 ENCOUNTER — HOSPITAL ENCOUNTER (OUTPATIENT)
Facility: HOSPITAL | Age: 78
Discharge: HOME | End: 2023-08-18
Payer: MEDICARE

## 2023-08-15 LAB
ALBUMIN SERPL-MCNC: 3.9 G/DL (ref 3.4–5)
ALBUMIN/GLOB SERPL: 1.3 (ref 0.8–1.7)
ALP SERPL-CCNC: 77 U/L (ref 45–117)
ALT SERPL-CCNC: 27 U/L (ref 16–61)
ANION GAP SERPL CALC-SCNC: 7 MMOL/L (ref 3–18)
AST SERPL-CCNC: 18 U/L (ref 10–38)
BILIRUB SERPL-MCNC: 0.5 MG/DL (ref 0.2–1)
BUN SERPL-MCNC: 15 MG/DL (ref 7–18)
BUN/CREAT SERPL: 12 (ref 12–20)
CALCIUM SERPL-MCNC: 9.3 MG/DL (ref 8.5–10.1)
CHLORIDE SERPL-SCNC: 103 MMOL/L (ref 100–111)
CO2 SERPL-SCNC: 30 MMOL/L (ref 21–32)
CREAT SERPL-MCNC: 1.25 MG/DL (ref 0.6–1.3)
GLOBULIN SER CALC-MCNC: 3.1 G/DL (ref 2–4)
GLUCOSE SERPL-MCNC: 105 MG/DL (ref 74–99)
MAGNESIUM SERPL-MCNC: 2.5 MG/DL (ref 1.6–2.6)
POTASSIUM SERPL-SCNC: 4 MMOL/L (ref 3.5–5.5)
PROT SERPL-MCNC: 7 G/DL (ref 6.4–8.2)
SODIUM SERPL-SCNC: 140 MMOL/L (ref 136–145)
T4 FREE SERPL-MCNC: 1.1 NG/DL (ref 0.7–1.5)
TSH SERPL DL<=0.05 MIU/L-ACNC: 2.71 UIU/ML (ref 0.36–3.74)

## 2023-08-15 PROCEDURE — 84443 ASSAY THYROID STIM HORMONE: CPT

## 2023-08-15 PROCEDURE — 84480 ASSAY TRIIODOTHYRONINE (T3): CPT

## 2023-08-15 PROCEDURE — 80053 COMPREHEN METABOLIC PANEL: CPT

## 2023-08-15 PROCEDURE — 36415 COLL VENOUS BLD VENIPUNCTURE: CPT

## 2023-08-15 PROCEDURE — 83735 ASSAY OF MAGNESIUM: CPT

## 2023-08-15 PROCEDURE — 84439 ASSAY OF FREE THYROXINE: CPT

## 2023-08-16 LAB — T3 SERPL-MCNC: 92 NG/DL (ref 71–180)

## 2023-08-17 LAB
FAX TO NUMBER: NORMAL
TEST RESULTS FAXED TO: NORMAL

## 2024-05-04 ENCOUNTER — APPOINTMENT (OUTPATIENT)
Facility: HOSPITAL | Age: 79
End: 2024-05-04
Payer: MEDICARE

## 2024-05-04 ENCOUNTER — HOSPITAL ENCOUNTER (EMERGENCY)
Facility: HOSPITAL | Age: 79
Discharge: ANOTHER HOSPITAL | End: 2024-05-04
Payer: MEDICARE

## 2024-05-04 VITALS
TEMPERATURE: 97.2 F | RESPIRATION RATE: 24 BRPM | OXYGEN SATURATION: 100 % | HEIGHT: 69 IN | WEIGHT: 230 LBS | SYSTOLIC BLOOD PRESSURE: 133 MMHG | BODY MASS INDEX: 34.07 KG/M2 | HEART RATE: 80 BPM | DIASTOLIC BLOOD PRESSURE: 68 MMHG

## 2024-05-04 DIAGNOSIS — J90 PLEURAL EFFUSION, RIGHT: Primary | ICD-10-CM

## 2024-05-04 DIAGNOSIS — Z79.01 ANTICOAGULATED: ICD-10-CM
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
## 2024-05-04 DIAGNOSIS — J94.2 HEMOTHORAX, RIGHT: ICD-10-CM

## 2024-05-04 LAB
ABO + RH BLD: NORMAL
ALBUMIN SERPL-MCNC: 4.1 G/DL (ref 3.4–5)
ALBUMIN/GLOB SERPL: 1.2 (ref 0.8–1.7)
ALP SERPL-CCNC: 68 U/L (ref 45–117)
ALT SERPL-CCNC: 49 U/L (ref 16–61)
ANION GAP BLD CALC-SCNC: ABNORMAL MMOL/L (ref 10–20)
ANION GAP SERPL CALC-SCNC: 7 MMOL/L (ref 3–18)
APTT PPP: 28.8 SEC (ref 23–36.4)
AST SERPL-CCNC: 47 U/L (ref 10–38)
BASE EXCESS BLD CALC-SCNC: 2.4 MMOL/L
BASOPHILS # BLD: 0.1 K/UL (ref 0–0.1)
BASOPHILS NFR BLD: 1 % (ref 0–2)
BILIRUB SERPL-MCNC: 0.7 MG/DL (ref 0.2–1)
BLOOD GROUP ANTIBODIES SERPL: NORMAL
BUN SERPL-MCNC: 16 MG/DL (ref 7–18)
BUN/CREAT SERPL: 12 (ref 12–20)
CA-I BLD-MCNC: 1.19 MMOL/L (ref 1.12–1.32)
CALCIUM SERPL-MCNC: 9.8 MG/DL (ref 8.5–10.1)
CHLORIDE BLD-SCNC: 104 MMOL/L (ref 98–107)
CHLORIDE SERPL-SCNC: 105 MMOL/L (ref 100–111)
CO2 BLD-SCNC: 26 MMOL/L (ref 19–24)
CO2 SERPL-SCNC: 26 MMOL/L (ref 21–32)
CREAT BLD-MCNC: 1.05 MG/DL (ref 0.6–1.3)
CREAT SERPL-MCNC: 1.32 MG/DL (ref 0.6–1.3)
DIFFERENTIAL METHOD BLD: ABNORMAL
EOSINOPHIL # BLD: 0.1 K/UL (ref 0–0.4)
EOSINOPHIL NFR BLD: 1 % (ref 0–5)
ERYTHROCYTE [DISTWIDTH] IN BLOOD BY AUTOMATED COUNT: 12.3 % (ref 11.6–14.5)
GLOBULIN SER CALC-MCNC: 3.5 G/DL (ref 2–4)
GLUCOSE BLD-MCNC: 110 MG/DL (ref 65–100)
GLUCOSE SERPL-MCNC: 118 MG/DL (ref 74–99)
HCO3 BLD-SCNC: 27 MMOL/L (ref 22–26)
HCT VFR BLD AUTO: 40.6 % (ref 36–48)
HCT VFR BLD AUTO: 45.1 % (ref 36–48)
HGB BLD-MCNC: 14.2 G/DL (ref 13–16)
HGB BLD-MCNC: 15.7 G/DL (ref 13–16)
IMM GRANULOCYTES # BLD AUTO: 0.3 K/UL (ref 0–0.04)
IMM GRANULOCYTES NFR BLD AUTO: 2 % (ref 0–0.5)
INR PPP: 1 (ref 0.9–1.1)
LACTATE BLD-SCNC: 2.19 MMOL/L (ref 0.4–2)
LIPASE SERPL-CCNC: 76 U/L (ref 13–75)
LYMPHOCYTES # BLD: 2.1 K/UL (ref 0.9–3.6)
LYMPHOCYTES NFR BLD: 17 % (ref 21–52)
MCH RBC QN AUTO: 31.9 PG (ref 24–34)
MCHC RBC AUTO-ENTMCNC: 34.8 G/DL (ref 31–37)
MCV RBC AUTO: 91.7 FL (ref 78–100)
MONOCYTES # BLD: 0.7 K/UL (ref 0.05–1.2)
MONOCYTES NFR BLD: 6 % (ref 3–10)
NEUTS SEG # BLD: 9.3 K/UL (ref 1.8–8)
NEUTS SEG NFR BLD: 74 % (ref 40–73)
NRBC # BLD: 0 K/UL (ref 0–0.01)
NRBC BLD-RTO: 0 PER 100 WBC
PCO2 BLDV: 40.8 MMHG (ref 41–51)
PH BLDV: 7.43 (ref 7.32–7.42)
PLATELET # BLD AUTO: 245 K/UL (ref 135–420)
PMV BLD AUTO: 9.9 FL (ref 9.2–11.8)
PO2 BLDV: 36 MMHG (ref 25–40)
POTASSIUM BLD-SCNC: 3.1 MMOL/L (ref 3.5–5.1)
POTASSIUM SERPL-SCNC: 3.1 MMOL/L (ref 3.5–5.5)
PROT SERPL-MCNC: 7.6 G/DL (ref 6.4–8.2)
PROTHROMBIN TIME: 13.4 SEC (ref 11.9–14.7)
RBC # BLD AUTO: 4.92 M/UL (ref 4.35–5.65)
SAO2 % BLD: 70 %
SERVICE CMNT-IMP: ABNORMAL
SODIUM BLD-SCNC: 144 MMOL/L (ref 136–145)
SODIUM SERPL-SCNC: 138 MMOL/L (ref 136–145)
SPECIMEN EXP DATE BLD: NORMAL
SPECIMEN SITE: ABNORMAL
WBC # BLD AUTO: 12.5 K/UL (ref 4.6–13.2)

## 2024-05-04 PROCEDURE — 2580000003 HC RX 258: Performed by: PHYSICIAN ASSISTANT

## 2024-05-04 PROCEDURE — 71260 CT THORAX DX C+: CPT

## 2024-05-04 PROCEDURE — 86901 BLOOD TYPING SEROLOGIC RH(D): CPT

## 2024-05-04 PROCEDURE — 6360000004 HC RX CONTRAST MEDICATION: Performed by: PHYSICIAN ASSISTANT

## 2024-05-04 PROCEDURE — 84295 ASSAY OF SERUM SODIUM: CPT

## 2024-05-04 PROCEDURE — 96375 TX/PRO/DX INJ NEW DRUG ADDON: CPT

## 2024-05-04 PROCEDURE — 82803 BLOOD GASES ANY COMBINATION: CPT

## 2024-05-04 PROCEDURE — 82947 ASSAY GLUCOSE BLOOD QUANT: CPT

## 2024-05-04 PROCEDURE — 85018 HEMOGLOBIN: CPT

## 2024-05-04 PROCEDURE — 86900 BLOOD TYPING SEROLOGIC ABO: CPT

## 2024-05-04 PROCEDURE — 80053 COMPREHEN METABOLIC PANEL: CPT

## 2024-05-04 PROCEDURE — 6360000002 HC RX W HCPCS: Performed by: PHYSICIAN ASSISTANT

## 2024-05-04 PROCEDURE — 85014 HEMATOCRIT: CPT

## 2024-05-04 PROCEDURE — 83605 ASSAY OF LACTIC ACID: CPT

## 2024-05-04 PROCEDURE — 70450 CT HEAD/BRAIN W/O DYE: CPT

## 2024-05-04 PROCEDURE — 86850 RBC ANTIBODY SCREEN: CPT

## 2024-05-04 PROCEDURE — 96365 THER/PROPH/DIAG IV INF INIT: CPT

## 2024-05-04 PROCEDURE — 96376 TX/PRO/DX INJ SAME DRUG ADON: CPT

## 2024-05-04 PROCEDURE — 99285 EMERGENCY DEPT VISIT HI MDM: CPT

## 2024-05-04 PROCEDURE — 96374 THER/PROPH/DIAG INJ IV PUSH: CPT

## 2024-05-04 PROCEDURE — 82330 ASSAY OF CALCIUM: CPT

## 2024-05-04 PROCEDURE — 93005 ELECTROCARDIOGRAM TRACING: CPT | Performed by: PHYSICIAN ASSISTANT

## 2024-05-04 PROCEDURE — 85610 PROTHROMBIN TIME: CPT

## 2024-05-04 PROCEDURE — 84132 ASSAY OF SERUM POTASSIUM: CPT

## 2024-05-04 PROCEDURE — 83690 ASSAY OF LIPASE: CPT

## 2024-05-04 PROCEDURE — 85025 COMPLETE CBC W/AUTO DIFF WBC: CPT

## 2024-05-04 PROCEDURE — 72125 CT NECK SPINE W/O DYE: CPT

## 2024-05-04 PROCEDURE — 85730 THROMBOPLASTIN TIME PARTIAL: CPT

## 2024-05-04 RX ORDER — MORPHINE SULFATE 4 MG/ML
4 INJECTION, SOLUTION INTRAMUSCULAR; INTRAVENOUS
Status: COMPLETED | OUTPATIENT
Start: 2024-05-04 | End: 2024-05-04

## 2024-05-04 RX ORDER — ONDANSETRON 2 MG/ML
4 INJECTION INTRAMUSCULAR; INTRAVENOUS
Status: COMPLETED | OUTPATIENT
Start: 2024-05-04 | End: 2024-05-04

## 2024-05-04 RX ORDER — SODIUM CHLORIDE 9 MG/ML
50 INJECTION, SOLUTION INTRAVENOUS ONCE
Status: COMPLETED | OUTPATIENT
Start: 2024-05-04 | End: 2024-05-04

## 2024-05-04 RX ADMIN — MORPHINE SULFATE 4 MG: 4 INJECTION, SOLUTION INTRAMUSCULAR; INTRAVENOUS at 18:35

## 2024-05-04 RX ADMIN — SODIUM CHLORIDE 50 ML: 9 INJECTION, SOLUTION INTRAVENOUS at 20:37

## 2024-05-04 RX ADMIN — PROTHROMBIN, COAGULATION FACTOR VII HUMAN, COAGULATION FACTOR IX HUMAN, COAGULATION FACTOR X HUMAN, PROTEIN C, PROTEIN S HUMAN, AND WATER 2000 UNITS: KIT at 20:22

## 2024-05-04 RX ADMIN — MORPHINE SULFATE 4 MG: 4 INJECTION, SOLUTION INTRAMUSCULAR; INTRAVENOUS at 21:17

## 2024-05-04 RX ADMIN — MORPHINE SULFATE 4 MG: 4 INJECTION, SOLUTION INTRAMUSCULAR; INTRAVENOUS at 18:56

## 2024-05-04 RX ADMIN — ONDANSETRON 4 MG: 2 INJECTION INTRAMUSCULAR; INTRAVENOUS at 18:36

## 2024-05-04 RX ADMIN — ONDANSETRON 4 MG: 2 INJECTION INTRAMUSCULAR; INTRAVENOUS at 21:16

## 2024-05-04 RX ADMIN — IOPAMIDOL 100 ML: 612 INJECTION, SOLUTION INTRAVENOUS at 19:07

## 2024-05-04 ASSESSMENT — PAIN - FUNCTIONAL ASSESSMENT
PAIN_FUNCTIONAL_ASSESSMENT: 0-10

## 2024-05-04 ASSESSMENT — PAIN DESCRIPTION - PAIN TYPE: TYPE: ACUTE PAIN

## 2024-05-04 ASSESSMENT — PAIN DESCRIPTION - DESCRIPTORS
DESCRIPTORS: ACHING

## 2024-05-04 ASSESSMENT — PAIN DESCRIPTION - ORIENTATION
ORIENTATION: RIGHT;LEFT
ORIENTATION: RIGHT;LEFT;LOWER

## 2024-05-04 ASSESSMENT — PAIN SCALES - GENERAL
PAINLEVEL_OUTOF10: 10

## 2024-05-04 ASSESSMENT — LIFESTYLE VARIABLES
HOW MANY STANDARD DRINKS CONTAINING ALCOHOL DO YOU HAVE ON A TYPICAL DAY: PATIENT DOES NOT DRINK
HOW OFTEN DO YOU HAVE A DRINK CONTAINING ALCOHOL: NEVER

## 2024-05-04 ASSESSMENT — PAIN DESCRIPTION - LOCATION
LOCATION: BACK

## 2024-05-04 NOTE — ED TRIAGE NOTES
Pt had severe horse accident two years ago breaking several bones. Fell off of horse today. Complaining of pain at juncture of spine and ribs. Sts worse pain is lower lumbar and sacral area. Sts he does feel a little short of breath. Pt sts he does not feel like he broke anything. Sts he does not think he lost consciousness. Pt is on xeralto

## 2024-05-04 NOTE — ED PROVIDER NOTES
[facility] EMERGENCY DEPT  EMERGENCY DEPARTMENT ENCOUNTER       Pt Name: Julien Avila  MRN: [id number]  Birthdate 1945  Date of evaluation: 5/4/2024  PCP: Tommie Gillespie MD  Note Started: 12:37 AM 5/4/24     CHIEF COMPLAINT       Chief Complaint   Patient presents with    Fall        HISTORY OF PRESENT ILLNESS: 1 or more elements      History From: Patient  HPI Limitations: None  Chronic Conditions: HTN, HLP, PAfib on xarelto, GERD, OA, previous trauma after fall from horse  Social Determinants affecting Dx or Tx: none      Julien Avila is a 78 y.o. male who presents to ED via private vehicle c/o fall from horse. PTA, pt was bucked off horse onto left side. Pt did not hit head but notes pain to thoracic and lumbar back. Pt has hx of PAFib on Xarelto. Last dose of Xarelto was this morning. No headache, neck pain, numbness, nausea, vomiting     Nursing Notes were all reviewed and agreed with or any disagreements were addressed in the HPI.    PAST HISTORY     Past Medical History:  Past Medical History:   Diagnosis Date    Acid reflux     Adverse effect of anesthesia     hangover from anes    Arthritis     Atrial fibrillation (HCC)     Paroxysmal    Cancer (HCC)     skin    Chest pain 2/9/2011    DVT of lower extremity (deep venous thrombosis) (HCC)     Right, after horse stepped on me    Hyperlipidemia     Hypertension     Hypopotassemia 6/6/2013    IBS (irritable bowel syndrome)     constipation    Long term (current) use of anticoagulants     LVH (left ventricular hypertrophy)     Near syncope 3/30/2014    Osteoarthritis of right hip 2/15/2021    Other and unspecified hyperlipidemia 1/28/2014    Psychiatric disorder     anxiety    Sleep apnea     advised to bring CPAP day of surgery    SOB (shortness of breath) 2/10/2011       Past Surgical History:  Past Surgical History:   Procedure Laterality Date    CATARACT REMOVAL Bilateral 4-28-15    HERNIA REPAIR      Inguinal and umbilical    KNEE ARTHROSCOPY Right      PROGRESS NOTE:  8:15 PM  The patient presents with fall injury pain after falling from a horse.  Patient is on Xarelto.  He is a previous falls injuries resulting in significant chest fracture multiple rib fractures on the left.  There is no loss of consciousness arm or leg weakness or paralysis.  My exam shows uncomfortable appearing nontoxic.  There is no significant extensive ecchymoses.  There is tenderness to palpation along the right thoracic spine.  There is no crepitus.  Imp/plan: Patient with fall on Xarelto\" going trauma scanning CT chest abdomen pelvis as well as head and C-spine.  These studies came back unremarkable.  His INR is 1.0, however patient getting Kcentra at this point.  Case further reviewed with trauma center will be transferred to nearby trauma facility.   I personally saw and performed a substantive portion of the visit including the medical decision making and have personally seen and examined the patient, reviewed the SILVIA's note and agree with findings and plan.  Written by Dell Bailey MD   [EDGARDO]      ED Course User Index  [EDGARDO] Dell Bailey MD       Medial Decision Making:  DDX: pt came by private vehicle after fall from horse just PTA. Pt is on xarelto for PAFib. Pain to mostly thoracic back but trauma scan ordered. POC Cr normal. CT tech notified at 23 minute daniel after arrival. Pt still in santiago hour. CT tech will notify radiologist of urgency of emergent reads.     I escorted patient to CT with RN at 1850. Pt was given additional morphine due to pain. CT HEAD and C spine appear unremarkable to me but radiology looking at scans now. It does appear the right lung space has fluid (?blood) on my read. Kcentra was ordered from pharmacy STAT. Discussed with pharmacist. Reviewed scan with attending (Dr. Aburto). Dr. Bailey is new attending on staff. He is aware of case and findings so far.     Kcentra being sent from pharmacy. Pt has moderate right pleural effusion that

## 2024-05-05 LAB
EKG ATRIAL RATE: 80 BPM
EKG DIAGNOSIS: NORMAL
EKG P AXIS: 53 DEGREES
EKG P-R INTERVAL: 190 MS
EKG Q-T INTERVAL: 470 MS
EKG QRS DURATION: 98 MS
EKG QTC CALCULATION (BAZETT): 543 MS
EKG R AXIS: 19 DEGREES
EKG T AXIS: 27 DEGREES
EKG VENTRICULAR RATE: 80 BPM

## 2024-05-05 NOTE — ED NOTES
Pt returned from CT transported by triage nurse and other staff.   This RN enters room, pt c/o pain along both sides of his spine \"it feels like my diaphragm is hurt on the R side.\"  Pt reports he was riding a horse when he was thrown off, denies hitting head or LOC. Pt's pupils equal and reactive.   Pt alert + oriented speaking in short phrases c/o pain.   Pt has clear lung sounds, slightly diminished to LLQ.   Pt was examined on arrival by triage nurse and PA.   This RN assessment noted no obvious deformities or discolorations to any anterior portion of body.   Pt has + PMS in all extremities, limited movement due to pain.     Pt on 3 lpm 02 via nasal cannula. 02 saturation noted to be at 87%.   PT placed on non re breather at 15 lpm. Provider aware.   Pt's 02 saturation increased to 98%.

## 2024-05-05 NOTE — ED NOTES
Pt remains alert and oriented in bed supine position. Pt's significant other at bedside. Pt remains on non re breather at 15 lpm.   Pt remains alert oriented and talking. Pt does c/o pain.

## 2024-05-12 ENCOUNTER — HOSPITAL ENCOUNTER (INPATIENT)
Facility: HOSPITAL | Age: 79
LOS: 1 days | Discharge: HOME HEALTH | DRG: 199 | End: 2024-05-13
Attending: EMERGENCY MEDICINE | Admitting: INTERNAL MEDICINE
Payer: MEDICARE

## 2024-05-12 ENCOUNTER — APPOINTMENT (OUTPATIENT)
Facility: HOSPITAL | Age: 79
DRG: 199 | End: 2024-05-12
Payer: MEDICARE

## 2024-05-12 DIAGNOSIS — R07.9 CHEST PAIN, UNSPECIFIED TYPE: ICD-10-CM

## 2024-05-12 DIAGNOSIS — I42.9 CARDIOMYOPATHY, UNSPECIFIED TYPE (HCC): ICD-10-CM

## 2024-05-12 DIAGNOSIS — J96.01 ACUTE RESPIRATORY FAILURE WITH HYPOXIA (HCC): ICD-10-CM

## 2024-05-12 DIAGNOSIS — J90 PLEURAL EFFUSION ON RIGHT: Primary | ICD-10-CM

## 2024-05-12 LAB
ALBUMIN SERPL-MCNC: 3.3 G/DL (ref 3.4–5)
ALBUMIN/GLOB SERPL: 1.1 (ref 0.8–1.7)
ALP SERPL-CCNC: 88 U/L (ref 45–117)
ALT SERPL-CCNC: 24 U/L (ref 16–61)
ANION GAP SERPL CALC-SCNC: 7 MMOL/L (ref 3–18)
ARTERIAL PATENCY WRIST A: POSITIVE
AST SERPL-CCNC: 27 U/L (ref 10–38)
BASE EXCESS BLD CALC-SCNC: 2.8 MMOL/L
BASOPHILS # BLD: 0.1 K/UL (ref 0–0.1)
BASOPHILS NFR BLD: 1 % (ref 0–2)
BDY SITE: ABNORMAL
BILIRUB SERPL-MCNC: 1 MG/DL (ref 0.2–1)
BUN SERPL-MCNC: 22 MG/DL (ref 7–18)
BUN/CREAT SERPL: 16 (ref 12–20)
CALCIUM SERPL-MCNC: 8.6 MG/DL (ref 8.5–10.1)
CHLORIDE SERPL-SCNC: 101 MMOL/L (ref 100–111)
CO2 SERPL-SCNC: 28 MMOL/L (ref 21–32)
CREAT SERPL-MCNC: 1.35 MG/DL (ref 0.6–1.3)
DIFFERENTIAL METHOD BLD: ABNORMAL
EOSINOPHIL # BLD: 0.1 K/UL (ref 0–0.4)
EOSINOPHIL NFR BLD: 1 % (ref 0–5)
ERYTHROCYTE [DISTWIDTH] IN BLOOD BY AUTOMATED COUNT: 12.8 % (ref 11.6–14.5)
FLUAV RNA SPEC QL NAA+PROBE: NOT DETECTED
FLUBV RNA SPEC QL NAA+PROBE: NOT DETECTED
GAS FLOW.O2 O2 DELIVERY SYS: ABNORMAL
GLOBULIN SER CALC-MCNC: 2.9 G/DL (ref 2–4)
GLUCOSE SERPL-MCNC: 172 MG/DL (ref 74–99)
HCO3 BLD-SCNC: 27.3 MMOL/L (ref 22–26)
HCT VFR BLD AUTO: 40 % (ref 36–48)
HGB BLD-MCNC: 13.4 G/DL (ref 13–16)
IMM GRANULOCYTES # BLD AUTO: 0.1 K/UL (ref 0–0.04)
IMM GRANULOCYTES NFR BLD AUTO: 1 % (ref 0–0.5)
LYMPHOCYTES # BLD: 1.1 K/UL (ref 0.9–3.6)
LYMPHOCYTES NFR BLD: 8 % (ref 21–52)
MCH RBC QN AUTO: 31.4 PG (ref 24–34)
MCHC RBC AUTO-ENTMCNC: 33.5 G/DL (ref 31–37)
MCV RBC AUTO: 93.7 FL (ref 78–100)
MONOCYTES # BLD: 1.2 K/UL (ref 0.05–1.2)
MONOCYTES NFR BLD: 9 % (ref 3–10)
NEUTS SEG # BLD: 10.2 K/UL (ref 1.8–8)
NEUTS SEG NFR BLD: 80 % (ref 40–73)
NRBC # BLD: 0 K/UL (ref 0–0.01)
NRBC BLD-RTO: 0 PER 100 WBC
NT PRO BNP: 67 PG/ML (ref 0–1800)
O2/TOTAL GAS SETTING VFR VENT: 100 %
PCO2 BLD: 40.3 MMHG (ref 35–45)
PH BLD: 7.44 (ref 7.35–7.45)
PLATELET # BLD AUTO: 285 K/UL (ref 135–420)
PMV BLD AUTO: 9.3 FL (ref 9.2–11.8)
PO2 BLD: 81 MMHG (ref 80–100)
POTASSIUM SERPL-SCNC: 3.9 MMOL/L (ref 3.5–5.5)
PROT SERPL-MCNC: 6.2 G/DL (ref 6.4–8.2)
RBC # BLD AUTO: 4.27 M/UL (ref 4.35–5.65)
SAO2 % BLD: 96.3 % (ref 92–97)
SARS-COV-2 RNA RESP QL NAA+PROBE: NOT DETECTED
SERVICE CMNT-IMP: ABNORMAL
SODIUM SERPL-SCNC: 136 MMOL/L (ref 136–145)
SPECIMEN TYPE: ABNORMAL
TROPONIN I SERPL HS-MCNC: 6 NG/L (ref 0–78)
WBC # BLD AUTO: 12.7 K/UL (ref 4.6–13.2)

## 2024-05-12 PROCEDURE — 71275 CT ANGIOGRAPHY CHEST: CPT

## 2024-05-12 PROCEDURE — 71045 X-RAY EXAM CHEST 1 VIEW: CPT

## 2024-05-12 PROCEDURE — 85025 COMPLETE CBC W/AUTO DIFF WBC: CPT

## 2024-05-12 PROCEDURE — 87636 SARSCOV2 & INF A&B AMP PRB: CPT

## 2024-05-12 PROCEDURE — 93005 ELECTROCARDIOGRAM TRACING: CPT | Performed by: EMERGENCY MEDICINE

## 2024-05-12 PROCEDURE — 82803 BLOOD GASES ANY COMBINATION: CPT

## 2024-05-12 PROCEDURE — 84484 ASSAY OF TROPONIN QUANT: CPT

## 2024-05-12 PROCEDURE — 93005 ELECTROCARDIOGRAM TRACING: CPT | Performed by: INTERNAL MEDICINE

## 2024-05-12 PROCEDURE — 80053 COMPREHEN METABOLIC PANEL: CPT

## 2024-05-12 PROCEDURE — 99285 EMERGENCY DEPT VISIT HI MDM: CPT

## 2024-05-12 PROCEDURE — 85610 PROTHROMBIN TIME: CPT

## 2024-05-12 PROCEDURE — 83880 ASSAY OF NATRIURETIC PEPTIDE: CPT

## 2024-05-12 PROCEDURE — 6360000004 HC RX CONTRAST MEDICATION: Performed by: EMERGENCY MEDICINE

## 2024-05-12 PROCEDURE — 36600 WITHDRAWAL OF ARTERIAL BLOOD: CPT

## 2024-05-12 RX ORDER — SODIUM CHLORIDE 0.9 % (FLUSH) 0.9 %
5-40 SYRINGE (ML) INJECTION PRN
Status: DISCONTINUED | OUTPATIENT
Start: 2024-05-12 | End: 2024-05-13 | Stop reason: SDUPTHER

## 2024-05-12 RX ORDER — SODIUM CHLORIDE 9 MG/ML
INJECTION, SOLUTION INTRAVENOUS PRN
Status: DISCONTINUED | OUTPATIENT
Start: 2024-05-12 | End: 2024-05-13 | Stop reason: HOSPADM

## 2024-05-12 RX ORDER — SODIUM CHLORIDE 0.9 % (FLUSH) 0.9 %
5-40 SYRINGE (ML) INJECTION EVERY 12 HOURS SCHEDULED
Status: DISCONTINUED | OUTPATIENT
Start: 2024-05-13 | End: 2024-05-13 | Stop reason: HOSPADM

## 2024-05-12 RX ADMIN — IOPAMIDOL 75 ML: 755 INJECTION, SOLUTION INTRAVENOUS at 22:26

## 2024-05-12 ASSESSMENT — PAIN - FUNCTIONAL ASSESSMENT: PAIN_FUNCTIONAL_ASSESSMENT: 0-10

## 2024-05-12 ASSESSMENT — PAIN SCALES - GENERAL: PAINLEVEL_OUTOF10: 3

## 2024-05-13 ENCOUNTER — APPOINTMENT (OUTPATIENT)
Facility: HOSPITAL | Age: 79
DRG: 199 | End: 2024-05-13
Payer: MEDICARE

## 2024-05-13 ENCOUNTER — HOSPITAL ENCOUNTER (INPATIENT)
Facility: HOSPITAL | Age: 79
LOS: 7 days | Discharge: NURSING, REHAB OR LONG-TERM CARE | DRG: 199 | End: 2024-05-20
Attending: STUDENT IN AN ORGANIZED HEALTH CARE EDUCATION/TRAINING PROGRAM | Admitting: STUDENT IN AN ORGANIZED HEALTH CARE EDUCATION/TRAINING PROGRAM
Payer: MEDICARE

## 2024-05-13 ENCOUNTER — APPOINTMENT (OUTPATIENT)
Facility: HOSPITAL | Age: 79
DRG: 199 | End: 2024-05-13
Attending: INTERNAL MEDICINE
Payer: MEDICARE

## 2024-05-13 VITALS
BODY MASS INDEX: 38.8 KG/M2 | WEIGHT: 262 LBS | DIASTOLIC BLOOD PRESSURE: 79 MMHG | TEMPERATURE: 97.7 F | OXYGEN SATURATION: 95 % | HEIGHT: 69 IN | RESPIRATION RATE: 23 BRPM | SYSTOLIC BLOOD PRESSURE: 141 MMHG | HEART RATE: 82 BPM

## 2024-05-13 DIAGNOSIS — J94.2 HEMOTHORAX ON RIGHT: Primary | ICD-10-CM

## 2024-05-13 PROBLEM — G47.33 OBSTRUCTIVE SLEEP APNEA: Status: ACTIVE | Noted: 2024-05-13

## 2024-05-13 PROBLEM — S22.009A CLOSED FRACTURE OF THORACIC VERTEBRA (HCC): Status: ACTIVE | Noted: 2024-05-13

## 2024-05-13 PROBLEM — F32.A ANXIETY AND DEPRESSION: Status: ACTIVE | Noted: 2024-05-13

## 2024-05-13 PROBLEM — J90 PLEURAL EFFUSION: Status: ACTIVE | Noted: 2024-05-13

## 2024-05-13 PROBLEM — J96.01 ACUTE RESPIRATORY FAILURE WITH HYPOXIA (HCC): Status: ACTIVE | Noted: 2024-05-13

## 2024-05-13 PROBLEM — D64.9 NORMOCYTIC ANEMIA: Status: ACTIVE | Noted: 2024-05-13

## 2024-05-13 PROBLEM — S22.42XA CLOSED FRACTURE OF MULTIPLE RIBS OF LEFT SIDE: Status: ACTIVE | Noted: 2024-05-13

## 2024-05-13 PROBLEM — F41.9 ANXIETY AND DEPRESSION: Status: ACTIVE | Noted: 2024-05-13

## 2024-05-13 LAB
ALBUMIN SERPL-MCNC: 3.1 G/DL (ref 3.4–5)
ALBUMIN/GLOB SERPL: 1 (ref 0.8–1.7)
ALP SERPL-CCNC: 77 U/L (ref 45–117)
ALT SERPL-CCNC: 21 U/L (ref 16–61)
ANION GAP SERPL CALC-SCNC: 6 MMOL/L (ref 3–18)
ARTERIAL PATENCY WRIST A: POSITIVE
ARTERIAL PATENCY WRIST A: POSITIVE
AST SERPL-CCNC: 19 U/L (ref 10–38)
BASE EXCESS BLD CALC-SCNC: 3.7 MMOL/L
BASE EXCESS BLD CALC-SCNC: 3.8 MMOL/L
BDY SITE: ABNORMAL
BDY SITE: ABNORMAL
BILIRUB SERPL-MCNC: 1.2 MG/DL (ref 0.2–1)
BODY TEMPERATURE: 98
BUN SERPL-MCNC: 24 MG/DL (ref 7–18)
BUN/CREAT SERPL: 20 (ref 12–20)
CALCIUM SERPL-MCNC: 8.6 MG/DL (ref 8.5–10.1)
CHLORIDE SERPL-SCNC: 102 MMOL/L (ref 100–111)
CO2 SERPL-SCNC: 27 MMOL/L (ref 21–32)
CREAT SERPL-MCNC: 1.23 MG/DL (ref 0.6–1.3)
ECHO AO ROOT DIAM: 2.9 CM
ECHO AO ROOT INDEX: 1.25 CM/M2
ECHO AV MEAN GRADIENT: 3 MMHG
ECHO AV MEAN VELOCITY: 0.8 M/S
ECHO AV PEAK GRADIENT: 5 MMHG
ECHO AV PEAK VELOCITY: 1.2 M/S
ECHO AV VTI: 13.9 CM
ECHO BSA: 2.41 M2
ECHO LA DIAMETER INDEX: 1.25 CM/M2
ECHO LA DIAMETER: 2.9 CM
ECHO LA TO AORTIC ROOT RATIO: 1
ECHO LV E' LATERAL VELOCITY: 6 CM/S
ECHO LV E' SEPTAL VELOCITY: 10 CM/S
ECHO LV FRACTIONAL SHORTENING: 49 % (ref 28–44)
ECHO LV INTERNAL DIMENSION DIASTOLE INDEX: 1.51 CM/M2
ECHO LV INTERNAL DIMENSION DIASTOLIC: 3.5 CM (ref 4.2–5.9)
ECHO LV INTERNAL DIMENSION SYSTOLIC INDEX: 0.78 CM/M2
ECHO LV INTERNAL DIMENSION SYSTOLIC: 1.8 CM
ECHO LV IVSD: 1.3 CM (ref 0.6–1)
ECHO LV MASS 2D: 153.8 G (ref 88–224)
ECHO LV MASS INDEX 2D: 66.3 G/M2 (ref 49–115)
ECHO LV POSTERIOR WALL DIASTOLIC: 1.3 CM (ref 0.6–1)
ECHO LV RELATIVE WALL THICKNESS RATIO: 0.74
ECHO LVOT AREA: 3.1 CM2
ECHO LVOT DIAM: 2 CM
ECHO RV FREE WALL PEAK S': 14 CM/S
ECHO RV INTERNAL DIMENSION: 3.3 CM
ECHO RV TAPSE: 2.5 CM (ref 1.7–?)
EKG ATRIAL RATE: 101 BPM
EKG ATRIAL RATE: 83 BPM
EKG ATRIAL RATE: 84 BPM
EKG DIAGNOSIS: NORMAL
EKG P AXIS: 17 DEGREES
EKG P AXIS: 27 DEGREES
EKG P-R INTERVAL: 164 MS
EKG P-R INTERVAL: 170 MS
EKG Q-T INTERVAL: 414 MS
EKG Q-T INTERVAL: 418 MS
EKG Q-T INTERVAL: 432 MS
EKG QRS DURATION: 100 MS
EKG QRS DURATION: 90 MS
EKG QRS DURATION: 96 MS
EKG QTC CALCULATION (BAZETT): 493 MS
EKG QTC CALCULATION (BAZETT): 507 MS
EKG QTC CALCULATION (BAZETT): 557 MS
EKG R AXIS: 15 DEGREES
EKG R AXIS: 18 DEGREES
EKG R AXIS: 54 DEGREES
EKG T AXIS: 42 DEGREES
EKG T AXIS: 45 DEGREES
EKG T AXIS: 88 DEGREES
EKG VENTRICULAR RATE: 109 BPM
EKG VENTRICULAR RATE: 83 BPM
EKG VENTRICULAR RATE: 84 BPM
ERYTHROCYTE [DISTWIDTH] IN BLOOD BY AUTOMATED COUNT: 12.8 % (ref 11.6–14.5)
GAS FLOW.O2 O2 DELIVERY SYS: ABNORMAL
GAS FLOW.O2 O2 DELIVERY SYS: ABNORMAL
GLOBULIN SER CALC-MCNC: 3.2 G/DL (ref 2–4)
GLUCOSE SERPL-MCNC: 176 MG/DL (ref 74–99)
HCO3 BLD-SCNC: 27.7 MMOL/L (ref 22–26)
HCO3 BLD-SCNC: 28.9 MMOL/L (ref 22–26)
HCT VFR BLD AUTO: 38.1 % (ref 36–48)
HGB BLD-MCNC: 12.6 G/DL (ref 13–16)
INR PPP: 1.4 (ref 0.9–1.1)
INR PPP: 1.6 (ref 0.9–1.1)
MAGNESIUM SERPL-MCNC: 2.1 MG/DL (ref 1.6–2.6)
MCH RBC QN AUTO: 31.4 PG (ref 24–34)
MCHC RBC AUTO-ENTMCNC: 33.1 G/DL (ref 31–37)
MCV RBC AUTO: 95 FL (ref 78–100)
NRBC # BLD: 0.02 K/UL (ref 0–0.01)
NRBC BLD-RTO: 0.2 PER 100 WBC
O2/TOTAL GAS SETTING VFR VENT: 10 %
O2/TOTAL GAS SETTING VFR VENT: 15 %
PCO2 BLD: 38.1 MMHG (ref 35–45)
PCO2 BLD: 44.6 MMHG (ref 35–45)
PH BLD: 7.42 (ref 7.35–7.45)
PH BLD: 7.47 (ref 7.35–7.45)
PHOSPHATE SERPL-MCNC: 3.1 MG/DL (ref 2.5–4.9)
PLATELET # BLD AUTO: 250 K/UL (ref 135–420)
PMV BLD AUTO: 9.8 FL (ref 9.2–11.8)
PO2 BLD: 60 MMHG (ref 80–100)
PO2 BLD: 77 MMHG (ref 80–100)
POTASSIUM SERPL-SCNC: 3.6 MMOL/L (ref 3.5–5.5)
PROCALCITONIN SERPL-MCNC: 0.19 NG/ML
PROT SERPL-MCNC: 6.3 G/DL (ref 6.4–8.2)
PROTHROMBIN TIME: 17.4 SEC (ref 11.9–14.7)
PROTHROMBIN TIME: 19.1 SEC (ref 11.9–14.7)
RBC # BLD AUTO: 4.01 M/UL (ref 4.35–5.65)
SAO2 % BLD: 92.3 % (ref 92–97)
SAO2 % BLD: 95.3 % (ref 92–97)
SERVICE CMNT-IMP: ABNORMAL
SERVICE CMNT-IMP: ABNORMAL
SODIUM SERPL-SCNC: 135 MMOL/L (ref 136–145)
SPECIMEN TYPE: ABNORMAL
SPECIMEN TYPE: ABNORMAL
TROPONIN I SERPL HS-MCNC: 9 NG/L (ref 0–78)
WBC # BLD AUTO: 13 K/UL (ref 4.6–13.2)

## 2024-05-13 PROCEDURE — 94640 AIRWAY INHALATION TREATMENT: CPT

## 2024-05-13 PROCEDURE — 2580000003 HC RX 258: Performed by: EMERGENCY MEDICINE

## 2024-05-13 PROCEDURE — 2580000003 HC RX 258: Performed by: INTERNAL MEDICINE

## 2024-05-13 PROCEDURE — 2700000000 HC OXYGEN THERAPY PER DAY

## 2024-05-13 PROCEDURE — 93010 ELECTROCARDIOGRAM REPORT: CPT | Performed by: INTERNAL MEDICINE

## 2024-05-13 PROCEDURE — 6360000002 HC RX W HCPCS: Performed by: INTERNAL MEDICINE

## 2024-05-13 PROCEDURE — 2000000000 HC ICU R&B

## 2024-05-13 PROCEDURE — A4216 STERILE WATER/SALINE, 10 ML: HCPCS | Performed by: INTERNAL MEDICINE

## 2024-05-13 PROCEDURE — 84484 ASSAY OF TROPONIN QUANT: CPT

## 2024-05-13 PROCEDURE — 84145 PROCALCITONIN (PCT): CPT

## 2024-05-13 PROCEDURE — 99223 1ST HOSP IP/OBS HIGH 75: CPT | Performed by: STUDENT IN AN ORGANIZED HEALTH CARE EDUCATION/TRAINING PROGRAM

## 2024-05-13 PROCEDURE — 82803 BLOOD GASES ANY COMBINATION: CPT

## 2024-05-13 PROCEDURE — 6360000004 HC RX CONTRAST MEDICATION: Performed by: INTERNAL MEDICINE

## 2024-05-13 PROCEDURE — 6370000000 HC RX 637 (ALT 250 FOR IP): Performed by: INTERNAL MEDICINE

## 2024-05-13 PROCEDURE — 85610 PROTHROMBIN TIME: CPT

## 2024-05-13 PROCEDURE — 2580000003 HC RX 258: Performed by: STUDENT IN AN ORGANIZED HEALTH CARE EDUCATION/TRAINING PROGRAM

## 2024-05-13 PROCEDURE — 36600 WITHDRAWAL OF ARTERIAL BLOOD: CPT

## 2024-05-13 PROCEDURE — 84100 ASSAY OF PHOSPHORUS: CPT

## 2024-05-13 PROCEDURE — 85027 COMPLETE CBC AUTOMATED: CPT

## 2024-05-13 PROCEDURE — 93306 TTE W/DOPPLER COMPLETE: CPT | Performed by: INTERNAL MEDICINE

## 2024-05-13 PROCEDURE — 93005 ELECTROCARDIOGRAM TRACING: CPT | Performed by: INTERNAL MEDICINE

## 2024-05-13 PROCEDURE — 80053 COMPREHEN METABOLIC PANEL: CPT

## 2024-05-13 PROCEDURE — 83735 ASSAY OF MAGNESIUM: CPT

## 2024-05-13 PROCEDURE — C9113 INJ PANTOPRAZOLE SODIUM, VIA: HCPCS | Performed by: INTERNAL MEDICINE

## 2024-05-13 PROCEDURE — C8929 TTE W OR WO FOL WCON,DOPPLER: HCPCS

## 2024-05-13 PROCEDURE — 99223 1ST HOSP IP/OBS HIGH 75: CPT | Performed by: INTERNAL MEDICINE

## 2024-05-13 PROCEDURE — 2140000001 HC CVICU INTERMEDIATE R&B

## 2024-05-13 RX ORDER — POTASSIUM CHLORIDE 7.45 MG/ML
10 INJECTION INTRAVENOUS PRN
Status: DISCONTINUED | OUTPATIENT
Start: 2024-05-13 | End: 2024-05-20 | Stop reason: HOSPADM

## 2024-05-13 RX ORDER — HYDROCHLOROTHIAZIDE 25 MG/1
25 TABLET ORAL DAILY
Status: DISCONTINUED | OUTPATIENT
Start: 2024-05-14 | End: 2024-05-14

## 2024-05-13 RX ORDER — GAUZE BANDAGE 2" X 2"
100 BANDAGE TOPICAL DAILY
Status: DISCONTINUED | OUTPATIENT
Start: 2024-05-13 | End: 2024-05-13 | Stop reason: HOSPADM

## 2024-05-13 RX ORDER — AMIODARONE HYDROCHLORIDE 200 MG/1
200 TABLET ORAL DAILY
Status: DISCONTINUED | OUTPATIENT
Start: 2024-05-13 | End: 2024-05-13

## 2024-05-13 RX ORDER — DULOXETIN HYDROCHLORIDE 30 MG/1
60 CAPSULE, DELAYED RELEASE ORAL DAILY
Status: DISCONTINUED | OUTPATIENT
Start: 2024-05-13 | End: 2024-05-13 | Stop reason: HOSPADM

## 2024-05-13 RX ORDER — GAUZE BANDAGE 2" X 2"
100 BANDAGE TOPICAL DAILY
Status: DISCONTINUED | OUTPATIENT
Start: 2024-05-14 | End: 2024-05-20 | Stop reason: HOSPADM

## 2024-05-13 RX ORDER — M-VIT,TX,IRON,MINS/CALC/FOLIC 27MG-0.4MG
1 TABLET ORAL DAILY
Qty: 10 TABLET | Refills: 0 | Status: ON HOLD
Start: 2024-05-14

## 2024-05-13 RX ORDER — ONDANSETRON 2 MG/ML
4 INJECTION INTRAMUSCULAR; INTRAVENOUS EVERY 6 HOURS PRN
Status: DISCONTINUED | OUTPATIENT
Start: 2024-05-13 | End: 2024-05-13 | Stop reason: HOSPADM

## 2024-05-13 RX ORDER — SENNA AND DOCUSATE SODIUM 50; 8.6 MG/1; MG/1
1 TABLET, FILM COATED ORAL NIGHTLY
Status: DISCONTINUED | OUTPATIENT
Start: 2024-05-13 | End: 2024-05-13 | Stop reason: HOSPADM

## 2024-05-13 RX ORDER — SODIUM CHLORIDE 0.9 % (FLUSH) 0.9 %
5-40 SYRINGE (ML) INJECTION PRN
Status: DISCONTINUED | OUTPATIENT
Start: 2024-05-13 | End: 2024-05-13 | Stop reason: HOSPADM

## 2024-05-13 RX ORDER — ACETAMINOPHEN 325 MG/1
650 TABLET ORAL EVERY 6 HOURS PRN
Status: DISCONTINUED | OUTPATIENT
Start: 2024-05-13 | End: 2024-05-20 | Stop reason: HOSPADM

## 2024-05-13 RX ORDER — VITAMIN B COMPLEX
1000 TABLET ORAL DAILY
Status: DISCONTINUED | OUTPATIENT
Start: 2024-05-14 | End: 2024-05-13

## 2024-05-13 RX ORDER — POTASSIUM CHLORIDE 7.45 MG/ML
10 INJECTION INTRAVENOUS PRN
Status: DISCONTINUED | OUTPATIENT
Start: 2024-05-13 | End: 2024-05-13 | Stop reason: HOSPADM

## 2024-05-13 RX ORDER — ACETAMINOPHEN 650 MG/1
650 SUPPOSITORY RECTAL EVERY 6 HOURS PRN
Status: DISCONTINUED | OUTPATIENT
Start: 2024-05-13 | End: 2024-05-20 | Stop reason: HOSPADM

## 2024-05-13 RX ORDER — AMIODARONE HYDROCHLORIDE 200 MG/1
200 TABLET ORAL DAILY
Status: DISCONTINUED | OUTPATIENT
Start: 2024-05-13 | End: 2024-05-13 | Stop reason: HOSPADM

## 2024-05-13 RX ORDER — MAGNESIUM SULFATE IN WATER 40 MG/ML
2000 INJECTION, SOLUTION INTRAVENOUS PRN
Status: DISCONTINUED | OUTPATIENT
Start: 2024-05-13 | End: 2024-05-20 | Stop reason: HOSPADM

## 2024-05-13 RX ORDER — SODIUM CHLORIDE 9 MG/ML
INJECTION, SOLUTION INTRAVENOUS PRN
Status: DISCONTINUED | OUTPATIENT
Start: 2024-05-13 | End: 2024-05-13 | Stop reason: HOSPADM

## 2024-05-13 RX ORDER — VITAMIN B COMPLEX
1000 TABLET ORAL DAILY
Status: DISCONTINUED | OUTPATIENT
Start: 2024-05-13 | End: 2024-05-13 | Stop reason: HOSPADM

## 2024-05-13 RX ORDER — OXYCODONE HYDROCHLORIDE AND ACETAMINOPHEN 5; 325 MG/1; MG/1
2 TABLET ORAL ONCE
Status: COMPLETED | OUTPATIENT
Start: 2024-05-13 | End: 2024-05-13

## 2024-05-13 RX ORDER — HYDROCHLOROTHIAZIDE 25 MG/1
25 TABLET ORAL DAILY
Status: DISCONTINUED | OUTPATIENT
Start: 2024-05-13 | End: 2024-05-13 | Stop reason: HOSPADM

## 2024-05-13 RX ORDER — ONDANSETRON 4 MG/1
4 TABLET, ORALLY DISINTEGRATING ORAL EVERY 8 HOURS PRN
Status: DISCONTINUED | OUTPATIENT
Start: 2024-05-13 | End: 2024-05-20 | Stop reason: HOSPADM

## 2024-05-13 RX ORDER — POLYETHYLENE GLYCOL 3350 17 G/17G
17 POWDER, FOR SOLUTION ORAL DAILY PRN
Status: DISCONTINUED | OUTPATIENT
Start: 2024-05-13 | End: 2024-05-13 | Stop reason: HOSPADM

## 2024-05-13 RX ORDER — SODIUM CHLORIDE 0.9 % (FLUSH) 0.9 %
5-40 SYRINGE (ML) INJECTION PRN
Status: DISCONTINUED | OUTPATIENT
Start: 2024-05-13 | End: 2024-05-20 | Stop reason: HOSPADM

## 2024-05-13 RX ORDER — ONDANSETRON 4 MG/1
4 TABLET, ORALLY DISINTEGRATING ORAL EVERY 8 HOURS PRN
Status: DISCONTINUED | OUTPATIENT
Start: 2024-05-13 | End: 2024-05-13 | Stop reason: HOSPADM

## 2024-05-13 RX ORDER — PRAVASTATIN SODIUM 20 MG
40 TABLET ORAL NIGHTLY
Status: DISCONTINUED | OUTPATIENT
Start: 2024-05-13 | End: 2024-05-13 | Stop reason: HOSPADM

## 2024-05-13 RX ORDER — AMIODARONE HYDROCHLORIDE 200 MG/1
200 TABLET ORAL DAILY
Status: ON HOLD | COMMUNITY
End: 2024-05-20 | Stop reason: HOSPADM

## 2024-05-13 RX ORDER — SODIUM CHLORIDE 9 MG/ML
INJECTION, SOLUTION INTRAVENOUS PRN
Status: DISCONTINUED | OUTPATIENT
Start: 2024-05-13 | End: 2024-05-20 | Stop reason: HOSPADM

## 2024-05-13 RX ORDER — DULOXETIN HYDROCHLORIDE 60 MG/1
60 CAPSULE, DELAYED RELEASE ORAL DAILY
Status: DISCONTINUED | OUTPATIENT
Start: 2024-05-14 | End: 2024-05-20 | Stop reason: HOSPADM

## 2024-05-13 RX ORDER — ACETAMINOPHEN 650 MG/1
650 SUPPOSITORY RECTAL EVERY 6 HOURS PRN
Status: DISCONTINUED | OUTPATIENT
Start: 2024-05-13 | End: 2024-05-13 | Stop reason: HOSPADM

## 2024-05-13 RX ORDER — POTASSIUM CHLORIDE 20 MEQ/1
40 TABLET, EXTENDED RELEASE ORAL PRN
Status: DISCONTINUED | OUTPATIENT
Start: 2024-05-13 | End: 2024-05-20 | Stop reason: HOSPADM

## 2024-05-13 RX ORDER — MAGNESIUM SULFATE IN WATER 40 MG/ML
2000 INJECTION, SOLUTION INTRAVENOUS PRN
Status: DISCONTINUED | OUTPATIENT
Start: 2024-05-13 | End: 2024-05-13 | Stop reason: HOSPADM

## 2024-05-13 RX ORDER — FUROSEMIDE 10 MG/ML
40 INJECTION INTRAMUSCULAR; INTRAVENOUS ONCE
Status: COMPLETED | OUTPATIENT
Start: 2024-05-13 | End: 2024-05-13

## 2024-05-13 RX ORDER — OXYCODONE HYDROCHLORIDE 5 MG/1
5 TABLET ORAL EVERY 6 HOURS PRN
Status: DISCONTINUED | OUTPATIENT
Start: 2024-05-13 | End: 2024-05-14

## 2024-05-13 RX ORDER — HYDROCHLOROTHIAZIDE 25 MG/1
25 TABLET ORAL DAILY
Qty: 30 TABLET | Refills: 3 | Status: ON HOLD
Start: 2024-05-14

## 2024-05-13 RX ORDER — MORPHINE SULFATE 2 MG/ML
4 INJECTION, SOLUTION INTRAMUSCULAR; INTRAVENOUS EVERY 4 HOURS PRN
Status: DISCONTINUED | OUTPATIENT
Start: 2024-05-13 | End: 2024-05-20 | Stop reason: HOSPADM

## 2024-05-13 RX ORDER — M-VIT,TX,IRON,MINS/CALC/FOLIC 27MG-0.4MG
1 TABLET ORAL DAILY
Status: DISCONTINUED | OUTPATIENT
Start: 2024-05-14 | End: 2024-05-17 | Stop reason: SDUPTHER

## 2024-05-13 RX ORDER — SODIUM CHLORIDE 0.9 % (FLUSH) 0.9 %
5-40 SYRINGE (ML) INJECTION EVERY 12 HOURS SCHEDULED
Status: DISCONTINUED | OUTPATIENT
Start: 2024-05-13 | End: 2024-05-20 | Stop reason: HOSPADM

## 2024-05-13 RX ORDER — AMIODARONE HYDROCHLORIDE 200 MG/1
200 TABLET ORAL DAILY
Status: DISCONTINUED | OUTPATIENT
Start: 2024-05-14 | End: 2024-05-20 | Stop reason: HOSPADM

## 2024-05-13 RX ORDER — SODIUM CHLORIDE 0.9 % (FLUSH) 0.9 %
5-40 SYRINGE (ML) INJECTION EVERY 12 HOURS SCHEDULED
Status: DISCONTINUED | OUTPATIENT
Start: 2024-05-13 | End: 2024-05-13 | Stop reason: HOSPADM

## 2024-05-13 RX ORDER — AMLODIPINE BESYLATE 5 MG/1
2.5 TABLET ORAL NIGHTLY
Status: DISCONTINUED | OUTPATIENT
Start: 2024-05-13 | End: 2024-05-13 | Stop reason: HOSPADM

## 2024-05-13 RX ORDER — POTASSIUM CHLORIDE 20 MEQ/1
40 TABLET, EXTENDED RELEASE ORAL PRN
Status: DISCONTINUED | OUTPATIENT
Start: 2024-05-13 | End: 2024-05-13 | Stop reason: HOSPADM

## 2024-05-13 RX ORDER — POTASSIUM CHLORIDE 20 MEQ/1
20 TABLET, EXTENDED RELEASE ORAL DAILY
Status: DISCONTINUED | OUTPATIENT
Start: 2024-05-13 | End: 2024-05-13 | Stop reason: HOSPADM

## 2024-05-13 RX ORDER — M-VIT,TX,IRON,MINS/CALC/FOLIC 27MG-0.4MG
1 TABLET ORAL DAILY
Status: DISCONTINUED | OUTPATIENT
Start: 2024-05-13 | End: 2024-05-13 | Stop reason: HOSPADM

## 2024-05-13 RX ORDER — DULOXETIN HYDROCHLORIDE 30 MG/1
60 CAPSULE, DELAYED RELEASE ORAL DAILY
Status: ON HOLD | COMMUNITY
Start: 2024-02-16 | End: 2024-05-20 | Stop reason: HOSPADM

## 2024-05-13 RX ORDER — ONDANSETRON 2 MG/ML
4 INJECTION INTRAMUSCULAR; INTRAVENOUS EVERY 6 HOURS PRN
Status: DISCONTINUED | OUTPATIENT
Start: 2024-05-13 | End: 2024-05-20 | Stop reason: HOSPADM

## 2024-05-13 RX ORDER — POLYETHYLENE GLYCOL 3350 17 G/17G
17 POWDER, FOR SOLUTION ORAL DAILY PRN
Status: DISCONTINUED | OUTPATIENT
Start: 2024-05-13 | End: 2024-05-20 | Stop reason: HOSPADM

## 2024-05-13 RX ORDER — PRAVASTATIN SODIUM 20 MG
40 TABLET ORAL DAILY
Status: DISCONTINUED | OUTPATIENT
Start: 2024-05-14 | End: 2024-05-20 | Stop reason: HOSPADM

## 2024-05-13 RX ORDER — OXYCODONE HYDROCHLORIDE AND ACETAMINOPHEN 5; 325 MG/1; MG/1
1 TABLET ORAL EVERY 4 HOURS PRN
Status: DISCONTINUED | OUTPATIENT
Start: 2024-05-13 | End: 2024-05-13 | Stop reason: HOSPADM

## 2024-05-13 RX ORDER — THIAMINE MONONITRATE (VIT B1) 100 MG
100 TABLET ORAL DAILY
Qty: 10 TABLET | Refills: 0 | Status: ON HOLD
Start: 2024-05-14

## 2024-05-13 RX ORDER — ACETAMINOPHEN 325 MG/1
650 TABLET ORAL EVERY 6 HOURS PRN
Status: DISCONTINUED | OUTPATIENT
Start: 2024-05-13 | End: 2024-05-13 | Stop reason: HOSPADM

## 2024-05-13 RX ORDER — DIGOXIN 0.25 MG/ML
125 INJECTION INTRAMUSCULAR; INTRAVENOUS DAILY
Status: DISCONTINUED | OUTPATIENT
Start: 2024-05-13 | End: 2024-05-13 | Stop reason: HOSPADM

## 2024-05-13 RX ADMIN — OXYCODONE HYDROCHLORIDE AND ACETAMINOPHEN 1 TABLET: 5; 325 TABLET ORAL at 17:38

## 2024-05-13 RX ADMIN — SODIUM CHLORIDE, PRESERVATIVE FREE 10 ML: 5 INJECTION INTRAVENOUS at 11:09

## 2024-05-13 RX ADMIN — PIPERACILLIN AND TAZOBACTAM 3375 MG: 3; .375 INJECTION, POWDER, LYOPHILIZED, FOR SOLUTION INTRAVENOUS at 15:41

## 2024-05-13 RX ADMIN — AMIODARONE HYDROCHLORIDE 200 MG: 200 TABLET ORAL at 05:44

## 2024-05-13 RX ADMIN — IPRATROPIUM BROMIDE 0.5 MG: 0.5 SOLUTION RESPIRATORY (INHALATION) at 09:46

## 2024-05-13 RX ADMIN — MULTIPLE VITAMINS W/ MINERALS TAB 1 TABLET: TAB at 09:03

## 2024-05-13 RX ADMIN — HYDROCHLOROTHIAZIDE 25 MG: 25 TABLET ORAL at 09:03

## 2024-05-13 RX ADMIN — PANTOPRAZOLE SODIUM 40 MG: 40 INJECTION, POWDER, FOR SOLUTION INTRAVENOUS at 09:04

## 2024-05-13 RX ADMIN — PIPERACILLIN AND TAZOBACTAM 3375 MG: 3; .375 INJECTION, POWDER, LYOPHILIZED, FOR SOLUTION INTRAVENOUS at 20:34

## 2024-05-13 RX ADMIN — SENNOSIDES AND DOCUSATE SODIUM 1 TABLET: 8.6; 5 TABLET ORAL at 20:34

## 2024-05-13 RX ADMIN — SODIUM CHLORIDE, PRESERVATIVE FREE 10 ML: 5 INJECTION INTRAVENOUS at 23:40

## 2024-05-13 RX ADMIN — VANCOMYCIN HYDROCHLORIDE 2000 MG: 10 INJECTION, POWDER, LYOPHILIZED, FOR SOLUTION INTRAVENOUS at 11:06

## 2024-05-13 RX ADMIN — IPRATROPIUM BROMIDE 0.5 MG: 0.5 SOLUTION RESPIRATORY (INHALATION) at 19:46

## 2024-05-13 RX ADMIN — SODIUM CHLORIDE, PRESERVATIVE FREE 10 ML: 5 INJECTION INTRAVENOUS at 20:35

## 2024-05-13 RX ADMIN — OXYCODONE HYDROCHLORIDE AND ACETAMINOPHEN 2 TABLET: 5; 325 TABLET ORAL at 09:03

## 2024-05-13 RX ADMIN — DULOXETINE HYDROCHLORIDE 60 MG: 30 CAPSULE, DELAYED RELEASE ORAL at 09:03

## 2024-05-13 RX ADMIN — PRAVASTATIN SODIUM 40 MG: 20 TABLET ORAL at 20:34

## 2024-05-13 RX ADMIN — SODIUM CHLORIDE, PRESERVATIVE FREE 10 ML: 5 INJECTION INTRAVENOUS at 09:04

## 2024-05-13 RX ADMIN — THIAMINE HCL TAB 100 MG 100 MG: 100 TAB at 09:03

## 2024-05-13 RX ADMIN — POTASSIUM CHLORIDE 20 MEQ: 1500 TABLET, EXTENDED RELEASE ORAL at 09:03

## 2024-05-13 RX ADMIN — IPRATROPIUM BROMIDE 0.5 MG: 0.5 SOLUTION RESPIRATORY (INHALATION) at 15:10

## 2024-05-13 RX ADMIN — FUROSEMIDE 40 MG: 10 INJECTION, SOLUTION INTRAMUSCULAR; INTRAVENOUS at 03:24

## 2024-05-13 RX ADMIN — PIPERACILLIN AND TAZOBACTAM 3375 MG: 3; .375 INJECTION, POWDER, LYOPHILIZED, FOR SOLUTION INTRAVENOUS at 05:44

## 2024-05-13 RX ADMIN — Medication 1000 UNITS: at 09:04

## 2024-05-13 RX ADMIN — ACETAMINOPHEN 650 MG: 325 TABLET ORAL at 08:42

## 2024-05-13 RX ADMIN — PERFLUTREN 1.5 ML: 6.52 INJECTION, SUSPENSION INTRAVENOUS at 09:12

## 2024-05-13 ASSESSMENT — PAIN SCALES - GENERAL
PAINLEVEL_OUTOF10: 0
PAINLEVEL_OUTOF10: 1
PAINLEVEL_OUTOF10: 5
PAINLEVEL_OUTOF10: 0
PAINLEVEL_OUTOF10: 6

## 2024-05-13 ASSESSMENT — PAIN DESCRIPTION - DESCRIPTORS
DESCRIPTORS: SORE;SHARP
DESCRIPTORS: SHARP
DESCRIPTORS: SHARP

## 2024-05-13 ASSESSMENT — PAIN DESCRIPTION - ORIENTATION
ORIENTATION: RIGHT;LEFT
ORIENTATION: ANTERIOR

## 2024-05-13 ASSESSMENT — PAIN DESCRIPTION - FREQUENCY: FREQUENCY: INTERMITTENT

## 2024-05-13 ASSESSMENT — PAIN DESCRIPTION - LOCATION
LOCATION: CHEST;RIB CAGE
LOCATION: CHEST;RIB CAGE
LOCATION: CHEST

## 2024-05-13 NOTE — ACP (ADVANCE CARE PLANNING)
Advance Care Planning     Advance Care Planning Inpatient Note  [facility]    Today's Date: 5/13/2024  Unit: [facility] 1 INTENSIVE CARE    Received request from IDT Member.  Upon review of chart and communication with care team, patient's decision making abilities are not in question.. Patient was alone in the room during visit.    Goals of ACP Conversation:  Discuss advance care planning documents    Health Care Decision Makers:       Primary Decision Maker: Garrick Avila - Child - [phone]    Secondary Decision Maker: Shae Shook - Domestic Partner - [phone]    Summary:  Completed New Documents    Advance Care Planning Documents (Patient Wishes):  Healthcare Power of /Advance Directive Appointment of Health Care Agent  Living Will/Advance Directive  Anatomical Gift/Organ Donation     Assessment:  Patient had previously completed one but doesn't know where it is. He was open to completing a new one and talking through the Living Will section.  Patient is anxious about being back after being discharged recently. Patient likely going to be transferred to a higher level of care.  Patient is active at [facility] [employer]. He asked me to let Fr Meadows know he was here again but that he did not need to come.  Fr Meadows came after lunch and provided SOS.     Interventions:  Provided education on documents for clarity and greater understanding  Discussed and provided education on state decision maker hierarchy  Assisted in the completion of documents according to patient's wishes at this time  Encouraged ongoing ACP conversation with future decision makers and loved ones    Care Preferences Communicated:   No    Outcomes/Plan:  ACP Discussion: Completed  New advance directive completed.  Returned original document(s) to patient, as well as copies for distribution to appointed agents  Copy of advance directive given to staff to scan into medical record.    Electronically signed by KURT  patient

## 2024-05-13 NOTE — ED TRIAGE NOTES
Pt arrived via EMS from [facility]. Pt complains of SOB and chest pain. On Saturday, pt fell off horse which resulted in multiple broken ribs and a pneumo. Pt on 10L nonrebreather on arrival.

## 2024-05-13 NOTE — PROGRESS NOTES
Case discussed with thoracic surgery anthony cleso Hidalgo accepted for transfer for possible chest tube vs other procedures    Hemothorax post trauma  Accepting physician would be hospitalist  Dr. CARIN Hale MD   Libtayo Pregnancy And Lactation Text: This medication is contraindicated in pregnancy and when breast feeding.

## 2024-05-13 NOTE — ED NOTES
TRANSFER - OUT REPORT:    Verbal report given to GABRIELA Manzo on Julien Avila  being transferred to 105 for routine progression of patient care       Report consisted of patient's Situation, Background, Assessment and   Recommendations(SBAR).     Information from the following report(s) ED SBAR was reviewed with the receiving nurse.    Rene Fall Assessment:    Presents to emergency department  because of falls (Syncope, seizure, or loss of consciousness): No  Age > 70: Yes  Altered Mental Status, Intoxication with alcohol or substance confusion (Disorientation, impaired judgment, poor safety awaremess, or inability to follow instructions): No  Impaired Mobility: Ambulates or transfers with assistive devices or assistance; Unable to ambulate or transer.: No  Nursing Judgement: No          Lines:   Peripheral IV 05/12/24 Right Antecubital (Active)        Opportunity for questions and clarification was provided.      Patient transported with:  Registered Nurse

## 2024-05-13 NOTE — CARE COORDINATION
05/13/24 1504   Service Assessment   Patient Orientation Alert and Oriented   Cognition Alert   History Provided By Patient;Spouse   Primary Caregiver Self   Accompanied By/Relationship Spouse María   Support Systems Spouse/Significant Other   Patient's Healthcare Decision Maker is: Legal Next of Kin   PCP Verified by CM Yes   Last Visit to PCP Within last 3 months   Prior Functional Level Independent in ADLs/IADLs   Current Functional Level Independent in ADLs/IADLs   Can patient return to prior living arrangement Yes   Family able to assist with home care needs: Yes   Would you like for me to discuss the discharge plan with any other family members/significant others, and if so, who? Yes   Financial Resources None   Community Resources None   CM/SW Referral No PCP   Social/Functional History   Lives With Spouse   Type of Home Apartment   Home Layout Two level   Bathroom Toilet Standard   Home Equipment Rollator   Receives Help From Family   ADL Assistance Independent   Homemaking Assistance Independent   Homemaking Responsibilities No   Ambulation Assistance Independent   Transfer Assistance Independent   Active  Yes   Mode of Transportation Car   Discharge Planning   Type of Residence Apartment   Living Arrangements Spouse/Significant Other   Current Services Prior To Admission C-pap   Potential Assistance Needed N/A   DME Ordered? No   Potential Assistance Purchasing Medications No   Type of Home Care Services None   Patient expects to be discharged to: Apartment   Follow Up Appointment: Best Day/Time  Monday AM   History of falls? 1   Services At/After Discharge   Transition of Care Consult (CM Consult) Home Health   Services At/After Discharge PT   [place] Resource Information Provided? No   Mode of Transport at Discharge Self   Confirm Follow Up Transport Family   Condition of Participation: Discharge Planning   The Plan for Transition of Care is related to the following treatment goals: Home

## 2024-05-13 NOTE — DISCHARGE SUMMARY
Performed by: Kenroy Rajan DO Authorized by: Kenroy Rajan DO   Indication:   Indication for exam:  Blunt trauma   Purpose of exam:  Diagnostic Abdominal Findings:   Hepatorenal (RUQ) Free Fluid:  Absent   Perisplenic (LUQ) Free Fluid:  Absent   Suprapubic Free Fluid:  Absent Cardiac Findings:   Pericardial Effusion:  Present (Trace) Chest findings:   Right Lung Sliding:  Present   Left Lung Sliding:  Present Other findings:  None Image documentation:  Saved    CT CHEST ABDOMEN PELVIS W CONTRAST Additional Contrast? None    Result Date: 5/4/2024  INDICATION: fall off horse, on xarelto COMPARISON: 3/10/2010 TECHNIQUE:  Following the uneventful intravenous administration of 100 cc Isovue-370, 5 mm axial images were obtained through the chest, abdomen, and pelvis. Oral contrast was not administered.  Coronal and sagittal reconstructions were generated. CT dose reduction was achieved through use of a standardized protocol tailored for this examination and automatic exposure control for dose modulation. FINDINGS: THYROID: No nodule. MEDIASTINUM: No mass or lymphadenopathy. JUAN: No mass or lymphadenopathy. THORACIC AORTA: No dissection or aneurysm. Mild vascular calcifications MAIN PULMONARY ARTERY: Normal in caliber. TRACHEA/BRONCHI: Patent. ESOPHAGUS: No wall thickening or dilatation. HEART: Normal in size.  Coronary artery calcium:  absent. PLEURA: Moderate right pleural effusion of intermediate density LUNGS: Left basilar atelectasis LIVER: Cyst left hepatic lobe GALLBLADDER: Unremarkable. SPLEEN: No mass. PANCREAS: No mass or ductal dilatation. ADRENALS: Unremarkable. KIDNEYS: Left renal cyst. No further evaluation. No hydronephrosis. Contrast within the collecting systems evaluation for stone difficult STOMACH: Unremarkable. SMALL BOWEL: No dilatation or wall thickening. COLON: Left-sided diverticulosis. Mild inflammation of a diverticulum at the junction of the descending and sigmoid colon no drainable fluid

## 2024-05-13 NOTE — CONSULTS
Cardiovascular Consultation Note    Julien Avila 78 y.o.  : 1945  MRN: [id number]    PCP: Tommie Gillespie MD    DOA: 2024     Impression:   Acute hypoxemic respiratory failure  Pleural effusion likely hemothorax  History of chest trauma  Atrial fibrillation with rapid ventricular response now converted to sinus rhythm  History of obstructive sleep apnea      Recommendations:   Hold anticoagulation  Echocardiogram  Transfer to [facility] with thoracic surgery coverage is being considered        Thank you for allowing us to participate in the care of this patient.    Sincerely,    URBANO GAN MD  May 13, 2024, 3:21 PM    HPI:   Julien Avila is a 78 y.o. male who is being seen in cardiology consultation for atrial fibrillation with rapid ventricular response. Julien Avila is a 78 y.o.  male who has hx of Atrial fibrillation, sleep apnea,on CPAP  HTN presents to ER for second time in one week after falling off a horse with injuries to his chest.  He was found to have a hemothorax and was sent to [facility] for further workup.  He was discharged to rehab on 4 L of oxygen.  The patient discharged himself from rehab due to inconsistent meds and worsening of his dyspnea.  In the ER here he was found to be tachypneic and had atrial fibrillation rapid ventricular response.  CT confirmed large pleural effusion but apparently no blood.  He was admitted for further evaluation and treatment.  I was asked to see him because of atrial fibrillation with ventricular response.  He was started on amiodarone and when I saw him in ICU he had converted to sinus rhythm.    Chief complaint: Shortness of breath        Past Medical History:   Diagnosis Date    Acid reflux     Adverse effect of anesthesia     hangover from anes    Arthritis     Atrial fibrillation (HCC)     Paroxysmal    Cancer (HCC)     skin    Chest pain 2011    DVT of lower extremity (deep venous thrombosis) (HCC)     Right, after horse stepped on me    
INTERVENTIONAL RADIOLOGY    Patient:  Julien Avila  :  1945  Age:  78 y.o.  MRN:  [id number]    Today's Date:  2024  Admission Date:  2024  Hospital Day:  0    Julien Avila is a 78 y.o. male with a history of Afib, HTN, hemothorax following fall from horse 2 wk ago. Presented to ED with SOB. CT shows R>L pleural effusions.    IR consulted for US guided right thoracentesis.   However, patient has since been accepted for transfer to other facility for CT surgery consult.   No thoracentesis prior to transfer.    Discussed with primary RN, IR nursing.    SOFIA Clinton - CNP  [facility]  
Patient see for wound consult.  Skin intact pink and blanchable  see wound note for recommendations.   
Ph:[phone]         No results for input(s): \"PNW7BZC\", \"T4FREE\", \"F3OEVET\" in the last 72 hours.     Urinalysis Lab Results   Component Value Date/Time    COLORU YELLOW 12/22/2020 03:07 PM    CLARITYU CLEAR 12/22/2020 03:07 PM    PHUR 6.5 12/22/2020 03:07 PM    PROTEINU Negative 12/22/2020 03:07 PM    GLUCOSEU Negative 12/22/2020 03:07 PM    KETUA Negative 12/22/2020 03:07 PM    BILIRUBINUR Negative 12/22/2020 03:07 PM    NITRU Negative 12/22/2020 03:07 PM    LEUKOCYTESUR TRACE 12/22/2020 03:07 PM        Micro  Results       Procedure Component Value Units Date/Time    Gram stain [[id number]]     Order Status: Sent Specimen: Body Fluid     Culture, Body Fluid [[id number]]     Order Status: Sent Specimen: Body Fluid     Gram stain [[id number]]     Order Status: Canceled Specimen: Body Fluid     Culture, Body Fluid [[id number]]     Order Status: Canceled Specimen: Body Fluid     COVID-19 & Influenza Combo [[id number]] Collected: 05/12/24 2029    Order Status: Completed Specimen: Nasopharyngeal Updated: 05/12/24 2115     SARS-CoV-2, PCR Not detected        Comment: Not Detected results do not preclude SARS-CoV-2 infection and should not be used as the sole basis for patient management decisions.Results must be combined with clinical observations, patient history, and epidemiological information.        Rapid Influenza A By PCR Not detected        Rapid Influenza B By PCR Not detected        Comment: Testing was performed using elbert Rabia SARS-CoV-2 and Influenza A/B nucleic acid assay.  This test is a multiplex Real-Time Reverse Transcriptase Polymerase Chain Reaction (RT-PCR) based in vitro diagnostic test intended for the qualitative detection of nucleic acids from SARS-CoV-2, Influenza A, and Influenza B in nasopharyngeal for use under the FDA's Emergency Use Authorization(EAU) only.     Fact sheet for Patients: https://www.fda.gov/media/650436/download  Fact sheet for Healthcare Providers:

## 2024-05-13 NOTE — PROGRESS NOTES
Hemothorax post trauma worsening effusions R>L on high flow  Known trauma about 2 weeks ago fell of horse multiple rib fractures  Ct surgery for consultation possible chest tube? Complex thoracic case will initiate transfer   His BNP is only 69 very unlikely CHF related. Patient  has known Afib  AC on hold   Full note to follow  MAYRA Hale MD

## 2024-05-13 NOTE — ED PROVIDER NOTES
EMERGENCY DEPARTMENT HISTORY AND PHYSICAL EXAM      Date: 5/12/2024  Patient Name: Julien Avila      History of Presenting Illness     Chief Complaint   Patient presents with    Shortness of Breath     CP and SOB, 99% on 4L, Cvd negative    Chest Pain       Location/Duration/Severity/Modifying factors   Chief Complaint   Patient presents with    Shortness of Breath     CP and SOB, 99% on 4L, Cvd negative    Chest Pain       HPI:  Julien Avila is a 78 y.o. male with PMH significant for recent admission for right-sided hemothorax for which she did not receive a chest tube, T6-T10 spinous process fractures, T9 oblique fracture after falling off of a horse T9 anterior inferior vertebral body margin fracture presents with shortness of breath over the past few days since being transferred to rehab facility.  Was saturating 9% while lying flat per EMS on his 4 L via nasal cannula.  EMS also noted a fever of 100.2.  Pt also notes a productive cough but denies known fevers, chest pain or pressure, other complaints.    PCP: Tommie Gillespie MD    Current Facility-Administered Medications   Medication Dose Route Frequency Provider Last Rate Last Admin    sodium chloride flush 0.9 % injection 5-40 mL  5-40 mL IntraVENous 2 times per day Robin Nelson, DO        sodium chloride flush 0.9 % injection 5-40 mL  5-40 mL IntraVENous PRN Robin Nelson,         0.9 % sodium chloride infusion   IntraVENous PRN Robin Nelson, DO         Current Outpatient Medications   Medication Sig Dispense Refill    amiodarone (CORDARONE) 200 MG tablet Take 200 mg by mouth daily      amLODIPine (NORVASC) 2.5 MG tablet Take 2.5 mg by mouth      vitamin D 25 MCG (1000 UT) CAPS Take 1,000 Units by mouth daily      diazePAM (VALIUM) 5 MG tablet Take 1/2 to 1 tablet by mouth every 8 hours as needed for muscle pain and stiffness  Indications: muscle spasm      DULoxetine (CYMBALTA) 60 MG extended release capsule Take 60 mg by mouth

## 2024-05-13 NOTE — H&P
88 degrees    Diagnosis       Atrial fibrillation with rapid ventricular response  Nonspecific ST abnormality  Prolonged QT  Abnormal ECG  When compared with ECG of 12-MAY-2024 20:08, (Unconfirmed)  Atrial fibrillation has replaced Sinus rhythm  QT has lengthened           Procedures/imaging: see electronic medical records for all procedures/Xrays and details which were not copied into this note but were reviewed prior to creation of Plan      Assessment/Plan     Principal Problem:    Acute respiratory failure with hypoxia (HCC)  Resolved Problems:      Cv  Paroxysmal afib  Cont coreg for rate control   Hold xarolto  Check echo  Trend troponin    Pulmonary  Pleural effusion  Multiple rib fx  Incentive spirometer  Patient uses cpap at night  Hold xarelto  For IR guided thoracentesis   IV lasix for now dose daily     GI  Protonix for stress protection    ID   No obvious infection  De escalate abx as appropriate  Procal check      Mild yanni  Adjust lasix for creatine  One time dose of diuretic given on admission    Hematology  Hold xarelto  Last dose 5/12 pm    FEN  Place on electrolyte protocol    Psych  Hx of Alcohol abuse   Recently quit prior to fall   No CIWA  But low threshold  Start MVI thiamine        DVT/GI Prophylaxis:  xarelto on hold/protonix        Ankur Galvin MD  5/13/2024 4:43 AM

## 2024-05-13 NOTE — PROGRESS NOTES
Zosyn (Piperacillin/Tazobactam) Extended Infusion    Julien Avila, a 78 y.o. yo male, has been converted to an extended infusion of Zosyn while in the intensive care unit.    A loading dose of 3.375 or 4.5 gm will be given over 30 minutes depending on indication.    Extended infusions will run over 4 hours (240 minutes).  Dose adjusted to:  Zosyn 3.375 grams IV q8h  (each dose to infuse over 240 minutes) x 5 days.     (Prior dose:   Zosyn 3.375 grams IV q6h  x 5 days)   Indication: COPD exacerbation    Invalid input(s): \"CREA\"  Ht Readings from Last 1 Encounters:   05/13/24 1.753 m (5' 9\")     Wt Readings from Last 1 Encounters:   05/13/24 118.8 kg (262 lb)       CrCl : Serum creatinine: 1.23 mg/dL 05/13/24 0333  Estimated creatinine clearance: 65 mL/min    Renal adjustment of extended infusion of Zosyn  3.375 or 4.5 gm every 8 hours for CrCl >/= 20 ml/min  3.375 or 4.5 gm every 12 hours for CrCl < 20 ml/min, intermittent HD or PD    Pharmacy to continue to monitor daily.   Anuradha Washington, PharmD

## 2024-05-13 NOTE — PLAN OF CARE
Problem: Discharge Planning  Goal: Discharge to home or other facility with appropriate resources  Outcome: Progressing  Flowsheets (Taken 5/13/2024 0910)  Discharge to home or other facility with appropriate resources:   Identify barriers to discharge with patient and caregiver   Arrange for needed discharge resources and transportation as appropriate   Identify discharge learning needs (meds, wound care, etc)   Arrange for interpreters to assist at discharge as needed   Refer to discharge planning if patient needs post-hospital services based on physician order or complex needs related to functional status, cognitive ability or social support system  Note: Patient will be provided with appropriate resources for a safe discharge home this admission.      Problem: Pain  Goal: Verbalizes/displays adequate comfort level or baseline comfort level  5/13/2024 0910 by Tae Loja RN  Outcome: Progressing  Flowsheets (Taken 5/13/2024 0800 by Kiara Rawls, RN)  Verbalizes/displays adequate comfort level or baseline comfort level:   Encourage patient to monitor pain and request assistance   Assess pain using appropriate pain scale   Administer analgesics based on type and severity of pain and evaluate response   Implement non-pharmacological measures as appropriate and evaluate response  Note: Patient will receive efficient pain management with appropriate intervention.   5/13/2024 0336 by Anais Kaminski RN  Outcome: Progressing  Flowsheets (Taken 5/13/2024 0310)  Verbalizes/displays adequate comfort level or baseline comfort level:   Encourage patient to monitor pain and request assistance   Assess pain using appropriate pain scale   Administer analgesics based on type and severity of pain and evaluate response   Implement non-pharmacological measures as appropriate and evaluate response   Consider cultural and social influences on pain and pain management   Notify Licensed Independent Practitioner if

## 2024-05-13 NOTE — PROGRESS NOTES
[facility]   Pharmacy Pharmacokinetic Monitoring Service - Vancomycin     Julien Avila is a 78 y.o. male starting on vancomycin therapy for HAP (7 days) . Pharmacy consulted by Dr. Hale for monitoring and adjustment.    Target Concentration: Goal AUC/KAMILA 400-600 mg*hr/L    Additional Antimicrobials: Zosyn    Pertinent Laboratory Values:   Wt Readings from Last 1 Encounters:   05/13/24 118.8 kg (262 lb)     Temp Readings from Last 1 Encounters:   05/13/24 98.2 °F (36.8 °C) (Oral)     Estimated Creatinine Clearance: 65 mL/min (based on SCr of 1.23 mg/dL).  Recent Labs     05/12/24 2025 05/13/24  0333   CREATININE 1.35* 1.23   BUN 22* 24*   WBC 12.7 13.0     Plan:  Dosing recommendations based on Bayesian software  Start Vancomycin 2000 mg IV once, 5/13/24        Maintenance dose:  Vancomycin 1250 mg IV q24h, starting on 5/14/24  Anticipated AUC of 507 mg/L.hr and trough concentration of 13.3 mg/L at steady state  Renal labs as indicated   Pharmacy will continue to monitor patient and adjust therapy as indicated    Thank you for the consult,  Anuradha Washington, PharmD  5/13/2024 11:03 AM

## 2024-05-13 NOTE — WOUND CARE
Patient has pink intact blanchable skin to medial buttocks and coccyx area.  Patient's back and upper buttocks very moist with sweat.  Patient should be switched to a regular fitted sheet instead of lift sheet and pads should be monitored a changed when wet.  I instructed patient to lay on side several times during the day so that back can cool and dry.  Consult wound care if further skin issues develop.

## 2024-05-13 NOTE — PROGRESS NOTES
[facility] Dr[facility] CARIN Aguilar  For thoracic surgery consultation chest tube vs vats   Hemothorax  Thoracic surgery discussion done today with Dr. Gwen Hale MD

## 2024-05-14 ENCOUNTER — APPOINTMENT (OUTPATIENT)
Facility: HOSPITAL | Age: 79
DRG: 199 | End: 2024-05-14
Attending: STUDENT IN AN ORGANIZED HEALTH CARE EDUCATION/TRAINING PROGRAM
Payer: MEDICARE

## 2024-05-14 LAB
ALBUMIN SERPL-MCNC: 3 G/DL (ref 3.4–5)
ALBUMIN/GLOB SERPL: 0.8 (ref 0.8–1.7)
ALP SERPL-CCNC: 96 U/L (ref 45–117)
ALT SERPL-CCNC: 32 U/L (ref 16–61)
ANION GAP SERPL CALC-SCNC: 6 MMOL/L (ref 3–18)
APPEARANCE FLD: ABNORMAL
APTT PPP: 39.1 SEC (ref 23–36.4)
AST SERPL-CCNC: 25 U/L (ref 10–38)
BASOPHILS # BLD: 0.1 K/UL (ref 0–0.1)
BASOPHILS NFR BLD: 1 % (ref 0–2)
BILIRUB SERPL-MCNC: 1.4 MG/DL (ref 0.2–1)
BUN SERPL-MCNC: 26 MG/DL (ref 7–18)
BUN/CREAT SERPL: 25 (ref 12–20)
CALCIUM SERPL-MCNC: 8.9 MG/DL (ref 8.5–10.1)
CHLORIDE SERPL-SCNC: 99 MMOL/L (ref 100–111)
CO2 SERPL-SCNC: 27 MMOL/L (ref 21–32)
COLOR FLD: ABNORMAL
CREAT SERPL-MCNC: 1.05 MG/DL (ref 0.6–1.3)
DIFFERENTIAL METHOD BLD: ABNORMAL
EOSINOPHIL # BLD: 0.1 K/UL (ref 0–0.4)
EOSINOPHIL NFR BLD: 0 % (ref 0–5)
EOSINOPHIL NFR FLD MANUAL: 1 %
ERYTHROCYTE [DISTWIDTH] IN BLOOD BY AUTOMATED COUNT: 12.6 % (ref 11.6–14.5)
GLOBULIN SER CALC-MCNC: 3.9 G/DL (ref 2–4)
GLUCOSE FLD-MCNC: 62 MG/DL
GLUCOSE SERPL-MCNC: 147 MG/DL (ref 74–99)
HCT VFR BLD AUTO: 38.3 % (ref 36–48)
HGB BLD-MCNC: 13.2 G/DL (ref 13–16)
IMM GRANULOCYTES # BLD AUTO: 0.1 K/UL (ref 0–0.04)
IMM GRANULOCYTES NFR BLD AUTO: 1 % (ref 0–0.5)
INR PPP: 1.3 (ref 0.9–1.1)
LDH FLD L TO P-CCNC: 2443 U/L
LYMPHOCYTES # BLD: 1 K/UL (ref 0.9–3.6)
LYMPHOCYTES NFR BLD: 6 % (ref 21–52)
LYMPHOCYTES NFR FLD: 33 %
MCH RBC QN AUTO: 32.8 PG (ref 24–34)
MCHC RBC AUTO-ENTMCNC: 34.5 G/DL (ref 31–37)
MCV RBC AUTO: 95.3 FL (ref 78–100)
MONOCYTES # BLD: 1.4 K/UL (ref 0.05–1.2)
MONOCYTES NFR BLD: 9 % (ref 3–10)
MONOCYTES NFR FLD: 20 %
NEUTROPHILS NFR FLD: 46 % (ref 0–25)
NEUTS BAND # FLD: 0 %
NEUTS SEG # BLD: 12.9 K/UL (ref 1.8–8)
NEUTS SEG NFR BLD: 83 % (ref 40–73)
NRBC # BLD: 0 K/UL (ref 0–0.01)
NRBC BLD-RTO: 0 PER 100 WBC
NUC CELL # FLD: 4565 /CU MM
PLATELET # BLD AUTO: 302 K/UL (ref 135–420)
PMV BLD AUTO: 9.4 FL (ref 9.2–11.8)
POTASSIUM SERPL-SCNC: 3.7 MMOL/L (ref 3.5–5.5)
PROT FLD-MCNC: 4.2 G/DL
PROT SERPL-MCNC: 6.9 G/DL (ref 6.4–8.2)
PROTHROMBIN TIME: 15.9 SEC (ref 11.9–14.7)
RBC # BLD AUTO: 4.02 M/UL (ref 4.35–5.65)
RBC # FLD: ABNORMAL /CU MM
SODIUM SERPL-SCNC: 132 MMOL/L (ref 136–145)
SPECIMEN SOURCE FLD: ABNORMAL
SPECIMEN SOURCE FLD: NORMAL
WBC # BLD AUTO: 15.6 K/UL (ref 4.6–13.2)

## 2024-05-14 PROCEDURE — 87205 SMEAR GRAM STAIN: CPT

## 2024-05-14 PROCEDURE — 80053 COMPREHEN METABOLIC PANEL: CPT

## 2024-05-14 PROCEDURE — 85610 PROTHROMBIN TIME: CPT

## 2024-05-14 PROCEDURE — 85025 COMPLETE CBC W/AUTO DIFF WBC: CPT

## 2024-05-14 PROCEDURE — 6360000002 HC RX W HCPCS: Performed by: STUDENT IN AN ORGANIZED HEALTH CARE EDUCATION/TRAINING PROGRAM

## 2024-05-14 PROCEDURE — 94761 N-INVAS EAR/PLS OXIMETRY MLT: CPT

## 2024-05-14 PROCEDURE — 0W9930Z DRAINAGE OF RIGHT PLEURAL CAVITY WITH DRAINAGE DEVICE, PERCUTANEOUS APPROACH: ICD-10-PCS | Performed by: RADIOLOGY

## 2024-05-14 PROCEDURE — 83615 LACTATE (LD) (LDH) ENZYME: CPT

## 2024-05-14 PROCEDURE — 6370000000 HC RX 637 (ALT 250 FOR IP): Performed by: HOSPITALIST

## 2024-05-14 PROCEDURE — 5A09557 ASSISTANCE WITH RESPIRATORY VENTILATION, GREATER THAN 96 CONSECUTIVE HOURS, CONTINUOUS POSITIVE AIRWAY PRESSURE: ICD-10-PCS | Performed by: STUDENT IN AN ORGANIZED HEALTH CARE EDUCATION/TRAINING PROGRAM

## 2024-05-14 PROCEDURE — 99024 POSTOP FOLLOW-UP VISIT: CPT | Performed by: PHYSICIAN ASSISTANT

## 2024-05-14 PROCEDURE — 6360000002 HC RX W HCPCS: Performed by: RADIOLOGY

## 2024-05-14 PROCEDURE — 36415 COLL VENOUS BLD VENIPUNCTURE: CPT

## 2024-05-14 PROCEDURE — 6370000000 HC RX 637 (ALT 250 FOR IP): Performed by: STUDENT IN AN ORGANIZED HEALTH CARE EDUCATION/TRAINING PROGRAM

## 2024-05-14 PROCEDURE — 94660 CPAP INITIATION&MGMT: CPT

## 2024-05-14 PROCEDURE — 2500000003 HC RX 250 WO HCPCS: Performed by: RADIOLOGY

## 2024-05-14 PROCEDURE — 2580000003 HC RX 258: Performed by: STUDENT IN AN ORGANIZED HEALTH CARE EDUCATION/TRAINING PROGRAM

## 2024-05-14 PROCEDURE — 88305 TISSUE EXAM BY PATHOLOGIST: CPT

## 2024-05-14 PROCEDURE — 89051 BODY FLUID CELL COUNT: CPT

## 2024-05-14 PROCEDURE — 85730 THROMBOPLASTIN TIME PARTIAL: CPT

## 2024-05-14 PROCEDURE — 82945 GLUCOSE OTHER FLUID: CPT

## 2024-05-14 PROCEDURE — 87070 CULTURE OTHR SPECIMN AEROBIC: CPT

## 2024-05-14 PROCEDURE — 1100000000 HC RM PRIVATE

## 2024-05-14 PROCEDURE — 71045 X-RAY EXAM CHEST 1 VIEW: CPT

## 2024-05-14 PROCEDURE — 84157 ASSAY OF PROTEIN OTHER: CPT

## 2024-05-14 PROCEDURE — 99233 SBSQ HOSP IP/OBS HIGH 50: CPT | Performed by: HOSPITALIST

## 2024-05-14 PROCEDURE — 2700000000 HC OXYGEN THERAPY PER DAY

## 2024-05-14 PROCEDURE — 99152 MOD SED SAME PHYS/QHP 5/>YRS: CPT

## 2024-05-14 PROCEDURE — 88112 CYTOPATH CELL ENHANCE TECH: CPT

## 2024-05-14 RX ORDER — FENTANYL CITRATE 50 UG/ML
INJECTION, SOLUTION INTRAMUSCULAR; INTRAVENOUS PRN
Status: COMPLETED | OUTPATIENT
Start: 2024-05-14 | End: 2024-05-14

## 2024-05-14 RX ORDER — FENTANYL CITRATE 50 UG/ML
INJECTION, SOLUTION INTRAMUSCULAR; INTRAVENOUS
Status: DISPENSED
Start: 2024-05-14 | End: 2024-05-15

## 2024-05-14 RX ORDER — LIDOCAINE HYDROCHLORIDE 10 MG/ML
INJECTION, SOLUTION EPIDURAL; INFILTRATION; INTRACAUDAL; PERINEURAL PRN
Status: COMPLETED | OUTPATIENT
Start: 2024-05-14 | End: 2024-05-14

## 2024-05-14 RX ORDER — MIDAZOLAM HYDROCHLORIDE 2 MG/2ML
INJECTION, SOLUTION INTRAMUSCULAR; INTRAVENOUS PRN
Status: COMPLETED | OUTPATIENT
Start: 2024-05-14 | End: 2024-05-14

## 2024-05-14 RX ORDER — MIDAZOLAM HYDROCHLORIDE 1 MG/ML
INJECTION INTRAMUSCULAR; INTRAVENOUS
Status: DISPENSED
Start: 2024-05-14 | End: 2024-05-15

## 2024-05-14 RX ORDER — AMLODIPINE BESYLATE 5 MG/1
5 TABLET ORAL DAILY
Status: DISCONTINUED | OUTPATIENT
Start: 2024-05-14 | End: 2024-05-20 | Stop reason: HOSPADM

## 2024-05-14 RX ORDER — GUAIFENESIN/DEXTROMETHORPHAN 100-10MG/5
5 SYRUP ORAL EVERY 4 HOURS PRN
Status: DISCONTINUED | OUTPATIENT
Start: 2024-05-14 | End: 2024-05-20 | Stop reason: HOSPADM

## 2024-05-14 RX ORDER — OXYCODONE HYDROCHLORIDE 5 MG/1
10 TABLET ORAL EVERY 6 HOURS PRN
Status: DISCONTINUED | OUTPATIENT
Start: 2024-05-14 | End: 2024-05-20 | Stop reason: HOSPADM

## 2024-05-14 RX ADMIN — Medication 1 TABLET: at 00:04

## 2024-05-14 RX ADMIN — FENTANYL CITRATE 50 MCG: 50 INJECTION INTRAMUSCULAR; INTRAVENOUS at 15:16

## 2024-05-14 RX ADMIN — THIAMINE HCL TAB 100 MG 100 MG: 100 TAB at 08:14

## 2024-05-14 RX ADMIN — OXYCODONE HYDROCHLORIDE 10 MG: 10 TABLET ORAL at 21:22

## 2024-05-14 RX ADMIN — MORPHINE SULFATE 4 MG: 2 INJECTION, SOLUTION INTRAMUSCULAR; INTRAVENOUS at 10:29

## 2024-05-14 RX ADMIN — DULOXETINE HYDROCHLORIDE 60 MG: 60 CAPSULE, DELAYED RELEASE ORAL at 08:13

## 2024-05-14 RX ADMIN — Medication 1 TABLET: at 19:35

## 2024-05-14 RX ADMIN — SODIUM CHLORIDE, PRESERVATIVE FREE 10 ML: 5 INJECTION INTRAVENOUS at 08:13

## 2024-05-14 RX ADMIN — MIDAZOLAM HYDROCHLORIDE 1 MG: 1 INJECTION, SOLUTION INTRAMUSCULAR; INTRAVENOUS at 15:16

## 2024-05-14 RX ADMIN — AMIODARONE HYDROCHLORIDE 200 MG: 200 TABLET ORAL at 08:13

## 2024-05-14 RX ADMIN — FENTANYL CITRATE 50 MCG: 50 INJECTION INTRAMUSCULAR; INTRAVENOUS at 15:24

## 2024-05-14 RX ADMIN — Medication 1 TABLET: at 08:14

## 2024-05-14 RX ADMIN — AMLODIPINE BESYLATE 5 MG: 5 TABLET ORAL at 08:14

## 2024-05-14 RX ADMIN — PRAVASTATIN SODIUM 40 MG: 20 TABLET ORAL at 08:14

## 2024-05-14 RX ADMIN — Medication 1 TABLET: at 08:18

## 2024-05-14 RX ADMIN — SODIUM CHLORIDE, PRESERVATIVE FREE 10 ML: 5 INJECTION INTRAVENOUS at 19:37

## 2024-05-14 RX ADMIN — MORPHINE SULFATE 4 MG: 2 INJECTION, SOLUTION INTRAMUSCULAR; INTRAVENOUS at 16:34

## 2024-05-14 RX ADMIN — OXYCODONE HYDROCHLORIDE 5 MG: 5 TABLET ORAL at 03:37

## 2024-05-14 RX ADMIN — LIDOCAINE HYDROCHLORIDE 10 ML: 10 INJECTION, SOLUTION EPIDURAL; INFILTRATION; INTRACAUDAL; PERINEURAL at 15:20

## 2024-05-14 RX ADMIN — MIDAZOLAM HYDROCHLORIDE 1 MG: 1 INJECTION, SOLUTION INTRAMUSCULAR; INTRAVENOUS at 15:24

## 2024-05-14 ASSESSMENT — PAIN SCALES - GENERAL
PAINLEVEL_OUTOF10: 0
PAINLEVEL_OUTOF10: 0
PAINLEVEL_OUTOF10: 8
PAINLEVEL_OUTOF10: 5
PAINLEVEL_OUTOF10: 5
PAINLEVEL_OUTOF10: 0
PAINLEVEL_OUTOF10: 8

## 2024-05-14 ASSESSMENT — PAIN DESCRIPTION - LOCATION
LOCATION: RIB CAGE

## 2024-05-14 ASSESSMENT — PAIN DESCRIPTION - FREQUENCY
FREQUENCY: INTERMITTENT
FREQUENCY: CONTINUOUS

## 2024-05-14 ASSESSMENT — PAIN DESCRIPTION - ORIENTATION
ORIENTATION: RIGHT;LEFT
ORIENTATION: RIGHT
ORIENTATION: LEFT
ORIENTATION: MID;RIGHT

## 2024-05-14 ASSESSMENT — PAIN SCALES - WONG BAKER
WONGBAKER_NUMERICALRESPONSE: NO HURT
WONGBAKER_NUMERICALRESPONSE: NO HURT

## 2024-05-14 ASSESSMENT — PAIN DESCRIPTION - ONSET
ONSET: ON-GOING
ONSET: ON-GOING

## 2024-05-14 ASSESSMENT — PAIN DESCRIPTION - DESCRIPTORS
DESCRIPTORS: ACHING
DESCRIPTORS: ACHING
DESCRIPTORS: STABBING
DESCRIPTORS: STABBING

## 2024-05-14 ASSESSMENT — PAIN DESCRIPTION - PAIN TYPE
TYPE: ACUTE PAIN
TYPE: ACUTE PAIN

## 2024-05-14 ASSESSMENT — PAIN - FUNCTIONAL ASSESSMENT
PAIN_FUNCTIONAL_ASSESSMENT: PREVENTS OR INTERFERES SOME ACTIVE ACTIVITIES AND ADLS
PAIN_FUNCTIONAL_ASSESSMENT: PREVENTS OR INTERFERES WITH MANY ACTIVE NOT PASSIVE ACTIVITIES

## 2024-05-14 NOTE — PROGRESS NOTES
1930- Report and care received, assessment completed per flow sheet.    2002- Report called to receiving RN at [facility].    2044- EMS present, report given, care given to transport team. NAD. Patient notified wife of transport time.

## 2024-05-14 NOTE — CONSULTS
[facility]  -  Consult  5/14/2024    Julien Avila is a 78 y.o. male who is being seen on consult for R pleural effusion, at  Dr. Aguilar's request.    Assessment:   R rib/vertebral fx, trauma  R Mod pleural effusion, likely hemothorax  Dyspnea, on Bipap  BMI Readings from Last 1 Encounters:   05/13/24 38.37 kg/m²     Patient Active Problem List    Diagnosis Date Noted    Acute respiratory failure with hypoxia (HCC) 05/13/2024    Pleural effusion 05/13/2024    Hemothorax on right 05/13/2024    Obstructive sleep apnea 05/13/2024    Closed fracture of multiple ribs of left side 05/13/2024    Closed fracture of thoracic vertebra (HCC) 05/13/2024    Normocytic anemia 05/13/2024    Anxiety and depression 05/13/2024    Osteoarthritis of right hip 02/15/2021    Diverticulosis of colon 08/19/2015    Near syncope 03/30/2014    Hypopotassemia 06/06/2013    Bipolar disorder (HCC) 05/26/2013    HTN (hypertension) 05/26/2013    Cerebrovascular disease 05/26/2013    Long term (current) use of anticoagulants     LVH (left ventricular hypertrophy)     SOB (shortness of breath) 02/10/2011    Chest pain 02/09/2011    Atrial fibrillation (HCC) 02/09/2011       Plan:   CT per IR  We will follow CT    Agree to the above assessment and plan. Multiple Rib fractures with moderate right sided pleural effusion. Chest tube placement, need for VATS will depend on degree of lung expansion. Continue BIPAP and pain management   Subjective:     No chief complaint on file.    History of Present Illness:   Julien Avila is a 78 y.o. male with history of recent trauma d/t fall off a horse. He was initially treated conservatively at [facility] and sent to a SNF where he became increasingly SOB and d/t not getting care, checked himself out and went to [facility] ER where there found a mod R pleural effusion. The hospitalist requested a transfer to [facility] and CTS consult. Of note, he had a fall off a horse yars ago with ORIF of ribs. He needed an

## 2024-05-14 NOTE — CONSULTS
Interventional Radiology Consult Note  Patient: Julien Avila               Sex: male          DOA: 5/13/2024       YOB: 1945      Age:  78 y.o.        LOS:  LOS: 1 day              Assessment   Moderate right pleural effusion, likely with retained blood from recent trauma  Leukocytosis  Dyspnea  Afib on Xarelto, last dose 5/12  T9 acute vertebral compression fracture  Spinous process fractures of T6-T10    Right chest tube placement is indicated to improve respiratory status and to aid in diagnostics and guide further management.    Case and images reviewed by Dr. Pichardo.    Plan     Image guided right chest tube placement with moderate sedation as schedule allows 5/14/24    Additional specimen orders per referring team     Thank you,  CIHN Garrett  0330    HPI:     Consult for evaluation of right pleural effusion with dyspnea received from [facility] and reviewed with Dr. Pichardo.    Julien Avila is a 78 y.o. male with a PMH of recent trauma with fall off a horse 2 weeks prior resulting in rib and thoracic vertebral and spinous process fractures, Afib on Xarelto, prior trauma resulting in left rib fractures with surgical plating, and sleep apnea on CPAP at home who presented to [facility] as a transfer for CTS eval 5/14/24 for  dyspnea with hemothorax.  Patient was admitted for further evaluation and management.  Xarelto 5/12/24. Today the patient reports increased dyspnea. He states he feels very uncomfortable coming off of his CPAP machine due to coughing and dyspnea. Denies significant back pain, nausea, vomiting, fevers, chills.    The anticipated procedure was discussed in detail including risk of injury, infection, and bleeding. All questions were answered and concerns addressed. Informed consents were obtained.    Past Medical History:   Diagnosis Date    Acid reflux     Adverse effect of anesthesia     hangover from anes    Arthritis     Atrial fibrillation (HCC)

## 2024-05-14 NOTE — PROCEDURES
RADIOLOGY POST PROCEDURE NOTE     May 14, 2024        Preoperative Diagnosis:   Traumatic hemorrhagic large pleural effusion.    Postoperative Diagnosis:  Same.    :  Dr. Pichardo    Assistant:  None.    Type of Anesthesia: 1% plain lidocaine and IV moderate sedation with Versed and Fentanyl.    Procedure/Description:  Image guided right chest tube placement.    Findings:   No bleeding.    Estimated blood Loss:  Minimal    Specimen Removed:  Yes    Blood transfusions:  None.    Implants:  24F Thanl Quick right chest tube to suction.    Complications: None    Condition: Stable    Discharge Plan:  continue present therapy    Ted Pichardo MD

## 2024-05-15 ENCOUNTER — APPOINTMENT (OUTPATIENT)
Facility: HOSPITAL | Age: 79
DRG: 199 | End: 2024-05-15
Attending: STUDENT IN AN ORGANIZED HEALTH CARE EDUCATION/TRAINING PROGRAM
Payer: MEDICARE

## 2024-05-15 LAB
ANION GAP SERPL CALC-SCNC: 5 MMOL/L (ref 3–18)
BASOPHILS # BLD: 0.1 K/UL (ref 0–0.1)
BASOPHILS NFR BLD: 1 % (ref 0–2)
BUN SERPL-MCNC: 27 MG/DL (ref 7–18)
BUN/CREAT SERPL: 30 (ref 12–20)
CALCIUM SERPL-MCNC: 9.3 MG/DL (ref 8.5–10.1)
CHLORIDE SERPL-SCNC: 98 MMOL/L (ref 100–111)
CO2 SERPL-SCNC: 30 MMOL/L (ref 21–32)
CREAT SERPL-MCNC: 0.89 MG/DL (ref 0.6–1.3)
CYTOLOGY-NON GYN: NORMAL
DIFFERENTIAL METHOD BLD: ABNORMAL
EOSINOPHIL # BLD: 0.1 K/UL (ref 0–0.4)
EOSINOPHIL NFR BLD: 1 % (ref 0–5)
ERYTHROCYTE [DISTWIDTH] IN BLOOD BY AUTOMATED COUNT: 12.6 % (ref 11.6–14.5)
GLUCOSE SERPL-MCNC: 116 MG/DL (ref 74–99)
HCT VFR BLD AUTO: 37.5 % (ref 36–48)
HGB BLD-MCNC: 12.4 G/DL (ref 13–16)
IMM GRANULOCYTES # BLD AUTO: 0.1 K/UL (ref 0–0.04)
IMM GRANULOCYTES NFR BLD AUTO: 1 % (ref 0–0.5)
LYMPHOCYTES # BLD: 1.3 K/UL (ref 0.9–3.6)
LYMPHOCYTES NFR BLD: 9 % (ref 21–52)
MAGNESIUM SERPL-MCNC: 2.7 MG/DL (ref 1.6–2.6)
MCH RBC QN AUTO: 31.6 PG (ref 24–34)
MCHC RBC AUTO-ENTMCNC: 33.1 G/DL (ref 31–37)
MCV RBC AUTO: 95.7 FL (ref 78–100)
MONOCYTES # BLD: 1.1 K/UL (ref 0.05–1.2)
MONOCYTES NFR BLD: 8 % (ref 3–10)
NEUTS SEG # BLD: 10.8 K/UL (ref 1.8–8)
NEUTS SEG NFR BLD: 80 % (ref 40–73)
NRBC # BLD: 0 K/UL (ref 0–0.01)
NRBC BLD-RTO: 0 PER 100 WBC
PLATELET # BLD AUTO: 333 K/UL (ref 135–420)
PMV BLD AUTO: 9.4 FL (ref 9.2–11.8)
POTASSIUM SERPL-SCNC: 3.6 MMOL/L (ref 3.5–5.5)
RBC # BLD AUTO: 3.92 M/UL (ref 4.35–5.65)
SODIUM SERPL-SCNC: 133 MMOL/L (ref 136–145)
WBC # BLD AUTO: 13.5 K/UL (ref 4.6–13.2)

## 2024-05-15 PROCEDURE — 99024 POSTOP FOLLOW-UP VISIT: CPT | Performed by: PHYSICIAN ASSISTANT

## 2024-05-15 PROCEDURE — 80048 BASIC METABOLIC PNL TOTAL CA: CPT

## 2024-05-15 PROCEDURE — 83735 ASSAY OF MAGNESIUM: CPT

## 2024-05-15 PROCEDURE — 99232 SBSQ HOSP IP/OBS MODERATE 35: CPT | Performed by: HOSPITALIST

## 2024-05-15 PROCEDURE — 94761 N-INVAS EAR/PLS OXIMETRY MLT: CPT

## 2024-05-15 PROCEDURE — 36415 COLL VENOUS BLD VENIPUNCTURE: CPT

## 2024-05-15 PROCEDURE — 2700000000 HC OXYGEN THERAPY PER DAY

## 2024-05-15 PROCEDURE — 94640 AIRWAY INHALATION TREATMENT: CPT

## 2024-05-15 PROCEDURE — 6370000000 HC RX 637 (ALT 250 FOR IP): Performed by: HOSPITALIST

## 2024-05-15 PROCEDURE — 2580000003 HC RX 258: Performed by: STUDENT IN AN ORGANIZED HEALTH CARE EDUCATION/TRAINING PROGRAM

## 2024-05-15 PROCEDURE — 85025 COMPLETE CBC W/AUTO DIFF WBC: CPT

## 2024-05-15 PROCEDURE — 99221 1ST HOSP IP/OBS SF/LOW 40: CPT | Performed by: INTERNAL MEDICINE

## 2024-05-15 PROCEDURE — 71045 X-RAY EXAM CHEST 1 VIEW: CPT

## 2024-05-15 PROCEDURE — 6370000000 HC RX 637 (ALT 250 FOR IP): Performed by: STUDENT IN AN ORGANIZED HEALTH CARE EDUCATION/TRAINING PROGRAM

## 2024-05-15 PROCEDURE — 1100000000 HC RM PRIVATE

## 2024-05-15 RX ORDER — POTASSIUM CHLORIDE 1.5 G/1.58G
40 POWDER, FOR SOLUTION ORAL DAILY
Status: ON HOLD | COMMUNITY

## 2024-05-15 RX ORDER — SENNOSIDES A AND B 8.6 MG/1
2 TABLET, FILM COATED ORAL NIGHTLY
Status: DISCONTINUED | OUTPATIENT
Start: 2024-05-15 | End: 2024-05-20 | Stop reason: HOSPADM

## 2024-05-15 RX ORDER — IPRATROPIUM BROMIDE AND ALBUTEROL SULFATE 2.5; .5 MG/3ML; MG/3ML
1 SOLUTION RESPIRATORY (INHALATION) EVERY 6 HOURS PRN
Status: DISCONTINUED | OUTPATIENT
Start: 2024-05-15 | End: 2024-05-16

## 2024-05-15 RX ADMIN — ACETAMINOPHEN 325MG 650 MG: 325 TABLET ORAL at 18:32

## 2024-05-15 RX ADMIN — Medication 1 TABLET: at 08:53

## 2024-05-15 RX ADMIN — DULOXETINE HYDROCHLORIDE 60 MG: 60 CAPSULE, DELAYED RELEASE ORAL at 08:51

## 2024-05-15 RX ADMIN — SENNOSIDES 17.2 MG: 8.6 TABLET, COATED ORAL at 20:32

## 2024-05-15 RX ADMIN — AMLODIPINE BESYLATE 5 MG: 5 TABLET ORAL at 08:52

## 2024-05-15 RX ADMIN — THIAMINE HCL TAB 100 MG 100 MG: 100 TAB at 08:52

## 2024-05-15 RX ADMIN — SODIUM CHLORIDE, PRESERVATIVE FREE 10 ML: 5 INJECTION INTRAVENOUS at 08:52

## 2024-05-15 RX ADMIN — PRAVASTATIN SODIUM 40 MG: 20 TABLET ORAL at 08:51

## 2024-05-15 RX ADMIN — AMIODARONE HYDROCHLORIDE 200 MG: 200 TABLET ORAL at 08:52

## 2024-05-15 RX ADMIN — OXYCODONE HYDROCHLORIDE 10 MG: 10 TABLET ORAL at 06:09

## 2024-05-15 RX ADMIN — ACETAMINOPHEN 325MG 650 MG: 325 TABLET ORAL at 12:26

## 2024-05-15 RX ADMIN — SODIUM CHLORIDE, PRESERVATIVE FREE 10 ML: 5 INJECTION INTRAVENOUS at 20:33

## 2024-05-15 RX ADMIN — OXYCODONE HYDROCHLORIDE 10 MG: 10 TABLET ORAL at 18:32

## 2024-05-15 RX ADMIN — Medication 1 TABLET: at 08:51

## 2024-05-15 RX ADMIN — Medication 1 TABLET: at 20:33

## 2024-05-15 RX ADMIN — IPRATROPIUM BROMIDE AND ALBUTEROL SULFATE 1 DOSE: .5; 3 SOLUTION RESPIRATORY (INHALATION) at 21:58

## 2024-05-15 ASSESSMENT — PAIN DESCRIPTION - LOCATION
LOCATION: RIB CAGE
LOCATION: RIB CAGE
LOCATION: FLANK;CHEST
LOCATION: RIB CAGE

## 2024-05-15 ASSESSMENT — PAIN SCALES - GENERAL
PAINLEVEL_OUTOF10: 5
PAINLEVEL_OUTOF10: 6
PAINLEVEL_OUTOF10: 6
PAINLEVEL_OUTOF10: 0
PAINLEVEL_OUTOF10: 4
PAINLEVEL_OUTOF10: 6

## 2024-05-15 ASSESSMENT — PAIN DESCRIPTION - ORIENTATION
ORIENTATION: RIGHT;LEFT
ORIENTATION: RIGHT
ORIENTATION: RIGHT;LEFT
ORIENTATION: RIGHT

## 2024-05-15 ASSESSMENT — PAIN - FUNCTIONAL ASSESSMENT
PAIN_FUNCTIONAL_ASSESSMENT: PREVENTS OR INTERFERES SOME ACTIVE ACTIVITIES AND ADLS
PAIN_FUNCTIONAL_ASSESSMENT: ACTIVITIES ARE NOT PREVENTED

## 2024-05-15 ASSESSMENT — PAIN DESCRIPTION - FREQUENCY
FREQUENCY: CONTINUOUS
FREQUENCY: CONTINUOUS

## 2024-05-15 ASSESSMENT — PAIN DESCRIPTION - ONSET
ONSET: ON-GOING
ONSET: ON-GOING

## 2024-05-15 ASSESSMENT — PAIN DESCRIPTION - PAIN TYPE
TYPE: ACUTE PAIN
TYPE: ACUTE PAIN

## 2024-05-15 ASSESSMENT — PAIN DESCRIPTION - DESCRIPTORS
DESCRIPTORS: ACHING
DESCRIPTORS: STABBING
DESCRIPTORS: ACHING

## 2024-05-15 NOTE — CONSULTS
[facility].  Pulmonary, Critical Care, and Sleep Medicine    Initial Patient Consult    Name: Julien Avila MRN: [id number]   : 1945 Hospital: [facility]   Date: 5/15/2024        IMPRESSION:   Acute Hypoxic Respiratory Failure  - on NC, s/p bipap yesterday  Traumatic Hemothorax  - s/p chest tube- 1800cc out so far  Multiple Rib Fxs  - previous old ones with ORIF  - new ones from current fall  YESY  - on home CPAP- follows with [facility]     Patient Active Problem List   Diagnosis    Chest pain    Long term (current) use of anticoagulants    LVH (left ventricular hypertrophy)    Bipolar disorder (HCC)    Diverticulosis of colon    Osteoarthritis of right hip    Atrial fibrillation (HCC)    Near syncope    HTN (hypertension)    Cerebrovascular disease    SOB (shortness of breath)    Hypopotassemia    Acute respiratory failure with hypoxia (HCC)    Pleural effusion    Hemothorax on right    Obstructive sleep apnea    Closed fracture of multiple ribs of left side    Closed fracture of thoracic vertebra (HCC)    Normocytic anemia    Anxiety and depression      RECOMMENDATIONS:   Wean O2 as tolerated, may need to go on home O2  Pain control for rib Fxs  Chest tube management per CT surgery  Off bipap, continue home CPAP  Encourage pulmonary toilet and IS    Follows up with [facility]    Will sign off     Subjective:     This patient has been seen and evaluated at the request of Dr. beckham for hemothorax. Patient is a 78 y.o. male who presented initially to [facility] for a fall off a horse. He states he wasn't thrown off but \"asked to leave\". He was transferred here after he was found to have a pleural effusion and multiple rib fxs. He had a thoracentesis yesterday that was concerning for hemothorax. He was placed on bipap and moved to the ICU. CT surgery was consulted and a chest tube was placed by IR which drained 1400cc. He follows with [facility] and wears a CPAP at night.     He is currently doing

## 2024-05-16 ENCOUNTER — APPOINTMENT (OUTPATIENT)
Facility: HOSPITAL | Age: 79
DRG: 199 | End: 2024-05-16
Attending: STUDENT IN AN ORGANIZED HEALTH CARE EDUCATION/TRAINING PROGRAM
Payer: MEDICARE

## 2024-05-16 LAB
ANION GAP SERPL CALC-SCNC: 5 MMOL/L (ref 3–18)
BASOPHILS # BLD: 0.1 K/UL (ref 0–0.1)
BASOPHILS NFR BLD: 1 % (ref 0–2)
BUN SERPL-MCNC: 21 MG/DL (ref 7–18)
BUN/CREAT SERPL: 24 (ref 12–20)
CALCIUM SERPL-MCNC: 9.5 MG/DL (ref 8.5–10.1)
CHLORIDE SERPL-SCNC: 93 MMOL/L (ref 100–111)
CO2 SERPL-SCNC: 31 MMOL/L (ref 21–32)
CREAT SERPL-MCNC: 0.89 MG/DL (ref 0.6–1.3)
DIFFERENTIAL METHOD BLD: ABNORMAL
EOSINOPHIL # BLD: 0.2 K/UL (ref 0–0.4)
EOSINOPHIL NFR BLD: 2 % (ref 0–5)
ERYTHROCYTE [DISTWIDTH] IN BLOOD BY AUTOMATED COUNT: 12.2 % (ref 11.6–14.5)
GLUCOSE SERPL-MCNC: 115 MG/DL (ref 74–99)
HCT VFR BLD AUTO: 40.7 % (ref 36–48)
HGB BLD-MCNC: 13.5 G/DL (ref 13–16)
IMM GRANULOCYTES # BLD AUTO: 0.1 K/UL (ref 0–0.04)
IMM GRANULOCYTES NFR BLD AUTO: 1 % (ref 0–0.5)
LYMPHOCYTES # BLD: 1.3 K/UL (ref 0.9–3.6)
LYMPHOCYTES NFR BLD: 10 % (ref 21–52)
MAGNESIUM SERPL-MCNC: 2.7 MG/DL (ref 1.6–2.6)
MCH RBC QN AUTO: 31.6 PG (ref 24–34)
MCHC RBC AUTO-ENTMCNC: 33.2 G/DL (ref 31–37)
MCV RBC AUTO: 95.3 FL (ref 78–100)
MONOCYTES # BLD: 1.2 K/UL (ref 0.05–1.2)
MONOCYTES NFR BLD: 9 % (ref 3–10)
NEUTS SEG # BLD: 9.8 K/UL (ref 1.8–8)
NEUTS SEG NFR BLD: 77 % (ref 40–73)
NRBC # BLD: 0 K/UL (ref 0–0.01)
NRBC BLD-RTO: 0 PER 100 WBC
PLATELET # BLD AUTO: 366 K/UL (ref 135–420)
PMV BLD AUTO: 9.2 FL (ref 9.2–11.8)
POTASSIUM SERPL-SCNC: 3.6 MMOL/L (ref 3.5–5.5)
RBC # BLD AUTO: 4.27 M/UL (ref 4.35–5.65)
SODIUM SERPL-SCNC: 129 MMOL/L (ref 136–145)
WBC # BLD AUTO: 12.7 K/UL (ref 4.6–13.2)

## 2024-05-16 PROCEDURE — 99024 POSTOP FOLLOW-UP VISIT: CPT | Performed by: PHYSICIAN ASSISTANT

## 2024-05-16 PROCEDURE — 94640 AIRWAY INHALATION TREATMENT: CPT

## 2024-05-16 PROCEDURE — 97162 PT EVAL MOD COMPLEX 30 MIN: CPT

## 2024-05-16 PROCEDURE — 83735 ASSAY OF MAGNESIUM: CPT

## 2024-05-16 PROCEDURE — 6370000000 HC RX 637 (ALT 250 FOR IP): Performed by: STUDENT IN AN ORGANIZED HEALTH CARE EDUCATION/TRAINING PROGRAM

## 2024-05-16 PROCEDURE — 94761 N-INVAS EAR/PLS OXIMETRY MLT: CPT

## 2024-05-16 PROCEDURE — 1100000000 HC RM PRIVATE

## 2024-05-16 PROCEDURE — 99232 SBSQ HOSP IP/OBS MODERATE 35: CPT | Performed by: HOSPITALIST

## 2024-05-16 PROCEDURE — 2700000000 HC OXYGEN THERAPY PER DAY

## 2024-05-16 PROCEDURE — 94669 MECHANICAL CHEST WALL OSCILL: CPT

## 2024-05-16 PROCEDURE — 2580000003 HC RX 258: Performed by: STUDENT IN AN ORGANIZED HEALTH CARE EDUCATION/TRAINING PROGRAM

## 2024-05-16 PROCEDURE — 97116 GAIT TRAINING THERAPY: CPT

## 2024-05-16 PROCEDURE — 97166 OT EVAL MOD COMPLEX 45 MIN: CPT

## 2024-05-16 PROCEDURE — 6370000000 HC RX 637 (ALT 250 FOR IP): Performed by: HOSPITALIST

## 2024-05-16 PROCEDURE — 71045 X-RAY EXAM CHEST 1 VIEW: CPT

## 2024-05-16 PROCEDURE — 97530 THERAPEUTIC ACTIVITIES: CPT

## 2024-05-16 PROCEDURE — 94660 CPAP INITIATION&MGMT: CPT

## 2024-05-16 PROCEDURE — 6370000000 HC RX 637 (ALT 250 FOR IP): Performed by: PHYSICIAN ASSISTANT

## 2024-05-16 PROCEDURE — 85025 COMPLETE CBC W/AUTO DIFF WBC: CPT

## 2024-05-16 PROCEDURE — 97535 SELF CARE MNGMENT TRAINING: CPT

## 2024-05-16 PROCEDURE — 36415 COLL VENOUS BLD VENIPUNCTURE: CPT

## 2024-05-16 PROCEDURE — 80048 BASIC METABOLIC PNL TOTAL CA: CPT

## 2024-05-16 RX ORDER — IPRATROPIUM BROMIDE AND ALBUTEROL SULFATE 2.5; .5 MG/3ML; MG/3ML
1 SOLUTION RESPIRATORY (INHALATION)
Status: DISCONTINUED | OUTPATIENT
Start: 2024-05-16 | End: 2024-05-20 | Stop reason: HOSPADM

## 2024-05-16 RX ADMIN — AMIODARONE HYDROCHLORIDE 200 MG: 200 TABLET ORAL at 08:48

## 2024-05-16 RX ADMIN — Medication 1 TABLET: at 20:05

## 2024-05-16 RX ADMIN — PRAVASTATIN SODIUM 40 MG: 20 TABLET ORAL at 08:48

## 2024-05-16 RX ADMIN — ACETAMINOPHEN 325MG 650 MG: 325 TABLET ORAL at 14:28

## 2024-05-16 RX ADMIN — ACETAMINOPHEN 325MG 650 MG: 325 TABLET ORAL at 07:36

## 2024-05-16 RX ADMIN — Medication 1 TABLET: at 08:47

## 2024-05-16 RX ADMIN — DULOXETINE HYDROCHLORIDE 60 MG: 60 CAPSULE, DELAYED RELEASE ORAL at 08:47

## 2024-05-16 RX ADMIN — Medication 1 TABLET: at 08:48

## 2024-05-16 RX ADMIN — IPRATROPIUM BROMIDE AND ALBUTEROL SULFATE 1 DOSE: .5; 3 SOLUTION RESPIRATORY (INHALATION) at 04:44

## 2024-05-16 RX ADMIN — OXYCODONE HYDROCHLORIDE 10 MG: 10 TABLET ORAL at 01:47

## 2024-05-16 RX ADMIN — SODIUM CHLORIDE, PRESERVATIVE FREE 10 ML: 5 INJECTION INTRAVENOUS at 20:05

## 2024-05-16 RX ADMIN — SODIUM CHLORIDE, PRESERVATIVE FREE 10 ML: 5 INJECTION INTRAVENOUS at 08:48

## 2024-05-16 RX ADMIN — OXYCODONE HYDROCHLORIDE 10 MG: 10 TABLET ORAL at 12:04

## 2024-05-16 RX ADMIN — THIAMINE HCL TAB 100 MG 100 MG: 100 TAB at 08:48

## 2024-05-16 RX ADMIN — IPRATROPIUM BROMIDE AND ALBUTEROL SULFATE 1 DOSE: 2.5; .5 SOLUTION RESPIRATORY (INHALATION) at 16:40

## 2024-05-16 RX ADMIN — SENNOSIDES 17.2 MG: 8.6 TABLET, COATED ORAL at 20:05

## 2024-05-16 RX ADMIN — IPRATROPIUM BROMIDE AND ALBUTEROL SULFATE 1 DOSE: 2.5; .5 SOLUTION RESPIRATORY (INHALATION) at 12:02

## 2024-05-16 RX ADMIN — AMLODIPINE BESYLATE 5 MG: 5 TABLET ORAL at 08:47

## 2024-05-16 RX ADMIN — IPRATROPIUM BROMIDE AND ALBUTEROL SULFATE 1 DOSE: 2.5; .5 SOLUTION RESPIRATORY (INHALATION) at 20:26

## 2024-05-16 RX ADMIN — ACETAMINOPHEN 325MG 650 MG: 325 TABLET ORAL at 20:14

## 2024-05-16 ASSESSMENT — PAIN DESCRIPTION - LOCATION
LOCATION: CHEST
LOCATION: RIB CAGE
LOCATION: CHEST
LOCATION: RIB CAGE

## 2024-05-16 ASSESSMENT — PAIN DESCRIPTION - PAIN TYPE
TYPE: ACUTE PAIN

## 2024-05-16 ASSESSMENT — PAIN DESCRIPTION - DIRECTION: RADIATING_TOWARDS: LEFT

## 2024-05-16 ASSESSMENT — PAIN DESCRIPTION - ORIENTATION
ORIENTATION: RIGHT;LEFT
ORIENTATION: LEFT
ORIENTATION: RIGHT;LEFT
ORIENTATION: LEFT

## 2024-05-16 ASSESSMENT — PAIN DESCRIPTION - DESCRIPTORS
DESCRIPTORS: ACHING

## 2024-05-16 ASSESSMENT — PAIN SCALES - GENERAL
PAINLEVEL_OUTOF10: 0
PAINLEVEL_OUTOF10: 0
PAINLEVEL_OUTOF10: 2
PAINLEVEL_OUTOF10: 5
PAINLEVEL_OUTOF10: 4
PAINLEVEL_OUTOF10: 5
PAINLEVEL_OUTOF10: 4
PAINLEVEL_OUTOF10: 0
PAINLEVEL_OUTOF10: 2
PAINLEVEL_OUTOF10: 4
PAINLEVEL_OUTOF10: 8
PAINLEVEL_OUTOF10: 6
PAINLEVEL_OUTOF10: 4

## 2024-05-16 ASSESSMENT — PAIN DESCRIPTION - FREQUENCY
FREQUENCY: INTERMITTENT

## 2024-05-16 ASSESSMENT — PAIN DESCRIPTION - ONSET
ONSET: ON-GOING

## 2024-05-16 ASSESSMENT — PAIN - FUNCTIONAL ASSESSMENT
PAIN_FUNCTIONAL_ASSESSMENT: ACTIVITIES ARE NOT PREVENTED

## 2024-05-17 ENCOUNTER — APPOINTMENT (OUTPATIENT)
Facility: HOSPITAL | Age: 79
DRG: 199 | End: 2024-05-17
Attending: STUDENT IN AN ORGANIZED HEALTH CARE EDUCATION/TRAINING PROGRAM
Payer: MEDICARE

## 2024-05-17 LAB
ANION GAP SERPL CALC-SCNC: 5 MMOL/L (ref 3–18)
BASOPHILS # BLD: 0.1 K/UL (ref 0–0.1)
BASOPHILS NFR BLD: 1 % (ref 0–2)
BUN SERPL-MCNC: 15 MG/DL (ref 7–18)
BUN/CREAT SERPL: 19 (ref 12–20)
CALCIUM SERPL-MCNC: 9.3 MG/DL (ref 8.5–10.1)
CHLORIDE SERPL-SCNC: 93 MMOL/L (ref 100–111)
CO2 SERPL-SCNC: 33 MMOL/L (ref 21–32)
CREAT SERPL-MCNC: 0.81 MG/DL (ref 0.6–1.3)
DIFFERENTIAL METHOD BLD: ABNORMAL
EOSINOPHIL # BLD: 0.2 K/UL (ref 0–0.4)
EOSINOPHIL NFR BLD: 2 % (ref 0–5)
ERYTHROCYTE [DISTWIDTH] IN BLOOD BY AUTOMATED COUNT: 12.2 % (ref 11.6–14.5)
GLUCOSE SERPL-MCNC: 119 MG/DL (ref 74–99)
HCT VFR BLD AUTO: 37.1 % (ref 36–48)
HGB BLD-MCNC: 12.2 G/DL (ref 13–16)
IMM GRANULOCYTES # BLD AUTO: 0.1 K/UL (ref 0–0.04)
IMM GRANULOCYTES NFR BLD AUTO: 1 % (ref 0–0.5)
LYMPHOCYTES # BLD: 1.1 K/UL (ref 0.9–3.6)
LYMPHOCYTES NFR BLD: 10 % (ref 21–52)
MAGNESIUM SERPL-MCNC: 2.6 MG/DL (ref 1.6–2.6)
MCH RBC QN AUTO: 31.1 PG (ref 24–34)
MCHC RBC AUTO-ENTMCNC: 32.9 G/DL (ref 31–37)
MCV RBC AUTO: 94.6 FL (ref 78–100)
MONOCYTES # BLD: 0.9 K/UL (ref 0.05–1.2)
MONOCYTES NFR BLD: 9 % (ref 3–10)
NEUTS SEG # BLD: 8.2 K/UL (ref 1.8–8)
NEUTS SEG NFR BLD: 78 % (ref 40–73)
NRBC # BLD: 0 K/UL (ref 0–0.01)
NRBC BLD-RTO: 0 PER 100 WBC
PLATELET # BLD AUTO: 346 K/UL (ref 135–420)
PMV BLD AUTO: 9.3 FL (ref 9.2–11.8)
POTASSIUM SERPL-SCNC: 3.3 MMOL/L (ref 3.5–5.5)
RBC # BLD AUTO: 3.92 M/UL (ref 4.35–5.65)
SODIUM SERPL-SCNC: 131 MMOL/L (ref 136–145)
WBC # BLD AUTO: 10.5 K/UL (ref 4.6–13.2)

## 2024-05-17 PROCEDURE — 97535 SELF CARE MNGMENT TRAINING: CPT

## 2024-05-17 PROCEDURE — 2700000000 HC OXYGEN THERAPY PER DAY

## 2024-05-17 PROCEDURE — 94761 N-INVAS EAR/PLS OXIMETRY MLT: CPT

## 2024-05-17 PROCEDURE — 6370000000 HC RX 637 (ALT 250 FOR IP): Performed by: STUDENT IN AN ORGANIZED HEALTH CARE EDUCATION/TRAINING PROGRAM

## 2024-05-17 PROCEDURE — 80048 BASIC METABOLIC PNL TOTAL CA: CPT

## 2024-05-17 PROCEDURE — 83735 ASSAY OF MAGNESIUM: CPT

## 2024-05-17 PROCEDURE — 99232 SBSQ HOSP IP/OBS MODERATE 35: CPT | Performed by: HOSPITALIST

## 2024-05-17 PROCEDURE — 6370000000 HC RX 637 (ALT 250 FOR IP): Performed by: HOSPITALIST

## 2024-05-17 PROCEDURE — 97530 THERAPEUTIC ACTIVITIES: CPT

## 2024-05-17 PROCEDURE — 99024 POSTOP FOLLOW-UP VISIT: CPT | Performed by: PHYSICIAN ASSISTANT

## 2024-05-17 PROCEDURE — 6360000002 HC RX W HCPCS: Performed by: HOSPITALIST

## 2024-05-17 PROCEDURE — 6370000000 HC RX 637 (ALT 250 FOR IP): Performed by: PHYSICIAN ASSISTANT

## 2024-05-17 PROCEDURE — 6360000002 HC RX W HCPCS: Performed by: STUDENT IN AN ORGANIZED HEALTH CARE EDUCATION/TRAINING PROGRAM

## 2024-05-17 PROCEDURE — 85025 COMPLETE CBC W/AUTO DIFF WBC: CPT

## 2024-05-17 PROCEDURE — 71045 X-RAY EXAM CHEST 1 VIEW: CPT

## 2024-05-17 PROCEDURE — 1100000003 HC PRIVATE W/ TELEMETRY

## 2024-05-17 PROCEDURE — 94640 AIRWAY INHALATION TREATMENT: CPT

## 2024-05-17 PROCEDURE — 94660 CPAP INITIATION&MGMT: CPT

## 2024-05-17 PROCEDURE — 94669 MECHANICAL CHEST WALL OSCILL: CPT

## 2024-05-17 PROCEDURE — 2580000003 HC RX 258: Performed by: STUDENT IN AN ORGANIZED HEALTH CARE EDUCATION/TRAINING PROGRAM

## 2024-05-17 PROCEDURE — 36415 COLL VENOUS BLD VENIPUNCTURE: CPT

## 2024-05-17 RX ORDER — MULTIVITAMIN WITH IRON
1 TABLET ORAL DAILY
Status: DISCONTINUED | OUTPATIENT
Start: 2024-05-18 | End: 2024-05-20 | Stop reason: HOSPADM

## 2024-05-17 RX ORDER — ENOXAPARIN SODIUM 100 MG/ML
30 INJECTION SUBCUTANEOUS 2 TIMES DAILY
Status: DISCONTINUED | OUTPATIENT
Start: 2024-05-17 | End: 2024-05-20

## 2024-05-17 RX ADMIN — IPRATROPIUM BROMIDE AND ALBUTEROL SULFATE 1 DOSE: 2.5; .5 SOLUTION RESPIRATORY (INHALATION) at 19:30

## 2024-05-17 RX ADMIN — IPRATROPIUM BROMIDE AND ALBUTEROL SULFATE 1 DOSE: 2.5; .5 SOLUTION RESPIRATORY (INHALATION) at 08:04

## 2024-05-17 RX ADMIN — OXYCODONE HYDROCHLORIDE 10 MG: 10 TABLET ORAL at 09:13

## 2024-05-17 RX ADMIN — SODIUM CHLORIDE, PRESERVATIVE FREE 10 ML: 5 INJECTION INTRAVENOUS at 09:01

## 2024-05-17 RX ADMIN — ACETAMINOPHEN 325MG 650 MG: 325 TABLET ORAL at 12:49

## 2024-05-17 RX ADMIN — AMIODARONE HYDROCHLORIDE 200 MG: 200 TABLET ORAL at 08:49

## 2024-05-17 RX ADMIN — SENNOSIDES 17.2 MG: 8.6 TABLET, COATED ORAL at 21:45

## 2024-05-17 RX ADMIN — MORPHINE SULFATE 4 MG: 2 INJECTION, SOLUTION INTRAMUSCULAR; INTRAVENOUS at 12:49

## 2024-05-17 RX ADMIN — Medication 1 TABLET: at 08:48

## 2024-05-17 RX ADMIN — PRAVASTATIN SODIUM 40 MG: 20 TABLET ORAL at 08:50

## 2024-05-17 RX ADMIN — SODIUM CHLORIDE, PRESERVATIVE FREE 10 ML: 5 INJECTION INTRAVENOUS at 21:55

## 2024-05-17 RX ADMIN — AMLODIPINE BESYLATE 5 MG: 5 TABLET ORAL at 08:54

## 2024-05-17 RX ADMIN — Medication 1 TABLET: at 08:49

## 2024-05-17 RX ADMIN — ENOXAPARIN SODIUM 30 MG: 100 INJECTION SUBCUTANEOUS at 21:44

## 2024-05-17 RX ADMIN — IPRATROPIUM BROMIDE AND ALBUTEROL SULFATE 1 DOSE: 2.5; .5 SOLUTION RESPIRATORY (INHALATION) at 12:11

## 2024-05-17 RX ADMIN — THIAMINE HCL TAB 100 MG 100 MG: 100 TAB at 08:49

## 2024-05-17 RX ADMIN — IPRATROPIUM BROMIDE AND ALBUTEROL SULFATE 1 DOSE: 2.5; .5 SOLUTION RESPIRATORY (INHALATION) at 15:12

## 2024-05-17 RX ADMIN — Medication 1 TABLET: at 21:45

## 2024-05-17 RX ADMIN — POTASSIUM CHLORIDE 40 MEQ: 1500 TABLET, EXTENDED RELEASE ORAL at 08:49

## 2024-05-17 RX ADMIN — DULOXETINE HYDROCHLORIDE 60 MG: 60 CAPSULE, DELAYED RELEASE ORAL at 08:49

## 2024-05-17 RX ADMIN — OXYCODONE HYDROCHLORIDE 10 MG: 10 TABLET ORAL at 18:55

## 2024-05-17 ASSESSMENT — PAIN SCALES - GENERAL
PAINLEVEL_OUTOF10: 3
PAINLEVEL_OUTOF10: 7
PAINLEVEL_OUTOF10: 4
PAINLEVEL_OUTOF10: 6
PAINLEVEL_OUTOF10: 0
PAINLEVEL_OUTOF10: 0
PAINLEVEL_OUTOF10: 4

## 2024-05-17 ASSESSMENT — PAIN DESCRIPTION - FREQUENCY
FREQUENCY: INTERMITTENT
FREQUENCY: INTERMITTENT

## 2024-05-17 ASSESSMENT — PAIN DESCRIPTION - ORIENTATION
ORIENTATION: RIGHT;LEFT
ORIENTATION: RIGHT;LEFT

## 2024-05-17 ASSESSMENT — PAIN DESCRIPTION - LOCATION
LOCATION: BACK;BUTTOCKS
LOCATION: RIB CAGE
LOCATION: RIB CAGE

## 2024-05-17 ASSESSMENT — PAIN DESCRIPTION - PAIN TYPE
TYPE: ACUTE PAIN
TYPE: ACUTE PAIN

## 2024-05-17 ASSESSMENT — PAIN DESCRIPTION - DESCRIPTORS
DESCRIPTORS: ACHING;SORE;SPASM

## 2024-05-17 ASSESSMENT — PAIN - FUNCTIONAL ASSESSMENT
PAIN_FUNCTIONAL_ASSESSMENT: ACTIVITIES ARE NOT PREVENTED

## 2024-05-17 ASSESSMENT — PAIN DESCRIPTION - ONSET: ONSET: ON-GOING

## 2024-05-17 ASSESSMENT — PAIN DESCRIPTION - DIRECTION: RADIATING_TOWARDS: LEFT

## 2024-05-18 ENCOUNTER — APPOINTMENT (OUTPATIENT)
Facility: HOSPITAL | Age: 79
DRG: 199 | End: 2024-05-18
Attending: STUDENT IN AN ORGANIZED HEALTH CARE EDUCATION/TRAINING PROGRAM
Payer: MEDICARE

## 2024-05-18 LAB
ANION GAP SERPL CALC-SCNC: 7 MMOL/L (ref 3–18)
BACTERIA SPEC CULT: NORMAL
BASOPHILS # BLD: 0.1 K/UL (ref 0–0.1)
BASOPHILS NFR BLD: 1 % (ref 0–2)
BUN SERPL-MCNC: 11 MG/DL (ref 7–18)
BUN/CREAT SERPL: 13 (ref 12–20)
CALCIUM SERPL-MCNC: 8.9 MG/DL (ref 8.5–10.1)
CHLORIDE SERPL-SCNC: 94 MMOL/L (ref 100–111)
CO2 SERPL-SCNC: 31 MMOL/L (ref 21–32)
CREAT SERPL-MCNC: 0.88 MG/DL (ref 0.6–1.3)
DIFFERENTIAL METHOD BLD: ABNORMAL
EOSINOPHIL # BLD: 0.2 K/UL (ref 0–0.4)
EOSINOPHIL NFR BLD: 1 % (ref 0–5)
ERYTHROCYTE [DISTWIDTH] IN BLOOD BY AUTOMATED COUNT: 12.2 % (ref 11.6–14.5)
GLUCOSE SERPL-MCNC: 115 MG/DL (ref 74–99)
GRAM STN SPEC: NORMAL
HCT VFR BLD AUTO: 34.9 % (ref 36–48)
HGB BLD-MCNC: 11.6 G/DL (ref 13–16)
IMM GRANULOCYTES # BLD AUTO: 0.1 K/UL (ref 0–0.04)
IMM GRANULOCYTES NFR BLD AUTO: 1 % (ref 0–0.5)
LYMPHOCYTES # BLD: 1.4 K/UL (ref 0.9–3.6)
LYMPHOCYTES NFR BLD: 12 % (ref 21–52)
MAGNESIUM SERPL-MCNC: 2.4 MG/DL (ref 1.6–2.6)
MCH RBC QN AUTO: 31.4 PG (ref 24–34)
MCHC RBC AUTO-ENTMCNC: 33.2 G/DL (ref 31–37)
MCV RBC AUTO: 94.3 FL (ref 78–100)
MONOCYTES # BLD: 1.1 K/UL (ref 0.05–1.2)
MONOCYTES NFR BLD: 10 % (ref 3–10)
NEUTS SEG # BLD: 8.8 K/UL (ref 1.8–8)
NEUTS SEG NFR BLD: 76 % (ref 40–73)
NRBC # BLD: 0.02 K/UL (ref 0–0.01)
NRBC BLD-RTO: 0.2 PER 100 WBC
PLATELET # BLD AUTO: 406 K/UL (ref 135–420)
PMV BLD AUTO: 9.3 FL (ref 9.2–11.8)
POTASSIUM SERPL-SCNC: 3.5 MMOL/L (ref 3.5–5.5)
RBC # BLD AUTO: 3.7 M/UL (ref 4.35–5.65)
SERVICE CMNT-IMP: NORMAL
SODIUM SERPL-SCNC: 132 MMOL/L (ref 136–145)
WBC # BLD AUTO: 11.6 K/UL (ref 4.6–13.2)

## 2024-05-18 PROCEDURE — 6360000002 HC RX W HCPCS: Performed by: STUDENT IN AN ORGANIZED HEALTH CARE EDUCATION/TRAINING PROGRAM

## 2024-05-18 PROCEDURE — 94669 MECHANICAL CHEST WALL OSCILL: CPT

## 2024-05-18 PROCEDURE — 94640 AIRWAY INHALATION TREATMENT: CPT

## 2024-05-18 PROCEDURE — 71045 X-RAY EXAM CHEST 1 VIEW: CPT

## 2024-05-18 PROCEDURE — 85025 COMPLETE CBC W/AUTO DIFF WBC: CPT

## 2024-05-18 PROCEDURE — 6370000000 HC RX 637 (ALT 250 FOR IP): Performed by: STUDENT IN AN ORGANIZED HEALTH CARE EDUCATION/TRAINING PROGRAM

## 2024-05-18 PROCEDURE — 6360000002 HC RX W HCPCS: Performed by: HOSPITALIST

## 2024-05-18 PROCEDURE — 80048 BASIC METABOLIC PNL TOTAL CA: CPT

## 2024-05-18 PROCEDURE — 6370000000 HC RX 637 (ALT 250 FOR IP): Performed by: HOSPITALIST

## 2024-05-18 PROCEDURE — 99232 SBSQ HOSP IP/OBS MODERATE 35: CPT | Performed by: HOSPITALIST

## 2024-05-18 PROCEDURE — 36415 COLL VENOUS BLD VENIPUNCTURE: CPT

## 2024-05-18 PROCEDURE — 94660 CPAP INITIATION&MGMT: CPT

## 2024-05-18 PROCEDURE — 83735 ASSAY OF MAGNESIUM: CPT

## 2024-05-18 PROCEDURE — 6370000000 HC RX 637 (ALT 250 FOR IP): Performed by: PHYSICIAN ASSISTANT

## 2024-05-18 PROCEDURE — 1100000003 HC PRIVATE W/ TELEMETRY

## 2024-05-18 PROCEDURE — 2700000000 HC OXYGEN THERAPY PER DAY

## 2024-05-18 PROCEDURE — 94761 N-INVAS EAR/PLS OXIMETRY MLT: CPT

## 2024-05-18 PROCEDURE — 2580000003 HC RX 258: Performed by: STUDENT IN AN ORGANIZED HEALTH CARE EDUCATION/TRAINING PROGRAM

## 2024-05-18 RX ADMIN — THERA TABS 1 TABLET: TAB at 09:32

## 2024-05-18 RX ADMIN — IPRATROPIUM BROMIDE AND ALBUTEROL SULFATE 1 DOSE: 2.5; .5 SOLUTION RESPIRATORY (INHALATION) at 09:06

## 2024-05-18 RX ADMIN — OXYCODONE HYDROCHLORIDE 10 MG: 10 TABLET ORAL at 16:11

## 2024-05-18 RX ADMIN — ENOXAPARIN SODIUM 30 MG: 100 INJECTION SUBCUTANEOUS at 09:33

## 2024-05-18 RX ADMIN — AMLODIPINE BESYLATE 5 MG: 5 TABLET ORAL at 09:35

## 2024-05-18 RX ADMIN — IPRATROPIUM BROMIDE AND ALBUTEROL SULFATE 1 DOSE: 2.5; .5 SOLUTION RESPIRATORY (INHALATION) at 12:15

## 2024-05-18 RX ADMIN — THIAMINE HCL TAB 100 MG 100 MG: 100 TAB at 09:32

## 2024-05-18 RX ADMIN — IPRATROPIUM BROMIDE AND ALBUTEROL SULFATE 1 DOSE: 2.5; .5 SOLUTION RESPIRATORY (INHALATION) at 20:37

## 2024-05-18 RX ADMIN — Medication 1 TABLET: at 09:33

## 2024-05-18 RX ADMIN — SODIUM CHLORIDE, PRESERVATIVE FREE 10 ML: 5 INJECTION INTRAVENOUS at 09:37

## 2024-05-18 RX ADMIN — PRAVASTATIN SODIUM 40 MG: 20 TABLET ORAL at 09:30

## 2024-05-18 RX ADMIN — Medication 1 TABLET: at 21:48

## 2024-05-18 RX ADMIN — GUAIFENESIN AND DEXTROMETHORPHAN 5 ML: 100; 10 SYRUP ORAL at 21:48

## 2024-05-18 RX ADMIN — GLYCERIN 2 G: 2 SUPPOSITORY RECTAL at 12:47

## 2024-05-18 RX ADMIN — OXYCODONE HYDROCHLORIDE 10 MG: 10 TABLET ORAL at 02:22

## 2024-05-18 RX ADMIN — SENNOSIDES 17.2 MG: 8.6 TABLET, COATED ORAL at 21:51

## 2024-05-18 RX ADMIN — AMIODARONE HYDROCHLORIDE 200 MG: 200 TABLET ORAL at 09:32

## 2024-05-18 RX ADMIN — ENOXAPARIN SODIUM 30 MG: 100 INJECTION SUBCUTANEOUS at 21:00

## 2024-05-18 RX ADMIN — SODIUM CHLORIDE, PRESERVATIVE FREE 10 ML: 5 INJECTION INTRAVENOUS at 21:51

## 2024-05-18 RX ADMIN — MORPHINE SULFATE 4 MG: 2 INJECTION, SOLUTION INTRAMUSCULAR; INTRAVENOUS at 21:49

## 2024-05-18 RX ADMIN — IPRATROPIUM BROMIDE AND ALBUTEROL SULFATE 1 DOSE: 2.5; .5 SOLUTION RESPIRATORY (INHALATION) at 16:06

## 2024-05-18 RX ADMIN — DULOXETINE HYDROCHLORIDE 60 MG: 60 CAPSULE, DELAYED RELEASE ORAL at 09:30

## 2024-05-18 RX ADMIN — ACETAMINOPHEN 325MG 650 MG: 325 TABLET ORAL at 09:35

## 2024-05-18 ASSESSMENT — PAIN SCALES - GENERAL
PAINLEVEL_OUTOF10: 0
PAINLEVEL_OUTOF10: 4
PAINLEVEL_OUTOF10: 0
PAINLEVEL_OUTOF10: 0
PAINLEVEL_OUTOF10: 2
PAINLEVEL_OUTOF10: 0
PAINLEVEL_OUTOF10: 4
PAINLEVEL_OUTOF10: 2
PAINLEVEL_OUTOF10: 2
PAINLEVEL_OUTOF10: 0
PAINLEVEL_OUTOF10: 2
PAINLEVEL_OUTOF10: 7

## 2024-05-18 ASSESSMENT — PAIN DESCRIPTION - FREQUENCY
FREQUENCY: INTERMITTENT

## 2024-05-18 ASSESSMENT — PAIN - FUNCTIONAL ASSESSMENT
PAIN_FUNCTIONAL_ASSESSMENT: ACTIVITIES ARE NOT PREVENTED
PAIN_FUNCTIONAL_ASSESSMENT: PREVENTS OR INTERFERES SOME ACTIVE ACTIVITIES AND ADLS

## 2024-05-18 ASSESSMENT — PAIN DESCRIPTION - DESCRIPTORS
DESCRIPTORS: ACHING
DESCRIPTORS: DULL
DESCRIPTORS: ACHING

## 2024-05-18 ASSESSMENT — PAIN DESCRIPTION - ONSET
ONSET: GRADUAL

## 2024-05-18 ASSESSMENT — PAIN DESCRIPTION - ORIENTATION
ORIENTATION: ANTERIOR;MID
ORIENTATION: LOWER
ORIENTATION: LOWER;MID
ORIENTATION: MID

## 2024-05-18 ASSESSMENT — PAIN DESCRIPTION - LOCATION
LOCATION: BACK
LOCATION: FLANK
LOCATION: BACK;BUTTOCKS
LOCATION: ABDOMEN
LOCATION: ABDOMEN

## 2024-05-18 ASSESSMENT — PAIN SCALES - WONG BAKER: WONGBAKER_NUMERICALRESPONSE: NO HURT

## 2024-05-18 ASSESSMENT — PAIN DESCRIPTION - PAIN TYPE
TYPE: ACUTE PAIN

## 2024-05-19 ENCOUNTER — APPOINTMENT (OUTPATIENT)
Facility: HOSPITAL | Age: 79
DRG: 199 | End: 2024-05-19
Attending: STUDENT IN AN ORGANIZED HEALTH CARE EDUCATION/TRAINING PROGRAM
Payer: MEDICARE

## 2024-05-19 PROCEDURE — 94640 AIRWAY INHALATION TREATMENT: CPT

## 2024-05-19 PROCEDURE — 6360000002 HC RX W HCPCS: Performed by: HOSPITALIST

## 2024-05-19 PROCEDURE — 6370000000 HC RX 637 (ALT 250 FOR IP): Performed by: HOSPITALIST

## 2024-05-19 PROCEDURE — 94761 N-INVAS EAR/PLS OXIMETRY MLT: CPT

## 2024-05-19 PROCEDURE — 94669 MECHANICAL CHEST WALL OSCILL: CPT

## 2024-05-19 PROCEDURE — 6370000000 HC RX 637 (ALT 250 FOR IP): Performed by: STUDENT IN AN ORGANIZED HEALTH CARE EDUCATION/TRAINING PROGRAM

## 2024-05-19 PROCEDURE — 2580000003 HC RX 258: Performed by: STUDENT IN AN ORGANIZED HEALTH CARE EDUCATION/TRAINING PROGRAM

## 2024-05-19 PROCEDURE — 99232 SBSQ HOSP IP/OBS MODERATE 35: CPT | Performed by: HOSPITALIST

## 2024-05-19 PROCEDURE — 6360000002 HC RX W HCPCS: Performed by: STUDENT IN AN ORGANIZED HEALTH CARE EDUCATION/TRAINING PROGRAM

## 2024-05-19 PROCEDURE — 2700000000 HC OXYGEN THERAPY PER DAY

## 2024-05-19 PROCEDURE — 94660 CPAP INITIATION&MGMT: CPT

## 2024-05-19 PROCEDURE — 71045 X-RAY EXAM CHEST 1 VIEW: CPT

## 2024-05-19 PROCEDURE — 6370000000 HC RX 637 (ALT 250 FOR IP): Performed by: PHYSICIAN ASSISTANT

## 2024-05-19 PROCEDURE — 1100000003 HC PRIVATE W/ TELEMETRY

## 2024-05-19 RX ADMIN — OXYCODONE HYDROCHLORIDE 10 MG: 10 TABLET ORAL at 18:13

## 2024-05-19 RX ADMIN — SENNOSIDES 17.2 MG: 8.6 TABLET, COATED ORAL at 21:42

## 2024-05-19 RX ADMIN — Medication 1 TABLET: at 09:23

## 2024-05-19 RX ADMIN — DULOXETINE HYDROCHLORIDE 60 MG: 60 CAPSULE, DELAYED RELEASE ORAL at 09:24

## 2024-05-19 RX ADMIN — IPRATROPIUM BROMIDE AND ALBUTEROL SULFATE 1 DOSE: 2.5; .5 SOLUTION RESPIRATORY (INHALATION) at 08:34

## 2024-05-19 RX ADMIN — OXYCODONE HYDROCHLORIDE 10 MG: 10 TABLET ORAL at 03:58

## 2024-05-19 RX ADMIN — THIAMINE HCL TAB 100 MG 100 MG: 100 TAB at 09:24

## 2024-05-19 RX ADMIN — MORPHINE SULFATE 4 MG: 2 INJECTION, SOLUTION INTRAMUSCULAR; INTRAVENOUS at 09:42

## 2024-05-19 RX ADMIN — THERA TABS 1 TABLET: TAB at 09:24

## 2024-05-19 RX ADMIN — ENOXAPARIN SODIUM 30 MG: 100 INJECTION SUBCUTANEOUS at 21:42

## 2024-05-19 RX ADMIN — SODIUM CHLORIDE, PRESERVATIVE FREE 10 ML: 5 INJECTION INTRAVENOUS at 09:25

## 2024-05-19 RX ADMIN — AMLODIPINE BESYLATE 5 MG: 5 TABLET ORAL at 09:24

## 2024-05-19 RX ADMIN — PRAVASTATIN SODIUM 40 MG: 20 TABLET ORAL at 09:23

## 2024-05-19 RX ADMIN — IPRATROPIUM BROMIDE AND ALBUTEROL SULFATE 1 DOSE: 2.5; .5 SOLUTION RESPIRATORY (INHALATION) at 14:17

## 2024-05-19 RX ADMIN — Medication 1 TABLET: at 21:42

## 2024-05-19 RX ADMIN — ENOXAPARIN SODIUM 30 MG: 100 INJECTION SUBCUTANEOUS at 09:24

## 2024-05-19 RX ADMIN — SODIUM CHLORIDE, PRESERVATIVE FREE 10 ML: 5 INJECTION INTRAVENOUS at 21:45

## 2024-05-19 RX ADMIN — AMIODARONE HYDROCHLORIDE 200 MG: 200 TABLET ORAL at 09:24

## 2024-05-19 RX ADMIN — IPRATROPIUM BROMIDE AND ALBUTEROL SULFATE 1 DOSE: 2.5; .5 SOLUTION RESPIRATORY (INHALATION) at 20:42

## 2024-05-19 ASSESSMENT — PAIN SCALES - GENERAL
PAINLEVEL_OUTOF10: 0
PAINLEVEL_OUTOF10: 8
PAINLEVEL_OUTOF10: 4
PAINLEVEL_OUTOF10: 0
PAINLEVEL_OUTOF10: 0
PAINLEVEL_OUTOF10: 2
PAINLEVEL_OUTOF10: 0
PAINLEVEL_OUTOF10: 0
PAINLEVEL_OUTOF10: 2
PAINLEVEL_OUTOF10: 2

## 2024-05-19 ASSESSMENT — PAIN DESCRIPTION - DESCRIPTORS
DESCRIPTORS: ACHING

## 2024-05-19 ASSESSMENT — PAIN DESCRIPTION - FREQUENCY
FREQUENCY: INTERMITTENT
FREQUENCY: INTERMITTENT

## 2024-05-19 ASSESSMENT — PAIN - FUNCTIONAL ASSESSMENT
PAIN_FUNCTIONAL_ASSESSMENT: ACTIVITIES ARE NOT PREVENTED
PAIN_FUNCTIONAL_ASSESSMENT: PREVENTS OR INTERFERES SOME ACTIVE ACTIVITIES AND ADLS
PAIN_FUNCTIONAL_ASSESSMENT: ACTIVITIES ARE NOT PREVENTED

## 2024-05-19 ASSESSMENT — PAIN DESCRIPTION - ORIENTATION
ORIENTATION: RIGHT;LEFT
ORIENTATION: OTHER (COMMENT)
ORIENTATION: MID;RIGHT
ORIENTATION: LEFT

## 2024-05-19 ASSESSMENT — PAIN DESCRIPTION - LOCATION
LOCATION: BUTTOCKS
LOCATION: GENERALIZED
LOCATION: FLANK
LOCATION: BACK

## 2024-05-19 ASSESSMENT — PAIN DESCRIPTION - PAIN TYPE
TYPE: ACUTE PAIN
TYPE: ACUTE PAIN

## 2024-05-19 ASSESSMENT — PAIN DESCRIPTION - DIRECTION: RADIATING_TOWARDS: RADIATING

## 2024-05-19 ASSESSMENT — PAIN DESCRIPTION - ONSET
ONSET: GRADUAL
ONSET: GRADUAL

## 2024-05-19 ASSESSMENT — PAIN SCALES - WONG BAKER: WONGBAKER_NUMERICALRESPONSE: NO HURT

## 2024-05-20 ENCOUNTER — HOSPITAL ENCOUNTER (INPATIENT)
Facility: HOSPITAL | Age: 79
DRG: 186 | End: 2024-05-20
Attending: EMERGENCY MEDICINE | Admitting: EMERGENCY MEDICINE
Payer: MEDICARE

## 2024-05-20 ENCOUNTER — APPOINTMENT (OUTPATIENT)
Facility: HOSPITAL | Age: 79
DRG: 199 | End: 2024-05-20
Attending: STUDENT IN AN ORGANIZED HEALTH CARE EDUCATION/TRAINING PROGRAM
Payer: MEDICARE

## 2024-05-20 VITALS
HEIGHT: 69 IN | TEMPERATURE: 98.2 F | SYSTOLIC BLOOD PRESSURE: 133 MMHG | BODY MASS INDEX: 37.89 KG/M2 | OXYGEN SATURATION: 96 % | WEIGHT: 255.8 LBS | DIASTOLIC BLOOD PRESSURE: 88 MMHG | RESPIRATION RATE: 19 BRPM | HEART RATE: 79 BPM

## 2024-05-20 DIAGNOSIS — S22.42XA CLOSED FRACTURE OF MULTIPLE RIBS OF LEFT SIDE, INITIAL ENCOUNTER: Primary | ICD-10-CM

## 2024-05-20 DIAGNOSIS — S22.059A CLOSED FRACTURE OF SIXTH THORACIC VERTEBRA, UNSPECIFIED FRACTURE MORPHOLOGY, INITIAL ENCOUNTER (HCC): ICD-10-CM

## 2024-05-20 PROCEDURE — 6370000000 HC RX 637 (ALT 250 FOR IP): Performed by: HOSPITALIST

## 2024-05-20 PROCEDURE — 94761 N-INVAS EAR/PLS OXIMETRY MLT: CPT

## 2024-05-20 PROCEDURE — 71045 X-RAY EXAM CHEST 1 VIEW: CPT

## 2024-05-20 PROCEDURE — 2580000003 HC RX 258: Performed by: STUDENT IN AN ORGANIZED HEALTH CARE EDUCATION/TRAINING PROGRAM

## 2024-05-20 PROCEDURE — 6370000000 HC RX 637 (ALT 250 FOR IP): Performed by: PHYSICIAN ASSISTANT

## 2024-05-20 PROCEDURE — 97535 SELF CARE MNGMENT TRAINING: CPT

## 2024-05-20 PROCEDURE — 1180000000 HC REHAB R&B

## 2024-05-20 PROCEDURE — 94660 CPAP INITIATION&MGMT: CPT

## 2024-05-20 PROCEDURE — 6360000002 HC RX W HCPCS: Performed by: HOSPITALIST

## 2024-05-20 PROCEDURE — 2700000000 HC OXYGEN THERAPY PER DAY

## 2024-05-20 PROCEDURE — 97530 THERAPEUTIC ACTIVITIES: CPT

## 2024-05-20 PROCEDURE — 99239 HOSP IP/OBS DSCHRG MGMT >30: CPT | Performed by: STUDENT IN AN ORGANIZED HEALTH CARE EDUCATION/TRAINING PROGRAM

## 2024-05-20 PROCEDURE — 6370000000 HC RX 637 (ALT 250 FOR IP): Performed by: STUDENT IN AN ORGANIZED HEALTH CARE EDUCATION/TRAINING PROGRAM

## 2024-05-20 PROCEDURE — 97110 THERAPEUTIC EXERCISES: CPT

## 2024-05-20 PROCEDURE — 97116 GAIT TRAINING THERAPY: CPT

## 2024-05-20 PROCEDURE — 94640 AIRWAY INHALATION TREATMENT: CPT

## 2024-05-20 PROCEDURE — 6370000000 HC RX 637 (ALT 250 FOR IP): Performed by: EMERGENCY MEDICINE

## 2024-05-20 RX ORDER — GAUZE BANDAGE 2" X 2"
100 BANDAGE TOPICAL DAILY
Status: DISCONTINUED | OUTPATIENT
Start: 2024-05-21 | End: 2024-05-31 | Stop reason: HOSPADM

## 2024-05-20 RX ORDER — AMIODARONE HYDROCHLORIDE 200 MG/1
200 TABLET ORAL DAILY
Status: DISCONTINUED | OUTPATIENT
Start: 2024-05-21 | End: 2024-05-31 | Stop reason: HOSPADM

## 2024-05-20 RX ORDER — DULOXETIN HYDROCHLORIDE 30 MG/1
60 CAPSULE, DELAYED RELEASE ORAL DAILY
Status: DISCONTINUED | OUTPATIENT
Start: 2024-05-21 | End: 2024-05-31 | Stop reason: HOSPADM

## 2024-05-20 RX ORDER — VITAMIN B COMPLEX
1000 TABLET ORAL DAILY
Status: DISCONTINUED | OUTPATIENT
Start: 2024-05-21 | End: 2024-05-31 | Stop reason: HOSPADM

## 2024-05-20 RX ORDER — AMLODIPINE BESYLATE 5 MG/1
5 TABLET ORAL DAILY
Qty: 30 TABLET | Refills: 3 | Status: ON HOLD | OUTPATIENT
Start: 2024-05-21

## 2024-05-20 RX ORDER — POLYETHYLENE GLYCOL 3350 17 G/17G
17 POWDER, FOR SOLUTION ORAL DAILY PRN
Status: DISCONTINUED | OUTPATIENT
Start: 2024-05-20 | End: 2024-05-31 | Stop reason: HOSPADM

## 2024-05-20 RX ORDER — OXYCODONE HYDROCHLORIDE 10 MG/1
10 TABLET ORAL EVERY 6 HOURS PRN
Qty: 12 TABLET | Refills: 0 | Status: ON HOLD
Start: 2024-05-20 | End: 2024-05-23

## 2024-05-20 RX ORDER — SENNOSIDES A AND B 8.6 MG/1
2 TABLET, FILM COATED ORAL NIGHTLY
Status: DISCONTINUED | OUTPATIENT
Start: 2024-05-20 | End: 2024-05-21

## 2024-05-20 RX ORDER — PRAVASTATIN SODIUM 20 MG
40 TABLET ORAL DAILY
Status: DISCONTINUED | OUTPATIENT
Start: 2024-05-21 | End: 2024-05-31 | Stop reason: HOSPADM

## 2024-05-20 RX ORDER — NALOXONE HYDROCHLORIDE 0.4 MG/ML
0.4 INJECTION, SOLUTION INTRAMUSCULAR; INTRAVENOUS; SUBCUTANEOUS PRN
Status: DISCONTINUED | OUTPATIENT
Start: 2024-05-20 | End: 2024-05-31 | Stop reason: HOSPADM

## 2024-05-20 RX ORDER — AMLODIPINE BESYLATE 5 MG/1
5 TABLET ORAL DAILY
Status: DISCONTINUED | OUTPATIENT
Start: 2024-05-21 | End: 2024-05-31 | Stop reason: HOSPADM

## 2024-05-20 RX ORDER — SENNOSIDES A AND B 8.6 MG/1
2 TABLET, FILM COATED ORAL NIGHTLY
Qty: 60 TABLET | Refills: 0 | Status: ON HOLD
Start: 2024-05-20 | End: 2024-06-19

## 2024-05-20 RX ORDER — IPRATROPIUM BROMIDE AND ALBUTEROL SULFATE 2.5; .5 MG/3ML; MG/3ML
1 SOLUTION RESPIRATORY (INHALATION) EVERY 4 HOURS PRN
Status: DISCONTINUED | OUTPATIENT
Start: 2024-05-20 | End: 2024-05-31 | Stop reason: HOSPADM

## 2024-05-20 RX ORDER — ACETAMINOPHEN 325 MG/1
650 TABLET ORAL EVERY 6 HOURS PRN
Status: DISCONTINUED | OUTPATIENT
Start: 2024-05-20 | End: 2024-05-31 | Stop reason: HOSPADM

## 2024-05-20 RX ORDER — MULTIVITAMIN WITH IRON
1 TABLET ORAL DAILY
Status: DISCONTINUED | OUTPATIENT
Start: 2024-05-21 | End: 2024-05-31 | Stop reason: HOSPADM

## 2024-05-20 RX ORDER — OXYCODONE HYDROCHLORIDE 10 MG/1
10 TABLET ORAL EVERY 6 HOURS PRN
Status: DISCONTINUED | OUTPATIENT
Start: 2024-05-20 | End: 2024-05-31 | Stop reason: HOSPADM

## 2024-05-20 RX ADMIN — THERA TABS 1 TABLET: TAB at 10:06

## 2024-05-20 RX ADMIN — IPRATROPIUM BROMIDE AND ALBUTEROL SULFATE 1 DOSE: 2.5; .5 SOLUTION RESPIRATORY (INHALATION) at 16:01

## 2024-05-20 RX ADMIN — OXYCODONE HYDROCHLORIDE 10 MG: 10 TABLET ORAL at 00:28

## 2024-05-20 RX ADMIN — ENOXAPARIN SODIUM 30 MG: 100 INJECTION SUBCUTANEOUS at 10:06

## 2024-05-20 RX ADMIN — IPRATROPIUM BROMIDE AND ALBUTEROL SULFATE 1 DOSE: 2.5; .5 SOLUTION RESPIRATORY (INHALATION) at 11:43

## 2024-05-20 RX ADMIN — DULOXETINE HYDROCHLORIDE 60 MG: 60 CAPSULE, DELAYED RELEASE ORAL at 09:59

## 2024-05-20 RX ADMIN — AMIODARONE HYDROCHLORIDE 200 MG: 200 TABLET ORAL at 09:59

## 2024-05-20 RX ADMIN — SODIUM CHLORIDE, PRESERVATIVE FREE 10 ML: 5 INJECTION INTRAVENOUS at 10:09

## 2024-05-20 RX ADMIN — ACETAMINOPHEN 325MG 650 MG: 325 TABLET ORAL at 17:50

## 2024-05-20 RX ADMIN — THIAMINE HCL TAB 100 MG 100 MG: 100 TAB at 10:05

## 2024-05-20 RX ADMIN — OXYCODONE HYDROCHLORIDE 10 MG: 10 TABLET ORAL at 13:23

## 2024-05-20 RX ADMIN — PRAVASTATIN SODIUM 40 MG: 20 TABLET ORAL at 09:59

## 2024-05-20 RX ADMIN — SENNOSIDES 17.2 MG: 8.6 TABLET, FILM COATED ORAL at 21:12

## 2024-05-20 RX ADMIN — Medication 1 TABLET: at 10:06

## 2024-05-20 RX ADMIN — RIVAROXABAN 20 MG: 20 TABLET, FILM COATED ORAL at 17:51

## 2024-05-20 RX ADMIN — IPRATROPIUM BROMIDE AND ALBUTEROL SULFATE 1 DOSE: 2.5; .5 SOLUTION RESPIRATORY (INHALATION) at 07:36

## 2024-05-20 ASSESSMENT — PAIN SCALES - GENERAL
PAINLEVEL_OUTOF10: 0
PAINLEVEL_OUTOF10: 3
PAINLEVEL_OUTOF10: 3
PAINLEVEL_OUTOF10: 5
PAINLEVEL_OUTOF10: 5
PAINLEVEL_OUTOF10: 4
PAINLEVEL_OUTOF10: 2
PAINLEVEL_OUTOF10: 2

## 2024-05-20 ASSESSMENT — PAIN DESCRIPTION - PAIN TYPE
TYPE: ACUTE PAIN

## 2024-05-20 ASSESSMENT — PAIN DESCRIPTION - ONSET
ONSET: ON-GOING
ONSET: GRADUAL
ONSET: GRADUAL

## 2024-05-20 ASSESSMENT — PAIN DESCRIPTION - ORIENTATION
ORIENTATION: MID
ORIENTATION: MID;LOWER
ORIENTATION: MID
ORIENTATION: OTHER (COMMENT)

## 2024-05-20 ASSESSMENT — PAIN DESCRIPTION - DIRECTION: RADIATING_TOWARDS: NOT RADIATING

## 2024-05-20 ASSESSMENT — PAIN - FUNCTIONAL ASSESSMENT
PAIN_FUNCTIONAL_ASSESSMENT: PREVENTS OR INTERFERES SOME ACTIVE ACTIVITIES AND ADLS
PAIN_FUNCTIONAL_ASSESSMENT: PREVENTS OR INTERFERES SOME ACTIVE ACTIVITIES AND ADLS
PAIN_FUNCTIONAL_ASSESSMENT: ACTIVITIES ARE NOT PREVENTED

## 2024-05-20 ASSESSMENT — PAIN DESCRIPTION - LOCATION
LOCATION: BACK
LOCATION: BACK
LOCATION: GENERALIZED
LOCATION: BACK

## 2024-05-20 ASSESSMENT — PAIN DESCRIPTION - FREQUENCY
FREQUENCY: INTERMITTENT

## 2024-05-20 ASSESSMENT — PAIN DESCRIPTION - DESCRIPTORS
DESCRIPTORS: DISCOMFORT;DULL
DESCRIPTORS: ACHING;DULL
DESCRIPTORS: ACHING

## 2024-05-20 ASSESSMENT — PAIN SCALES - WONG BAKER: WONGBAKER_NUMERICALRESPONSE: NO HURT

## 2024-05-20 NOTE — DISCHARGE SUMMARY
Spoke to the patient's  Hardeep Brooke over the phone regarding the results of RUQ ultrasound 
8.6 MG tablet            Activity: activity as tolerated    Diet: regular diet    Wound Care: keep wound clean and dry    Follow-up: with PCP, Tommie Gillespie MD in 7-10days      Minutes spent on discharge: >30 minutes spent coordinating this discharge (review instructions/follow-up, prescriptions, preparing report for sign off)

## 2024-05-20 NOTE — PROGRESS NOTES
Interventional Radiology Progress Note    Patient: Julien Avila               Sex: male          DOA: 5/13/2024       YOB: 1945      Age:  78 y.o.        LOS: 2 days       Assessment/Plan     Patient is POD#1 s/p right chest tube placement by Dr. Pichardo.     1.  Continue management per primary team.    2. Chest tube management per cardiothoracic surgery    From an IR standpoint, the patient is progressing appropriately without any apparent complications.     Subjective:     The patient endorses mild discomfort at the chest tube site. Reports some shortness of breath but states has improved since yesterday.     The patient denies N/V, HA, dizziness, fever, chills, abdominal pain, or flank pain.     Objective:      Visit Vitals  /72   Pulse 67   Temp 97.5 °F (36.4 °C) (Axillary)   Resp 19   Ht 1.753 m (5' 9\")   Wt 117.8 kg (259 lb 12.8 oz)   SpO2 95%   BMI 38.37 kg/m²       Recent images:  Procedure images are available for review in PACS    Interval history:  Hemoglobin 12.4 today from 13.2 yesterday  1820 ml output from right chest tube in last 24 hours    Physical Exam:  Constitutional: NAD. A&Ox4.  Respiratory: NC 4L O2, Normal respiratory effort. Symmetrical rise and fall of chest.    Cardiovascular: Regular rate.   Gastrointestinal: Soft, NT, ND.  Procedure site: CDI dressing 24 Fr thalquick right chest tube    Thank you,  CHIN Paris  [phone]  
    Pharmacist Review and Automatic Dose Adjustment of Prophylactic Enoxaparin    The reviewing pharmacist has made an adjustment to the ordered enoxaparin dose or converted to UFH per the approved [facility] protocol and table as identified below.        Julien Avila is a 78 y.o. male.     Recent Labs     05/15/24  0455 05/16/24  0506 05/17/24  0553   CREATININE 0.89 0.89 0.81       Estimated Creatinine Clearance: 97 mL/min (based on SCr of 0.81 mg/dL).    Height:   Ht Readings from Last 1 Encounters:   05/13/24 1.753 m (5' 9\")     Weight:  Wt Readings from Last 1 Encounters:   05/17/24 116 kg (255 lb 12.8 oz)           Plan: Based upon the patient's weight and renal function, the ordered enoxaparin dose of enoxaparin 40 mg daily has been changed/converted to enoxaparin 30 mg BID.      Thank you,  KENN JACKSON, [facility]  
   05/14/24 0100   NIV Type   $NIV $Daily Charge   Ventilator ID [id number]   Suction Setup and Functional Yes   NIV Started/Stopped On   Equipment Type V60   Mode CPAP   Mask Type Full face mask   Mask Size Large   Assessment   Pulse 86   Respirations 25   SpO2 93 %   Settings/Measurements   PIP Observed 10 cm H20   CPAP/EPAP 8 cmH2O   Vt (Measured) 441 mL   Rate Ordered 8   FiO2  40 %   I Time/ I Time % 1 s   Minute Volume (L/min) 11 Liters   Mask Leak (lpm) 50 lpm   Patient's Home Machine No   Alarm Settings   Alarms On Y   Low Pressure (cmH2O) 8 cmH2O   High Pressure (cmH2O) 40 cmH2O   Apnea (secs) 20 secs   RR Low (bpm) 8   RR High (bpm) 40 br/min       
   05/18/24 2248   NIV Type   NIV Started/Stopped On   Equipment Type V60   Mode CPAP   Mask Type Full face mask   Mask Size Medium   Assessment   Respirations 17   Comfort Level Fair   Using Accessory Muscles No   Mask Compliance Fair   Skin Assessment Clean, dry, & intact   Settings/Measurements   PIP Observed 8 cm H20   CPAP/EPAP 10 cmH2O   Vt (Measured) 415 mL   Rate Ordered 8   FiO2  40 %   Minute Volume (L/min) 9.8 Liters   Mask Leak (lpm) 44 lpm  (Facial hair)   Patient's Home Machine No   Alarm Settings   Alarms On Y   Low Pressure (cmH2O) 8 cmH2O   High Pressure (cmH2O) 40 cmH2O   Delay Alarm 20 sec(s)   RR Low (bpm) 8   RR High (bpm) 40 br/min       
   05/19/24 1417   Treatment   Treatment Type N   $Treatment Type $Inhaled Therapy/Meds   Medications Albuterol/Ipratropium   Pre-Tx Pulse 75   Pre-Tx Resps 18   Breath Sounds Pre-Tx ELICIA Diminished   Breath Sounds Pre-Tx LLL Diminished   Breath Sounds Pre-Tx RUL Diminished   Breath Sounds Pre-Tx RML Diminished   Breath Sounds Pre-Tx RLL Diminished   Breath Sounds Post-Tx ELICIA Diminished   Breath Sounds Post-Tx LLL Diminished   Breath Sounds Post-Tx RUL Diminished   Breath Sounds Post-Tx RML Diminished   Breath Sounds Post-Tx RLL Diminished   Position Semi-Reed's   Treatment Tolerance Well       
   05/19/24 2347   NIV Type   NIV Started/Stopped On   Equipment Type V60   Mode CPAP   Mask Type Full face mask   Mask Size Large   Assessment   Pulse 85   Respirations 26   Comfort Level Fair   Using Accessory Muscles No   Mask Compliance Fair   Skin Assessment Clean, dry, & intact   Settings/Measurements   PIP Observed 9 cm H20   CPAP/EPAP 10 cmH2O   Vt (Measured) 607 mL   FiO2  40 %   Minute Volume (L/min) 13.2 Liters   Mask Leak (lpm) 35 lpm   Patient's Home Machine No   Alarm Settings   Alarms On Y   Low Pressure (cmH2O) 8 cmH2O   High Pressure (cmH2O) 40 cmH2O   Delay Alarm 20 sec(s)   RR Low (bpm) 8   RR High (bpm) 40 br/min       
 conducted a Follow up consultation and Spiritual Assessment for Julien Avila, who is a 78 y.o.,male.      The  provided the following Interventions:  Continued the relationship of care and support.   Listened empathically.  Offered prayer and assurance of continued prayer on patients behalf.   Chart reviewed.    The following outcomes were achieved:  Patient received communion and sacrament of the sick by our .    Assessment:  There are no further spiritual or Mu-ism issues which require Spiritual Care Services interventions at this time.     Plan:  Chaplains will continue to follow and will provide pastoral care on an as needed/requested basis.   recommends bedside caregivers page  on duty if patient shows signs of acute spiritual or emotional distress.       Chaplain Liana Sheffield  Spiritual Care   [phone]   
07:00 - report received and care assumed.  Patient up in chair.  Chest tube to water seal.  Male purewick in place with ernie urine.  10:00 - OT in to work with patient, assisted to commode with external catheter disconnected.  Patient voided in toilet with no BM.  11:00 - patient up to commode with nursing for large bowel movement.  12:00 - physical therapy present in room with patient.  Obtained clam shell brace from wife.  Instructed patient and staff on how to apply.  Patient should wear brace whenever out of bed, may sit on side of bed to apply. Patient does need assistance to don/doff brace.  13:00 - chest tubes removed.  14:00 - patient assisted back to bed for a rest.  16:30 - patient went into a fib.  Dr. Aguayo notified.  17:30 - patient assisted to don brace and up to chair.  Patient using flutter valve and IS.  19:00 - report given to Autumn OCHOA  
07:15  Received pt up in chair from Autumn Brar RN.  Pt A&O x 4.  Pt is moving cpap mask to side.  Reminded need for tight seal for cpap to function properly. Sp02 91%.  Denies pain.  Chest tube draining serosanguinous liquid to atrium on water seal.  VSS.   09:45  Atrium changed for new and attached to -20 cm constant wall suction.  Using IS to 750 ml.   11:20  Bedside and Verbal shift change report given to Consuelo Sawyer RN (oncoming nurse) by anthony Gross RN (offgoing nurse). Report included the following information Nurse Handoff Report, Adult Overview, Surgery Report, Intake/Output, MAR, Recent Results, and Cardiac Rhythm Sinus rhythm .      
1000-Patient into room 215. He is alert and oriented, he reports some back discomfort, vital signs stable at this time,  Telemetry monitor reading Sinus Rhythm, skin check done with 2nd nurse Maria Teresa Collins RN, patient sitting up in bed, eating a pint of ice cream and talking on his phone.  Received telephone report from CVT nurse Eleuterio RIVERA RN      
1128- SBAR given to Consuelo RN from GABRIELA Santiago. Opportunities for questions were given and answered. Patient requests for CPAP to put back on so he can \"just sleep\".  1155- Dr. Aguayo at bedside  1208- Notified respiratory therapist that patient complaint of cpap mask being uncomfortable, and alarms alarming. Respiratory therapist said this RN can \"put a bandaid around his nose if patient is uncomfortable\"  1211- respiratory therapist at bedside  1440- one unmeasurable occurrence of urine. Patient return to bed.  1914- CHIN Carson called to clarify CT order. Patient OK to be on water seal.  
1600: Pt arrived from IR with RN at bedsides. New chest tube noted to right side. Sanguinous drainage noted. Informed by IR RN that the tube was clamped per IR MD and to unclamp and clamp as the patient tolerates. IR RN unclamped chest tube at bedside and ~400mL of sanguinous drainage was removed. The patient did not tolerate this well, screaming in pain. CT surgery messaged for orders and bedside consult regarding transfer to CVT ICU for closer monitoring.   
1930-Patient alert and oriented, respiratory therapist at bedside giving patient a breathing treatment, patient with no complaints/concerns at this time. Report given to Opal MOSES RN. Skin check also complete at this time.  
1955: TRANSFER - IN REPORT:    Verbal report received from GABRIELA Manzo on Julien Avila being received from [facility] (ED) for routine progression of care.     Report consisted of patient’s Situation, Background, Assessment and Recommendations(SBAR).     Information from the following report(s) SBAR, Kardex, Intake/Output, MAR, Recent Results, and Cardiac Rhythm NSR  was reviewed. Opportunity for questions and clarification was provided.      Assessment completed upon patient’s arrival to unit and care assumed.     2205: Patient received to room 2305. Patient connected to monitor,V/S obtained and assessment completed. Pt A/Ox4. No c/o pain or SOB at this time. 95% SpO2 on 10L HFNC. Noted Dyspnea on exertion. Pt arrived with zosyn running. CHG bath done. Plan of care reviewed. Patient oriented to room and call light. Patient aware to use call light to communicate any pain or needs.     Admission skin assessment completed with second RN and reveals the following: Skin intact; redness noted on the sacral area.         
4 Eyes Skin Assessment     NAME:  Julien Avila  YOB: 1945  MEDICAL RECORD NUMBER:  [id number]    The patient is being assessed for  Shift Handoff    I agree that at least one RN has performed a thorough Head to Toe Skin Assessment on the patient. ALL assessment sites listed below have been assessed.      Areas assessed by both nurses:    Head, Face, Ears, Shoulders, Back, Chest, Arms, Elbows, Hands, Sacrum. Buttock, Coccyx, Ischium, and Legs. Feet and Heels        Does the Patient have a Wound? Yes wound(s) were present on assessment. LDA wound assessment was Initiated and completed by RN       Booker Prevention initiated by RN: Yes  Wound Care Orders initiated by RN: Yes    Pressure Injury (Stage 3,4, Unstageable, DTI, NWPT, and Complex wounds) if present, place Wound referral order by RN under : No    New Ostomies, if present place, Ostomy referral order under : No     Nurse 1 eSignature: Electronically signed by Mandy العلي RN on 5/19/24 at 7:37 AM EDT    **SHARE this note so that the co-signing nurse can place an eSignature**    Nurse 2 eSignature: Electronically signed by Brennan Bassett RN on 5/19/24 at 7:40 AM EDT   
4 Eyes Skin Assessment     NAME:  Julien Avila  YOB: 1945  MEDICAL RECORD NUMBER:  [id number]    The patient is being assessed for  Shift Handoff    I agree that at least one RN has performed a thorough Head to Toe Skin Assessment on the patient. ALL assessment sites listed below have been assessed.      Areas assessed by both nurses:    Head, Face, Ears, Shoulders, Back, Chest, Arms, Elbows, Hands, Sacrum. Buttock, Coccyx, Ischium, Legs. Feet and Heels, and Under Medical Devices         Does the Patient have a Wound? Yes wound(s) were present on assessment. LDA wound assessment was Initiated and completed by RN       Booker Prevention initiated by RN: Yes  Wound Care Orders initiated by RN: No    Pressure Injury (Stage 3,4, Unstageable, DTI, NWPT, and Complex wounds) if present, place Wound referral order by RN under : No    New Ostomies, if present place, Ostomy referral order under : No     Nurse 1 eSignature: Electronically signed by Brennan Bassett RN on 5/18/24 at 6:58 PM EDT    **SHARE this note so that the co-signing nurse can place an eSignature**    Nurse 2 eSignature: Electronically signed by Mandy العلي RN on 5/19/24 at 6:36 AM EDT      
4 Eyes Skin Assessment     NAME:  Julien Avila  YOB: 1945  MEDICAL RECORD NUMBER:  [id number]    The patient is being assessed for  Shift Handoff    I agree that at least one RN has performed a thorough Head to Toe Skin Assessment on the patient. ALL assessment sites listed below have been assessed.      Areas assessed by both nurses:    Head, Face, Ears, Shoulders, Back, Chest, Arms, Elbows, Hands, Sacrum. Buttock, Coccyx, Ischium, Legs. Feet and Heels, and Under Medical Devices         Does the Patient have a Wound? Yes wound(s) were present on assessment. LDA wound assessment was Initiated and completed by RN       Booker Prevention initiated by RN: Yes  Wound Care Orders initiated by RN: No    Pressure Injury (Stage 3,4, Unstageable, DTI, NWPT, and Complex wounds) if present, place Wound referral order by RN under : No    New Ostomies, if present place, Ostomy referral order under : No     Nurse 1 eSignature: Electronically signed by Brennan Bassett RN on 5/19/24 at 7:31 PM EDT    **SHARE this note so that the co-signing nurse can place an eSignature**    Nurse 2 eSignature: {Esignature:[id number]}    
4 Eyes Skin Assessment     NAME:  Julien Avila  YOB: 1945  MEDICAL RECORD NUMBER:  [id number]    The patient is being assessed for  Shift Handoff    I agree that at least one RN has performed a thorough Head to Toe Skin Assessment on the patient. ALL assessment sites listed below have been assessed.      Areas assessed by both nurses:    Head, Face, Ears, Shoulders, Back, Chest, Arms, Elbows, Hands, Sacrum. Buttock, Coccyx, Ischium, Legs. Feet and Heels, and Under Medical Devices         Does the Patient have a Wound? No noted wound(s)       Booker Prevention initiated by RN: Yes  Wound Care Orders initiated by RN: No    Pressure Injury (Stage 3,4, Unstageable, DTI, NWPT, and Complex wounds) if present, place Wound referral order by RN under : No    New Ostomies, if present place, Ostomy referral order under : No     Nurse 1 eSignature: Electronically signed by Sayra Khan RN on 5/20/24 at 6:33 AM EDT    **SHARE this note so that the co-signing nurse can place an eSignature**    Nurse 2 eSignature: {Esignature:[id number]}  
ARU/IPR REFERRAL CONTACT NOTE  [facility] for [facility]      Thank you for the opportunity to review this patient's case for admission to [facility].    Referral received: 5/17/2024 time:829am    Based on our pre-admission screening:                          [ x] Our Team/Medical Director is following this case.   Comments: Referral received and will review with team.     Addendum 1154am:  Pt and significant other are open to ARU. Will initiate authorization with [employer].     Again, Thank you for this referral. Should you have any questions please do not hesitate to call.     Sincerely,  Rukhsana Dejesus    Clinical Liaison  [facility]  [phone]         
ARU/IPR REFERRAL CONTACT NOTE  [facility]      Thank you for the opportunity to review this patient's case for admission to [facility].    Based on our pre-admission screening:                          [x ] This patient meets criteria for admission to [facility].    Plan to admit patient to ARU room 189@2pm  Please call report to [phone] prior to transfer  Appreciate discharge summary in chart prior to discharge.     Again, Thank you for this referral. Should you have any questions please do not hesitate to call.     Sincerely,  Rukhsana Dejesus    Clinical Liaison  [facility]  [phone]          
Advance Care Planning   Healthcare Decision Maker:    Primary Decision Maker: Garrick Avila - Child - [phone]    Secondary Decision Maker: Shae Shook - Domestic Partner - [phone]    Today we documented Decision Maker(s) consistent with ACP documents on file.        conducted an initial consultation and Spiritual Assessment for Julien Avila, who is a 78 y.o.,male. Patient's Primary Language is: English.   According to the patient's EMR Christian Affiliation is: Uatsdin.     The reason the Patient came to the hospital is:   Patient Active Problem List    Diagnosis Date Noted    Acute respiratory failure with hypoxia (HCC) 05/13/2024    Pleural effusion 05/13/2024    Hemothorax on right 05/13/2024    Obstructive sleep apnea 05/13/2024    Closed fracture of multiple ribs of left side 05/13/2024    Closed fracture of thoracic vertebra (HCC) 05/13/2024    Normocytic anemia 05/13/2024    Anxiety and depression 05/13/2024    Osteoarthritis of right hip 02/15/2021    Diverticulosis of colon 08/19/2015    Near syncope 03/30/2014    Hypopotassemia 06/06/2013    Bipolar disorder (HCC) 05/26/2013    HTN (hypertension) 05/26/2013    Cerebrovascular disease 05/26/2013    Long term (current) use of anticoagulants     LVH (left ventricular hypertrophy)     SOB (shortness of breath) 02/10/2011    Chest pain 02/09/2011    Atrial fibrillation (HCC) 02/09/2011        The  provided the following Interventions:  Initiated a relationship of care and support.   Offered prayer and assurance of continued prayers on patient's behalf.   Chart reviewed.    The following outcomes where achieved:  Patient already has a scanned Advance Medical Directive in the chart. See ADV Care Plan. No changes need to be made.  Patient received communion from [employer].    Assessment:  Patient does not have any Taoism/cultural needs that will affect patient's preferences in health care.  There are no spiritual or 
Aleksey Pate [facility] Hospitalist Group  Progress Note    Patient: Julien Avila Age: 78 y.o. : 1945 MR#: [id number] SSN: [id number]  Date/Time: 5/15/2024     Subjective: Patient lying in the bed, feels much better today.  Pain well-controlled.  Currently on CPAP but was off CPAP earlier today.  At his breakfast.  Complains of constipation, requesting for laxatives.     Assessment/Plan:   Right hemothorax post chest tube placement by IR 2024  2.  Acute respiratory failure with hypoxia  3.  Leukocytosis, trending down  4.  Fractures of left ribs Secondary to trauma  5.  Thoracic vertebral fractures  6.  Hypertension  7.  Atrial fibrillation   8.  Hyperlipidemia  9.  Anxiety and depression  10.  Obstructive sleep apnea  11. Hyponatremia, improving    Plan  Continue chest tube management per CT surgery and IR  CT surgery planning for VATS based on chest tube output  Will continue oxygen supplementation, CPAP nightly and as needed  Judicious use of pain medications  Will monitor hemoglobin closely, currently no signs of bleeding  Pulmonary input noted, recommend no intervention and signed off  Continue bronchial hygiene protocol, incentive spirometry  Xarelto on hold due to bleeding risk, will resume when okay with CT surgery  Continue amiodarone, statin  Continue holding HCTZ and monitor sodium levels  PT/OT eval and treatment  Further plan based on hospital course      Dispo plan: Home with home health care once medically stable, anticipated discharge date 2024    Discussed with patient at bedside and explained about my above plan care.  Patient understood and agreed with the plan.  Answered all his questions and concerns appropriate.        Case discussed with:  [x]Patient  []Family  [x]Nursing  []Case Management  DVT Prophylaxis:  []Lovenox  []Hep SQ  [x]SCDs  []Coumadin   []Eliquis/Xarelto     Objective:   VS: /63   Pulse 77   Temp 98.2 °F (36.8 °C) (Oral)   Resp 20   Ht 
Aleksey Pate [facility] Hospitalist Group  Progress Note    Patient: Julien Avila Age: 78 y.o. : 1945 MR#: [id number] SSN: [id number]  Date/Time: 2024     Subjective: Patient seen evaluated, lying in bed, no acute distress.  Continue current treatment plan, patient is awaiting discharge to acute rehab unit.     Assessment/Plan:   Right hemothorax post chest tube placement by IR 2024 and chest tube removal 2024 by CT surgery  2.  Acute respiratory failure with hypoxia  3.  Leukocytosis, better  4.  Fractures of left ribs Secondary to trauma  5.  Thoracic vertebral fractures  6.  Hypertension  7.  Atrial fibrillation   8.  Hyperlipidemia  9.  Anxiety and depression  10.  Obstructive sleep apnea  11. Hyponatremia due to HCTZ, improving    Plan  Chest tube removed by CT surgery yesterday  Patient still requiring 5 L oxygen supplementation  Weaned to 4 L today, will continue to wean as tolerated  CT surgery and pulmonary signed off   Discussed with CT surgery, recommend to resume Xarelto next week  Will continue oxygen supplementation, CPAP nightly and as needed  Judicious use of pain medications  Will monitor hemoglobin closely, currently no signs of bleeding  All cultures negative, leukocytosis better  Continue bronchial hygiene protocol, incentive spirometry  Continue amiodarone, statin  Continue holding HCTZ and monitor sodium levels  PT/OT eval and treatment  Further plan based on hospital course    Continue current treatment plan.  Dispo plan: ARU once insurance authorizes and medically stable, anticipate discharge 2024    Discussed with patient and explained about my above plan care.  Discussed about discharge planning process, patient prefer going to the ARU if accepted.        Case discussed with:  [x]Patient  []Family  [x]Nursing  []Case Management  DVT Prophylaxis:  []Lovenox  []Hep SQ  [x]SCDs  []Coumadin   []Eliquis/Xarelto     Objective:   VS: BP (!) 166/79   Pulse 
Aleksey Pate [facility] Hospitalist Group  Progress Note    Patient: Julien Avila Age: 78 y.o. : 1945 MR#: [id number] SSN: [id number]  Date/Time: 2024     Subjective: Patient lying in the bed, feels okay.  Pain controlled.  Denies any shortness of breath or chest pain.  He is currently on CPAP.  Chart reviewed, events noted.     Assessment/Plan:   Right hemothorax   2.  Acute respiratory failure with hypoxia  3.  Leukocytosis, no signs of infection  4.  Fractures of left ribs Secondary to trauma  5.  Thoracic vertebral fractures  6.  Hypertension  7.  Atrial fibrillation   8.  Hyperlipidemia  9.  Anxiety and depression  10.  Obstructive sleep apnea  11. Hyponatremia    Plan  CT surgery consulted, discussed with surgical team.  Plan for IR to put chest tube today.  Will continue oxygen supplementation, CPAP nightly and as needed  Judicious use of pain medications  Will monitor hemoglobin closely, currently no signs of bleeding  Pulmonary will be consulted, discussed with Dr. Gonsales  Continue bronchial hygiene protocol, incentive spirometry  Xarelto on hold due to bleeding risk  Continue amiodarone, statin  Agree with holding HCTZ and monitor sodium levels  PT/OT eval and treatment  Further plan based on hospital course      Dispo plan: Home with home health care once medically stable, anticipated discharge date 2024    Discussed with patient and also with significant other Ms. Shook over the phone and explained in detail about my above plan care.  Both of them understood and agreed with my plan.  I had obtained permission from the patient before calling the significant other.    I spent 50 minutes with the patient in face-to-face consultation, of which greater than 50% was spent in counseling and coordination of care as described above.    Case discussed with:  [x]Patient  [x]Family  [x]Nursing  []Case Management  DVT Prophylaxis:  []Lovenox  []Hep SQ  []SCDs  []Coumadin   
Aleksey Pate [facility] Hospitalist Group  Progress Note    Patient: Julien Avila Age: 78 y.o. : 1945 MR#: [id number] SSN: [id number]  Date/Time: 2024     Subjective: Patient seen evaluated, lying in bed, no acute distress.  Continue current treatment plan, patient is awaiting discharge to acute rehab unit.     Assessment/Plan:   Right hemothorax post chest tube placement by IR 2024 and chest tube removal 2024 by CT surgery  2.  Acute respiratory failure with hypoxia  3.  Leukocytosis, better  4.  Fractures of left ribs Secondary to trauma  5.  Thoracic vertebral fractures  6.  Hypertension  7.  Atrial fibrillation   8.  Hyperlipidemia  9.  Anxiety and depression  10.  Obstructive sleep apnea  11. Hyponatremia due to HCTZ, improving    Plan  Chest tube removed by CT surgery yesterday  Patient still requiring 5 L oxygen supplementation  Weaned to 4 L today, will continue to wean as tolerated  CT surgery and pulmonary signed off   Discussed with CT surgery, recommend to resume Xarelto next week  Will continue oxygen supplementation, CPAP nightly and as needed  Judicious use of pain medications  Will monitor hemoglobin closely, currently no signs of bleeding  All cultures negative, leukocytosis better  Continue bronchial hygiene protocol, incentive spirometry  Continue amiodarone, statin  Continue holding HCTZ and monitor sodium levels  PT/OT eval and treatment  Further plan based on hospital course    Continue current treatment plan.  Dispo plan: ARU once insurance authorizes and medically stable, anticipate discharge 2024    Discussed with patient and explained about my above plan care.  Discussed about discharge planning process, patient prefer going to the ARU if accepted.        Case discussed with:  [x]Patient  []Family  [x]Nursing  []Case Management  DVT Prophylaxis:  []Lovenox  []Hep SQ  [x]SCDs  []Coumadin   []Eliquis/Xarelto     Objective:   VS: BP (!) 141/81   Pulse 
[facility]      Follow up for hemothorax s/p trauma     Chief Complaint:  Pain    Interval History:  CT DC, CXR w/o PTX, off O2, ARU referral.        Exam:   BP (!) 159/91   Pulse 77   Temp 98.5 °F (36.9 °C) (Axillary)   Resp 19   Ht 1.753 m (5' 9\")   Wt 116 kg (255 lb 12.8 oz)   SpO2 97%   BMI 37.78 kg/m²      Const: alert and oriented.  NAD   CV:  RRR without murmur or rub.  PMI not displaced.  Noperipheral edema   Respiratory: Cracles without wheezes, rales or rhonchi.  No accessory muscle use.     Assessment:  Doing well after CT DC. Still needs pulm and physical rehab    PLAN:    Cont RT  Cont intermittent CXR  Awaiting ARU acceptance  CTS signing off, please contact PRN  
[facility]      Follow up for hemothorax s/p trauma     Chief Complaint:  Pain    Interval History:  [place] , off O2, OOB, ambulating but with some difficulty. Using IS        Exam:   /67   Pulse 77   Temp 98.1 °F (36.7 °C) (Oral)   Resp 23   Ht 1.753 m (5' 9\")   Wt 115.7 kg (255 lb)   SpO2 90%   BMI 37.66 kg/m²      Const: alert and oriented.  NAD   CV:  RRR without murmur or rub.  PMI not displaced.  Noperipheral edema   Respiratory: Fine crackles @ base without wheezes, rales or rhonchi.  No accessory muscle use.     Assessment: Hemothorax drained, lungs hypoinflated. Will benefit from rehab    PLAN:  DC CT, Increase RT, add PT/OT. Eval for ARU. OK to transfer to floor.   
[facility]      Follow up for hemothorax s/p trauma    Chief Complaint:  pain    Interval History:  IR placed CT with resultant 1800cc of hemothorax drained.  He was transferred to ICU d/t pain from drainage and lung re-expansion. Improved breathing, off BiPap.      Exam:   /66   Pulse 81   Temp 99.1 °F (37.3 °C) (Axillary)   Resp 30   Ht 1.753 m (5' 9\")   Wt 117.8 kg (259 lb 12.8 oz)   SpO2 90%   BMI 38.37 kg/m²      Const: alert and oriented.  NAD   CV:  RRR without murmur or rub.  PMI not displaced.  Noperipheral edema   Respiratory: Clear to ascultation without wheezes, rales or rhonchi.  No accessory muscle use.     CXR:   IMPRESSION:     1. Minimally improved aeration of bilateral hemithoraces without pneumothorax..  Tubes and catheters as above. Follow-up with plain imaging of the chest.    Assessment:  S/p drainage of R hemothorax    PLAN:  Cont CT for today on water seal, OK to transfer to floor. Cont RT interventions       
Comprehensive Nutrition Assessment    Type and Reason for Visit:  Initial, Wound, RD Nutrition Re-Screen/LOS    Nutrition Recommendations/Plan:   Continue current diet, encourage PO intake.   Plan to add oral nutrition supplement to optimize nutrition intake opportunity: Ensure Plus High Protein (each provides 350 kcal, 20g protein) BID  Continue MVI r/t increased nutrient needs and variable meal intake during admission.      Malnutrition Assessment:  Malnutrition Status:  Insufficient data (at risk for malnutrition r/t variable meal intake during admission.) (05/20/24 1623)    Context:  Acute Illness       Nutrition History and Allergies:   PMHx: A fib, h/o skin cancer, HTN, HLD. Sleep apnea. IBS. Wt hx: c wt- 255.8 lb (bed scale), 251 lb (2/16/24, +1.9%), 239 lb (12/4/23, +7%), 258 lb (5/19/23), no significant wt change documented x 1 year PTA. NKFA.      Nutrition Assessment:    Pt admitted for management of hemothorax on right, closed fracture of multiple ribs of left side/thoracic vertebra. Received referral r/t pt with stage 2 PI. Per flow sheets, meal intake 0-100%, with 4/8 entries >50% when pt with diet ordered. Pt unavailable to speak to at rime of visit x2, therapy/RN in room. Plan to order oral nutrition supplements r/t variable intake during admission and increased nutrient needs 2/2 wounds. Note possible d/c to ARU today, will continue to monitor pt during admission. Re-attempt to discuss nutrition/wt hx at follow-up as able.     Nutrition Related Findings:    Last BM: 5/16. Edema: none. Labs: Na 132 L. Cl 94 L. Pertinent meds: MVI, calcium with vit D, senokot, thiamine. Wound Type: Pressure Injury, Stage II       Current Nutrition Intake & Therapies:    Average Meal Intake: 0%, %  Average Supplements Intake: None Ordered  ADULT DIET; Regular    Anthropometric Measures:  Height: 175.3 cm (5' 9\")  Ideal Body Weight (IBW): 160 lbs (73 kg)       Current Body Weight: 116 kg (255 lb 12.8 oz), 159.9 % 
During routine checks noticed patients Spo2 was at 76 despite using his home CPAP. Ble din with 5-6 lpm and saturations did not approve. Pt had to be switched to hospital grade cpap in order for sats to increase. Spoke with both patient and spouse and suggested when discharged, pt may need to go get his pressures checked out as 8cmh20 may not be suffice. Will continue to monitor and assess respiratory vitals. No signs of distress noted.  
Patient received communion from [employer] and received Sacrament of the Sick from the  for Julien Avila, who is a 78 y.o.,male.      The  provided the following Interventions:  Continued the relationship of care and support.   Offered prayer and assurance of continued prayer on patients behalf.   Chart reviewed.    The following outcomes were achieved:  Patient received communion from [employer]. Patient received anointing from the .    Assessment:  There are no further spiritual or Sabianist issues which require Spiritual Care Services interventions at this time.     Plan:  Chaplains will continue to follow and will provide pastoral care on an as needed/requested basis.   recommends bedside caregivers page  on duty if patient shows signs of acute spiritual or emotional distress.     Mara Hernandez, [facility]     Spiritual Care   [phone]   
Patient states that he does not want to be given Morphine for pain. States that it makes his mind \"cloudy\" and cannot think straight. Explained to the patient that he does have another as needed pain medication which he prefers to get instead of the Morphine. Patient is agreeable and understands. Will pass on information to the oncoming RN. Care ongoing.   
Patient tolerating present cpap machine.  No distress noted.  Continue to monitor.    0730  4 Eyes Skin Assessment     NAME:  Jluien Avila  YOB: 1945  MEDICAL RECORD NUMBER:  [id number]    The patient is being assessed for  Shift Handoff    I agree that at least one RN has performed a thorough Head to Toe Skin Assessment on the patient. ALL assessment sites listed below have been assessed.      Areas assessed by both nurses:    Head, Face, Ears, Shoulders, Back, Chest, Arms, Elbows, Hands, Sacrum. Buttock, Coccyx, Ischium, Legs. Feet and Heels, and Under Medical Devices         Does the Patient have a Wound? Yes wound(s) were present on assessment. LDA wound assessment was Initiated and completed by RN       Booker Prevention initiated by RN: No  Wound Care Orders initiated by RN: No    Pressure Injury (Stage 3,4, Unstageable, DTI, NWPT, and Complex wounds) if present, place Wound referral order by RN under : No    New Ostomies, if present place, Ostomy referral order under : No     Nurse 1 eSignature: Electronically signed by tierra altman RN on 5/18/24 at 9:13 AM EDT    **SHARE this note so that the co-signing nurse can place an eSignature**    Nurse 2 eSignature: Electronically signed by Brennan Bassett RN on 5/18/24 at 9:14 AM EDT   
Patient was transferred from CVT step down to CT for procedure. Patient requested to be on CPAP. In CT placed on CPAP of 8. Patient tolerated procedure well on CPAP due to can not be flat. RT at bedside for over 45 min until patient was transferred back to his room.   
Pt has jacob BS. Currently eating breakfast. Will instruct on how to us elater. Patients aske dto wait after his nap. No signs of distress noted. Will continue to monitor and assess respiratory vials.  
RT called up to give breathing tx by nursing as patient requesting. Upon arrival patient resting comfortably with no signs of distress. However, patient and wife concerned because his mask to his home cpap was lost while he has been in hospital and they are worried about how they are going to get another one before DC. They were asking to use our mask that goes with the v-60 and stated another RT said they could. I told them that just because it may fit does not mean it is safe to use with their machine. Encouraged family to call their DME company that provided current cpap to see if another mask could be obtained . RT notified nursing and was informed she would attempt to reach out to case management to see if they could assist with getting a new mask       05/19/24 6378   Treatment   Treatment Type HHN   $Treatment Type $Inhaled Therapy/Meds   Medications Albuterol/Ipratropium   Pre-Tx Pulse 75   Pre-Tx Resps 18   Breath Sounds Pre-Tx ELICIA Diminished   Breath Sounds Pre-Tx LLL Diminished   Breath Sounds Pre-Tx RUL Diminished   Breath Sounds Pre-Tx RML Diminished   Breath Sounds Pre-Tx RLL Diminished   Breath Sounds Post-Tx ELICIA Diminished   Breath Sounds Post-Tx LLL Diminished   Breath Sounds Post-Tx RUL Diminished   Breath Sounds Post-Tx RML Diminished   Breath Sounds Post-Tx RLL Diminished   Position Semi-Reed's   Treatment Tolerance Well   Oxygen Therapy/Pulse Ox   O2 Therapy Oxygen humidified   O2 Device Nasal cannula   O2 Flow Rate (L/min) 5 L/min   FiO2  40 %   Pulse 71   Respirations 20   SpO2 91 %       
Rt at bedside  for assessment patient's machine how now been setup for use. Fio2 decreased to 4lpm.    05/16/24 1154   NIV Type   NIV Started/Stopped Off   Mask Type Nasal pillows   Mask Size Large   Assessment   Pulse 71   Heart Rate Source Monitor   SpO2 96 %   Comfort Level Good   Using Accessory Muscles No   Mask Compliance Good   Skin Assessment Clean, dry, & intact   Skin Protection for O2 Device Yes   Breath Sounds   Breath Sounds Bilateral Expiratory wheezing   Settings/Measurements   CPAP/EPAP 8 cmH2O   Patient's Home Machine Yes (type/vendor)  (Respironics)       
TRANSFER - OUT REPORT:    Verbal report given to Magda OCHOA on Julien Avila  being transferred to acute rehab room 189 for routine progression of patient care       Report consisted of patient's Situation, Background, Assessment and   Recommendations(SBAR).     Information from the following report(s) Nurse Handoff Report, Intake/Output, MAR, Cardiac Rhythm Afib, and Neuro Assessment was reviewed with the receiving nurse.           Lines:   Peripheral IV 05/12/24 Right Antecubital (Active)   Site Assessment Clean, dry & intact 05/19/24 2138   Line Status Capped;Flushed 05/19/24 2138   Line Care Cap changed 05/19/24 2138   Phlebitis Assessment No symptoms 05/19/24 2138   Infiltration Assessment 0 05/19/24 2138   Alcohol Cap Used Yes 05/19/24 2138   Dressing Status Clean, dry & intact 05/19/24 2138   Dressing Type Transparent 05/19/24 2138   Dressing Intervention New 05/15/24 2000       Peripheral IV 05/13/24 Posterior;Right Hand (Active)   Site Assessment Clean, dry & intact 05/17/24 1100   Line Status Flushed;Capped 05/17/24 1100   Line Care Connections checked and tightened 05/17/24 1100   Phlebitis Assessment No symptoms 05/17/24 1100   Infiltration Assessment 0 05/17/24 1100   Alcohol Cap Used Yes 05/17/24 1100   Dressing Status Clean, dry & intact 05/17/24 1100   Dressing Type Transparent 05/17/24 1100   Dressing Intervention New 05/15/24 2000       Extended Dwell Peripherial IV 05/13/24 Right Cephalic (Active)   Criteria for Appropriate Use Other (comment) 05/19/24 1600   Site Assessment Clean, dry & intact 05/19/24 2138   Phlebitis Assessment No symptoms 05/19/24 2138   Infiltration Assessment 0 05/19/24 2138   Line Status Capped;Flushed 05/19/24 2138   Line Care Cap changed;Connections checked and tightened 05/19/24 2138   Alcohol Cap Used Yes 05/19/24 2138   Date of Last Dressing Change 05/15/24 05/19/24 2138   Dressing Type Transparent 05/19/24 2138   Dressing Status Clean, dry & intact 05/19/24 1600 
Wife brought in patient's home Cpap and Nasal pillows. Pt states he will wear whenever he naps. Pt pressure is 8cmh20 with use of nasal  pillows. No signs of distress noted. Will continue to monitor and assess respiratory vitals.   05/16/24 1154   NIV Type   NIV Started/Stopped Off   Mask Type Nasal pillows   Mask Size Large   Assessment   Pulse 71   Heart Rate Source Monitor   SpO2 96 %   Comfort Level Good   Using Accessory Muscles No   Mask Compliance Good   Skin Assessment Clean, dry, & intact   Skin Protection for O2 Device Yes   Breath Sounds   Breath Sounds Bilateral Expiratory wheezing   Settings/Measurements   CPAP/EPAP 8 cmH2O   Patient's Home Machine Yes (type/vendor)  (Respironics)       
   Or    potassium bicarb-citric acid (EFFER-K) effervescent tablet 40 mEq  40 mEq Oral PRN    Or    potassium chloride 10 mEq/100 mL IVPB (Peripheral Line)  10 mEq IntraVENous PRN    magnesium sulfate 2000 mg in 50 mL IVPB premix  2,000 mg IntraVENous PRN    ondansetron (ZOFRAN-ODT) disintegrating tablet 4 mg  4 mg Oral Q8H PRN    Or    ondansetron (ZOFRAN) injection 4 mg  4 mg IntraVENous Q6H PRN    polyethylene glycol (GLYCOLAX) packet 17 g  17 g Oral Daily PRN    acetaminophen (TYLENOL) tablet 650 mg  650 mg Oral Q6H PRN    Or    acetaminophen (TYLENOL) suppository 650 mg  650 mg Rectal Q6H PRN    amiodarone (CORDARONE) tablet 200 mg  200 mg Oral Daily    DULoxetine (CYMBALTA) extended release capsule 60 mg  60 mg Oral Daily    pravastatin (PRAVACHOL) tablet 40 mg  40 mg Oral Daily    thiamine mononitrate tablet 100 mg  100 mg Oral Daily    morphine (PF) injection 4 mg  4 mg IntraVENous Q4H PRN    oyster shell calcium w/D 500-5 MG-MCG tablet 1 tablet  1 tablet Oral BID       Labs:    No results found for this or any previous visit (from the past 24 hour(s)).      Signed By: Sepideh Aguilar DO     May 20, 2024      Disclaimer: Sections of this note are dictated using utilizing voice recognition software.  Minor typographical errors may be present. If questions arise, please do not hesitate to contact me or call our department.     
in 50 mL IVPB premix  2,000 mg IntraVENous PRN    ondansetron (ZOFRAN-ODT) disintegrating tablet 4 mg  4 mg Oral Q8H PRN    Or    ondansetron (ZOFRAN) injection 4 mg  4 mg IntraVENous Q6H PRN    polyethylene glycol (GLYCOLAX) packet 17 g  17 g Oral Daily PRN    acetaminophen (TYLENOL) tablet 650 mg  650 mg Oral Q6H PRN    Or    acetaminophen (TYLENOL) suppository 650 mg  650 mg Rectal Q6H PRN    amiodarone (CORDARONE) tablet 200 mg  200 mg Oral Daily    DULoxetine (CYMBALTA) extended release capsule 60 mg  60 mg Oral Daily    pravastatin (PRAVACHOL) tablet 40 mg  40 mg Oral Daily    therapeutic multivitamin-minerals 1 tablet  1 tablet Oral Daily    thiamine mononitrate tablet 100 mg  100 mg Oral Daily    morphine (PF) injection 4 mg  4 mg IntraVENous Q4H PRN    oyster shell calcium w/D 500-5 MG-MCG tablet 1 tablet  1 tablet Oral BID       Labs:    Recent Results (from the past 24 hour(s))   Basic Metabolic Panel    Collection Time: 05/16/24  5:06 AM   Result Value Ref Range    Sodium 129 (L) 136 - 145 mmol/L    Potassium 3.6 3.5 - 5.5 mmol/L    Chloride 93 (L) 100 - 111 mmol/L    CO2 31 21 - 32 mmol/L    Anion Gap 5 3.0 - 18 mmol/L    Glucose 115 (H) 74 - 99 mg/dL    BUN 21 (H) 7.0 - 18 MG/DL    Creatinine 0.89 0.6 - 1.3 MG/DL    BUN/Creatinine Ratio 24 (H) 12 - 20      Est, Glom Filt Rate 89 >60 ml/min/1.73m2    Calcium 9.5 8.5 - 10.1 MG/DL   CBC with Auto Differential    Collection Time: 05/16/24  5:06 AM   Result Value Ref Range    WBC 12.7 4.6 - 13.2 K/uL    RBC 4.27 (L) 4.35 - 5.65 M/uL    Hemoglobin 13.5 13.0 - 16.0 g/dL    Hematocrit 40.7 36.0 - 48.0 %    MCV 95.3 78.0 - 100.0 FL    MCH 31.6 24.0 - 34.0 PG    MCHC 33.2 31.0 - 37.0 g/dL    RDW 12.2 11.6 - 14.5 %    Platelets 366 135 - 420 K/uL    MPV 9.2 9.2 - 11.8 FL    Nucleated RBCs 0.0 0  WBC    nRBC 0.00 0.00 - 0.01 K/uL    Neutrophils % 77 (H) 40 - 73 %    Lymphocytes % 10 (L) 21 - 52 %    Monocytes % 9 3 - 10 %    Eosinophils % 2 0 - 5 %    
% 10 (L) 21 - 52 %    Monocytes % 9 3 - 10 %    Eosinophils % 2 0 - 5 %    Basophils % 1 0 - 2 %    Immature Granulocytes % 1 (H) 0.0 - 0.5 %    Neutrophils Absolute 8.2 (H) 1.8 - 8.0 K/UL    Lymphocytes Absolute 1.1 0.9 - 3.6 K/UL    Monocytes Absolute 0.9 0.05 - 1.2 K/UL    Eosinophils Absolute 0.2 0.0 - 0.4 K/UL    Basophils Absolute 0.1 0.0 - 0.1 K/UL    Immature Granulocytes Absolute 0.1 (H) 0.00 - 0.04 K/UL    Differential Type AUTOMATED     Magnesium    Collection Time: 05/17/24  5:53 AM   Result Value Ref Range    Magnesium 2.6 1.6 - 2.6 mg/dL       Signed By: Lizzy Anguiano MD     May 17, 2024      Disclaimer: Sections of this note are dictated using utilizing voice recognition software.  Minor typographical errors may be present. If questions arise, please do not hesitate to contact me or call our department.

## 2024-05-20 NOTE — CARE COORDINATION
05/15/24 0947   IMM Letter   IMM Letter given to Patient/Family/Significant other/Guardian/POA/by: Khushi Walker   IMM Letter date given: 05/15/24   IMM Letter time given: 0947         Medicare pt has received, reviewed, and signed 2nd IM letter informing them of their right to appeal the discharge.  Signed copy has been placed on pt bedside chart.        SANDRA Santana RN  Care Management    
   05/15/24 1054   Readmission Assessment   Number of Days since last admission? 1-7 days   Previous Disposition Other (comment)  (Transfer from [facility])   Who is being Interviewed Patient   What was the patient's/caregiver's perception as to why they think they needed to return back to the hospital? Other (Comment)  (Transfer from [facility])   Did you visit your Primary Care Physician after you left the hospital, before you returned this time? No   Why weren't you able to visit your PCP? Other (Comment)   Did you see a specialist, such as Cardiac, Pulmonary, Orthopedic Physician, etc. after you left the hospital? No   Who advised the patient to return to the hospital? Physician   Does the patient report anything that got in the way of taking their medications? No   In our efforts to provide the best possible care to you and others like you, can you think of anything that we could have done to help you after you left the hospital the first time, so that you might not have needed to return so soon? Additional Community resources available for illness support           Khushi Walker, ELIEN RN  Care Management    
   05/20/24 1813   /Social Work Whiteboard Notes   /Social Work Whiteboard YELLOW  02/20/2024 Patient dispo ARU room 189 when medically ready. KAYLEE Winters BSW   Case Management      
Attempted CM initial assessment but pt not medically stable at this time.  Dr. Hidalgo and Luna NEELY at bedside.            Khushi Walker, ELIEN RN  Care Management    
Chart reviewed.  ANTHONY initiating auth  CM will continue to follow along.          Khushi Walker, ELIEN RN  Care Management    
Resource Information Provided? No   Mode of Transport at Discharge Other (see comment)  (family)   Confirm Follow Up Transport Family   Condition of Participation: Discharge Planning   The Plan for Transition of Care is related to the following treatment goals: Transitional care plan is home with family vs home health   The Patient and/or Patient Representative was provided with a Choice of Provider? Patient   The Patient and/Or Patient Representative agree with the Discharge Plan? Yes   Freedom of Choice list was provided with basic dialogue that supports the patient's individualized plan of care/goals, treatment preferences, and shares the quality data associated with the providers?  Yes  ([facility] for [facility] if needed)           ELIE SantanaN RN  Care Management

## 2024-05-20 NOTE — PLAN OF CARE
Problem: Discharge Planning  Goal: Discharge to home or other facility with appropriate resources  5/19/2024 0014 by Mandy العلي RN  Outcome: Progressing  5/18/2024 1732 by Brennan Bassett RN  Outcome: Progressing     Problem: Skin/Tissue Integrity  Goal: Absence of new skin breakdown  Description: 1.  Monitor for areas of redness and/or skin breakdown  2.  Assess vascular access sites hourly  3.  Every 4-6 hours minimum:  Change oxygen saturation probe site  4.  Every 4-6 hours:  If on nasal continuous positive airway pressure, respiratory therapy assess nares and determine need for appliance change or resting period.  5/19/2024 0014 by Mandy اعللي, RN  Outcome: Progressing  5/18/2024 1732 by Brennan Bassett RN  Outcome: Progressing     Problem: ABCDS Injury Assessment  Goal: Absence of physical injury  5/19/2024 0014 by Mandy العلي RN  Outcome: Progressing  5/18/2024 1732 by Brennan Bassett RN  Outcome: Progressing     Problem: Pain  Goal: Verbalizes/displays adequate comfort level or baseline comfort level  5/19/2024 0014 by Mandy العلي RN  Outcome: Progressing  5/18/2024 1732 by Brennan Bassett RN  Outcome: Progressing     
  Problem: Occupational Therapy - Adult  Goal: By Discharge: Performs self-care activities at highest level of function for planned discharge setting.  See evaluation for individualized goals.  Description: Occupational Therapy Goals:  Initiated 5/16/2024 to be met within 7-10 days.    1.  Patient will perform grooming with modified independence.   2.  Patient will perform bathing with supervision/set-up utilizing adaptive equipment.  3.  Patient will perform upper body dressing and lower body dressing  with supervision/set-up utilizing adaptive equipment..  4.  Patient will perform toilet transfers with supervision/set-up using RW with good balance.  5.  Patient will perform all aspects of toileting with supervision/set-up.  6.  Patient will participate in upper extremity therapeutic exercise/activities with supervision/set-up for 8-10 minutes to increase strength/endurance for ADLs.    7.  Patient will utilize energy conservation techniques during functional activities with min verbal cues.    PLOF: independent with ADLs and functional mobility, drives      Outcome: Progressing   OCCUPATIONAL THERAPY EVALUATION    Patient: Julien Avila (78 y.o. male)  Date: 5/16/2024  Primary Diagnosis: Hemothorax on right [J94.2]       Precautions: General Precautions, Fall Risk,  ,  ,  ,  , Spinal Precautions: No Bending, No Lifting, No Twisting,  ,      ASSESSMENT :  Bed mobility: cga supine -> sit edge of bed using log roll technique. STS with CGA, toilet transfer using RW w/ CGA. Pt reports that he has lost perception of time and feels very concerned about his memory/mind, states he feels the same was when he had a concussion from the first fall off of a horse. Psitting up in recliner at end of tx session, call bell within reach & pt verbalized understanding to utilize for assist e.g. functional transfers in order to prevent falls.     DEFICITS/IMPAIRMENTS:  Performance deficits / Impairments: Decreased functional mobility 
  Problem: Occupational Therapy - Adult  Goal: By Discharge: Performs self-care activities at highest level of function for planned discharge setting.  See evaluation for individualized goals.  Description: Occupational Therapy Goals:  Initiated 5/16/2024 to be met within 7-10 days.    1.  Patient will perform grooming with modified independence.   2.  Patient will perform bathing with supervision/set-up utilizing adaptive equipment.  3.  Patient will perform upper body dressing and lower body dressing  with supervision/set-up utilizing adaptive equipment..  4.  Patient will perform toilet transfers with supervision/set-up using RW with good balance.  5.  Patient will perform all aspects of toileting with supervision/set-up.  6.  Patient will participate in upper extremity therapeutic exercise/activities with supervision/set-up for 8-10 minutes to increase strength/endurance for ADLs.    7.  Patient will utilize energy conservation techniques during functional activities with min verbal cues.    PLOF: independent with ADLs and functional mobility, drives      Outcome: Progressing   OCCUPATIONAL THERAPY TREATMENT    Patient: Julien Avila (78 y.o. male)  Date: 5/20/2024  Diagnosis: Hemothorax on right [J94.2] Hemothorax on right      Precautions: General Precautions, Fall Risk,  ,  ,  ,  , Spinal Precautions: No Bending, No Lifting, No Twisting,  ,      Chart, occupational therapy assessment, plan of care, and goals were reviewed.  ASSESSMENT:  Pt presented supine in bed upon entry, on 3L O2NC, and agreeable to work with OT. He transitioned to EOB CGA w/ log roll technique in prep for functional tasks. Once sitting, pt demo G balance and reviewed spinal precautions recalling 3/3. He required MIN A donning his clam shell brace requiring increased time. STS transfer CGA with RW. He maneuvered to reclining chair ~ 7 ft CGA with RW, simulating BSC transfer. Pt was positioned for comfort in chair with BLE's elevated and 
  Problem: Physical Therapy - Adult  Goal: By Discharge: Performs mobility at highest level of function for planned discharge setting.  See evaluation for individualized goals.  Description: Physical Therapy Goals:  Initiated 5/16/2024 to be met within 7-10 days.    1.  Patient will move from supine to sit and sit to supine , scoot up and down, and roll side to side in bed with contact guard assist.    2.  Patient will transfer from bed to chair and chair to bed with supervision/set-up using the least restrictive device.  3.  Patient will perform sit to stand with supervision/set-up.  4.  Patient will ambulate with supervision/set-up for 50 feet with the least restrictive device.   5.  Patient will ascend/descend 15 stairs with unilateral handrail(s) with contact guard assist.    PLOF: lives with wife in 2 level apartment with 2 [address]ESH[address], independent without use of AD at baseline      Outcome: Progressing     PHYSICAL THERAPY EVALUATION    Patient: Julien Avila (78 y.o. male)  Date: 5/16/2024  Primary Diagnosis: Hemothorax on right [J94.2]       Precautions: General Precautions, Fall Risk,  ,  ,  ,  , Spinal Precautions: No Bending, No Lifting, No Twisting,  ,  Per [place] [facility], clamshell brace AAT (okay to don/doff brace at EOB)    ASSESSMENT :  Patient seen for PT evaluation and treatment. Received seated; agreeable. Spinal precautions and brace wear reviewed at beginning of session, with patient demonstrating good understanding. Verbally reviewed and demonstrated logroll technique to complete supine to/from sit. CGA provided for transfers and for short distance ambulation. Patient presents with overall antalgic movement quality, requiring cues for upright posture and appropriate RW use. Additional cues/education provided for PLB and to prevent valsalva maneuver with transitional tasks. Patient to benefit from acute PT services to address mobility deficits and optimize outcomes.     DEFICITS/IMPAIRMENTS:    , Body 
  Problem: Safety - Adult  Goal: Free from fall injury  5/14/2024 0951 by Lyly Gar RN  Outcome: Progressing  5/14/2024 0500 by Princess Lauren Guaman RN  Outcome: Progressing     Problem: Discharge Planning  Goal: Discharge to home or other facility with appropriate resources  5/14/2024 0951 by Lyly Gar RN  Outcome: Progressing  5/14/2024 0500 by Princess Lauren Guaman RN  Outcome: Progressing  Flowsheets (Taken 5/13/2024 2205)  Discharge to home or other facility with appropriate resources: Identify barriers to discharge with patient and caregiver     Problem: Skin/Tissue Integrity  Goal: Absence of new skin breakdown  Description: 1.  Monitor for areas of redness and/or skin breakdown  2.  Assess vascular access sites hourly  3.  Every 4-6 hours minimum:  Change oxygen saturation probe site  4.  Every 4-6 hours:  If on nasal continuous positive airway pressure, respiratory therapy assess nares and determine need for appliance change or resting period.  5/14/2024 0951 by Lyly Gar RN  Outcome: Progressing  5/14/2024 0500 by Princess Lauren Guaman RN  Outcome: Progressing     Problem: ABCDS Injury Assessment  Goal: Absence of physical injury  5/14/2024 0951 by Lyly Gar RN  Outcome: Progressing  5/14/2024 0500 by Princess Lauren Guaman RN  Outcome: Progressing  Flowsheets (Taken 5/13/2024 2213)  Absence of Physical Injury: Implement safety measures based on patient assessment     Problem: Pain  Goal: Verbalizes/displays adequate comfort level or baseline comfort level  Outcome: Progressing  Flowsheets  Taken 5/14/2024 0400 by Princess Lauren Guaman RN  Verbalizes/displays adequate comfort level or baseline comfort level:   Encourage patient to monitor pain and request assistance   Assess pain using appropriate pain scale   Implement non-pharmacological measures as appropriate and evaluate response  Taken 5/14/2024 0000 by Princess Lauren Guaman RN  Verbalizes/displays adequate comfort 
  Problem: Safety - Adult  Goal: Free from fall injury  5/16/2024 0315 by Autumn Coronado RN  Outcome: Progressing  Flowsheets (Taken 5/16/2024 0315)  Free From Fall Injury: Instruct family/caregiver on patient safety  5/15/2024 1723 by Consuelo Sawyer RN  Outcome: Progressing  Flowsheets (Taken 5/15/2024 1200)  Free From Fall Injury: Instruct family/caregiver on patient safety     Problem: Discharge Planning  Goal: Discharge to home or other facility with appropriate resources  5/16/2024 0315 by Autumn Coronado RN  Outcome: Progressing  Flowsheets (Taken 5/16/2024 0315)  Discharge to home or other facility with appropriate resources: Identify barriers to discharge with patient and caregiver  5/15/2024 1723 by Consuelo Sawyer RN  Outcome: Progressing  Flowsheets (Taken 5/15/2024 1200)  Discharge to home or other facility with appropriate resources: Identify barriers to discharge with patient and caregiver     Problem: Skin/Tissue Integrity  Goal: Absence of new skin breakdown  Description: 1.  Monitor for areas of redness and/or skin breakdown  2.  Assess vascular access sites hourly  3.  Every 4-6 hours minimum:  Change oxygen saturation probe site  4.  Every 4-6 hours:  If on nasal continuous positive airway pressure, respiratory therapy assess nares and determine need for appliance change or resting period.  5/16/2024 0315 by Autumn Coronado RN  Outcome: Progressing  5/15/2024 1723 by Consuelo Sawyer RN  Outcome: Progressing     Problem: ABCDS Injury Assessment  Goal: Absence of physical injury  5/15/2024 1723 by Consuelo Sawyer RN  Outcome: Progressing     Problem: Pain  Goal: Verbalizes/displays adequate comfort level or baseline comfort level  5/16/2024 0315 by Autumn Coronado RN  Outcome: Progressing  Flowsheets (Taken 5/16/2024 0315)  Verbalizes/displays adequate comfort level or baseline comfort level:   Encourage patient to monitor pain and request assistance   Administer analgesics based on type 
  Problem: Safety - Adult  Goal: Free from fall injury  5/19/2024 1049 by Jose Cruz Castellon, RN  Outcome: Progressing  5/19/2024 0014 by Mandy العلي RN  Outcome: Progressing     Problem: Discharge Planning  Goal: Discharge to home or other facility with appropriate resources  5/19/2024 1049 by Jose Cruz Castellon, RN  Outcome: Progressing  5/19/2024 0014 by Mandy العلي RN  Outcome: Progressing     Problem: Skin/Tissue Integrity  Goal: Absence of new skin breakdown  Description: 1.  Monitor for areas of redness and/or skin breakdown  2.  Assess vascular access sites hourly  3.  Every 4-6 hours minimum:  Change oxygen saturation probe site  4.  Every 4-6 hours:  If on nasal continuous positive airway pressure, respiratory therapy assess nares and determine need for appliance change or resting period.  5/19/2024 1049 by Jose Cruz Castellon, RN  Outcome: Progressing  5/19/2024 0014 by Mandy العلي RN  Outcome: Progressing     Problem: ABCDS Injury Assessment  Goal: Absence of physical injury  5/19/2024 1049 by Jose Cruz Castellon, RN  Outcome: Progressing  5/19/2024 0014 by Mandy العلي RN  Outcome: Progressing     Problem: Pain  Goal: Verbalizes/displays adequate comfort level or baseline comfort level  5/19/2024 1049 by Jose Cruz Castellon, RN  Outcome: Progressing  5/19/2024 0014 by Mandy العلي RN  Outcome: Progressing     
  Problem: Safety - Adult  Goal: Free from fall injury  Outcome: Progressing     Problem: Discharge Planning  Goal: Discharge to home or other facility with appropriate resources  Outcome: Progressing     Problem: Skin/Tissue Integrity  Goal: Absence of new skin breakdown  Description: 1.  Monitor for areas of redness and/or skin breakdown  2.  Assess vascular access sites hourly  3.  Every 4-6 hours minimum:  Change oxygen saturation probe site  4.  Every 4-6 hours:  If on nasal continuous positive airway pressure, respiratory therapy assess nares and determine need for appliance change or resting period.  Outcome: Progressing     Problem: ABCDS Injury Assessment  Goal: Absence of physical injury  Outcome: Progressing     Problem: Pain  Goal: Verbalizes/displays adequate comfort level or baseline comfort level  Outcome: Progressing     
  Problem: Safety - Adult  Goal: Free from fall injury  Outcome: Progressing  Flowsheets (Taken 5/15/2024 0237)  Free From Fall Injury: Instruct family/caregiver on patient safety     Problem: Safety - Adult  Goal: Free from fall injury  Outcome: Progressing  Flowsheets (Taken 5/15/2024 0237)  Free From Fall Injury: Instruct family/caregiver on patient safety     Problem: Safety - Adult  Goal: Free from fall injury  Outcome: Progressing  Flowsheets (Taken 5/15/2024 0237)  Free From Fall Injury: Instruct family/caregiver on patient safety     Problem: Discharge Planning  Goal: Discharge to home or other facility with appropriate resources  Outcome: Progressing  Flowsheets (Taken 5/15/2024 0237)  Discharge to home or other facility with appropriate resources:   Identify barriers to discharge with patient and caregiver   Arrange for needed discharge resources and transportation as appropriate   Identify discharge learning needs (meds, wound care, etc)     Problem: Skin/Tissue Integrity  Goal: Absence of new skin breakdown  Description: 1.  Monitor for areas of redness and/or skin breakdown  2.  Assess vascular access sites hourly  3.  Every 4-6 hours minimum:  Change oxygen saturation probe site  4.  Every 4-6 hours:  If on nasal continuous positive airway pressure, respiratory therapy assess nares and determine need for appliance change or resting period.  Outcome: Progressing     Problem: ABCDS Injury Assessment  Goal: Absence of physical injury  Outcome: Progressing  Flowsheets (Taken 5/13/2024 2213 by Princess Lauren Guaman, RN)  Absence of Physical Injury: Implement safety measures based on patient assessment     
  Problem: Safety - Adult  Goal: Free from fall injury  Outcome: Progressing  Flowsheets (Taken 5/15/2024 1200)  Free From Fall Injury: Instruct family/caregiver on patient safety     Problem: Discharge Planning  Goal: Discharge to home or other facility with appropriate resources  Outcome: Progressing  Flowsheets (Taken 5/15/2024 1200)  Discharge to home or other facility with appropriate resources: Identify barriers to discharge with patient and caregiver     Problem: Skin/Tissue Integrity  Goal: Absence of new skin breakdown  Description: 1.  Monitor for areas of redness and/or skin breakdown  2.  Assess vascular access sites hourly  3.  Every 4-6 hours minimum:  Change oxygen saturation probe site  4.  Every 4-6 hours:  If on nasal continuous positive airway pressure, respiratory therapy assess nares and determine need for appliance change or resting period.  Outcome: Progressing     Problem: ABCDS Injury Assessment  Goal: Absence of physical injury  Outcome: Progressing     Problem: Pain  Goal: Verbalizes/displays adequate comfort level or baseline comfort level  Outcome: Progressing     
  Problem: Safety - Adult  Goal: Free from fall injury  Outcome: Progressing  Flowsheets (Taken 5/16/2024 0315)  Free From Fall Injury: Instruct family/caregiver on patient safety     Problem: Discharge Planning  Goal: Discharge to home or other facility with appropriate resources  Outcome: Progressing  Flowsheets (Taken 5/17/2024 0007)  Discharge to home or other facility with appropriate resources:   Identify barriers to discharge with patient and caregiver   Identify discharge learning needs (meds, wound care, etc)     Problem: Skin/Tissue Integrity  Goal: Absence of new skin breakdown  Description: 1.  Monitor for areas of redness and/or skin breakdown  2.  Assess vascular access sites hourly  3.  Every 4-6 hours minimum:  Change oxygen saturation probe site  4.  Every 4-6 hours:  If on nasal continuous positive airway pressure, respiratory therapy assess nares and determine need for appliance change or resting period.  Outcome: Progressing     Problem: ABCDS Injury Assessment  Goal: Absence of physical injury  Outcome: Progressing  Flowsheets (Taken 5/13/2024 2213 by Princess Lauren Guaman RN)  Absence of Physical Injury: Implement safety measures based on patient assessment     Problem: Pain  Goal: Verbalizes/displays adequate comfort level or baseline comfort level  Outcome: Progressing  Flowsheets (Taken 5/16/2024 0315)  Verbalizes/displays adequate comfort level or baseline comfort level:   Encourage patient to monitor pain and request assistance   Administer analgesics based on type and severity of pain and evaluate response   Implement non-pharmacological measures as appropriate and evaluate response   Notify Licensed Independent Practitioner if interventions unsuccessful or patient reports new pain   Consider cultural and social influences on pain and pain management   Assess pain using appropriate pain scale     Problem: Occupational Therapy - Adult  Goal: By Discharge: Performs self-care activities at 
  Problem: Safety - Adult  Goal: Free from fall injury  Outcome: Progressing  Flowsheets (Taken 5/19/2024 2138)  Free From Fall Injury: Instruct family/caregiver on patient safety     Problem: Discharge Planning  Goal: Discharge to home or other facility with appropriate resources  Outcome: Progressing  Flowsheets (Taken 5/19/2024 2347)  Discharge to home or other facility with appropriate resources:   Identify barriers to discharge with patient and caregiver   Arrange for needed discharge resources and transportation as appropriate   Identify discharge learning needs (meds, wound care, etc)     Problem: Skin/Tissue Integrity  Goal: Absence of new skin breakdown  Description: 1.  Monitor for areas of redness and/or skin breakdown  2.  Assess vascular access sites hourly  3.  Every 4-6 hours minimum:  Change oxygen saturation probe site  4.  Every 4-6 hours:  If on nasal continuous positive airway pressure, respiratory therapy assess nares and determine need for appliance change or resting period.  Outcome: Progressing     Problem: ABCDS Injury Assessment  Goal: Absence of physical injury  Outcome: Progressing  Flowsheets (Taken 5/19/2024 2138)  Absence of Physical Injury: Implement safety measures based on patient assessment     Problem: Pain  Goal: Verbalizes/displays adequate comfort level or baseline comfort level  Outcome: Progressing  Flowsheets (Taken 5/19/2024 2138)  Verbalizes/displays adequate comfort level or baseline comfort level:   Encourage patient to monitor pain and request assistance   Assess pain using appropriate pain scale   Administer analgesics based on type and severity of pain and evaluate response     
Report received from Autumn OCHOA   Care assumed . Patient sleeping  Due meds given   Wound Prevention Checklist    Patient: Julien Avila (78 y.o. male)  Date: 5/17/2024  Diagnosis: Hemothorax on right [J94.2] Hemothorax on right    Precautions:         []  Heel prevention boots placed on patient    []  Patient turned q2h during shift    []  Lift team ordered    [x]  Patient on [place] bed/Specialty bed    [x]  Each Wound is documented during shift (Stage, Color, drainage, odor, measurements, and dressings)    [x]  Dual skin check done with Autumn Jones RN      0900 Report given to Ady OCHOA in [address] [place] [address] patient will go to [facility]   Problem: Safety - Adult  Goal: Free from fall injury  5/17/2024 1023 by Eleuterio Jones RN  Outcome: Progressing  5/17/2024 0007 by Autumn Coronado RN  Outcome: Progressing  Flowsheets (Taken 5/16/2024 0315)  Free From Fall Injury: Instruct family/caregiver on patient safety     Problem: Discharge Planning  Goal: Discharge to home or other facility with appropriate resources  5/17/2024 1023 by Eleuterio Jones RN  Outcome: Progressing  5/17/2024 0007 by Autumn Coronado RN  Outcome: Progressing  Flowsheets (Taken 5/17/2024 0007)  Discharge to home or other facility with appropriate resources:   Identify barriers to discharge with patient and caregiver   Identify discharge learning needs (meds, wound care, etc)     Problem: Skin/Tissue Integrity  Goal: Absence of new skin breakdown  Description: 1.  Monitor for areas of redness and/or skin breakdown  2.  Assess vascular access sites hourly  3.  Every 4-6 hours minimum:  Change oxygen saturation probe site  4.  Every 4-6 hours:  If on nasal continuous positive airway pressure, respiratory therapy assess nares and determine need for appliance change or resting period.  5/17/2024 1023 by Eleuterio Jones RN  Outcome: Progressing  5/17/2024 0008 by Autumn Coronado RN  Outcome: Progressing     Problem: 
RN)  Absence of Physical Injury: Implement safety measures based on patient assessment     Problem: Pain  Goal: Verbalizes/displays adequate comfort level or baseline comfort level  5/17/2024 1218 by Ady Frederick, RN  Outcome: Progressing  5/17/2024 1023 by Eleuterio Jones RN  Outcome: Progressing  Flowsheets (Taken 5/17/2024 0800)  Verbalizes/displays adequate comfort level or baseline comfort level: Encourage patient to monitor pain and request assistance  5/17/2024 0008 by Autumn Coronado, RN  Outcome: Progressing  Flowsheets (Taken 5/16/2024 0315)  Verbalizes/displays adequate comfort level or baseline comfort level:   Encourage patient to monitor pain and request assistance   Administer analgesics based on type and severity of pain and evaluate response   Implement non-pharmacological measures as appropriate and evaluate response   Notify Licensed Independent Practitioner if interventions unsuccessful or patient reports new pain   Consider cultural and social influences on pain and pain management   Assess pain using appropriate pain scale     
lateral steps towards HOB ~ 2 ft for optimal bed positioning. Pt assisted back to supine MIN A and positioned for comfort. He was left with HOB elevated, bed alarm active, and all needs within reach. RN made aware.  Progression toward goals:  []          Improving appropriately and progressing toward goals  [x]          Improving slowly and progressing toward goals  []          Not making progress toward goals and plan of care will be adjusted     PLAN:  Patient continues to benefit from skilled intervention to address the above impairments.  Continue treatment per established plan of care.    Further Equipment Recommendations for Discharge: Shower chair and RW    AMPA: AM-PAC Inpatient Daily Activity Raw Score: 16    Current research shows that an AM-PAC score of 17 or less is not associated with a discharge to the patient's home setting. Based on an AM-PAC score and their current ADL deficits; it is recommended that the patient have 5-7 sessions per week of Occupational Therapy at d/c to increase the patient's independence.  Currently, this patient demonstrates the potential endurance, and/or tolerance for 3 hours of therapy each day at d/c.      This AMPAC score should be considered in conjunction with interdisciplinary team recommendations to determine the most appropriate discharge setting. Patient's social support, diagnosis, medical stability, and prior level of function should also be taken into consideration.     SUBJECTIVE:   Patient stated, \"I get anxious at night wearing CPAP.\"    OBJECTIVE DATA SUMMARY:     Functional Mobility and Transfers for ADLs:   Bed Mobility:  Bed Mobility Training  Bed Mobility Training: Yes  Supine to Sit: Contact-guard assistance  Sit to Supine: Minimum assistance  Scooting: Contact-guard assistance   Transfers:  Transfer Training  Transfer Training: Yes  Sit to Stand: Contact-guard assistance  Stand to Sit: Contact-guard assistance    Balance:  Balance  Sitting: 
assistance  Distance (ft): 30 Feet (2x15')  Assistive Device: Walker, rolling  Base of Support: Widened  Speed/Mayra: Pace decreased (< 100 feet/min);Shuffled  Gait Abnormalities: Decreased step clearance          Therapeutic Exercises:     EXERCISE   Sets   Reps   Active Active Assist   Passive Self ROM   Comments   Ankle Pumps 2 10  [x] [] [] []    Quad Sets/Glut Sets    [] [] [] [] Hold for 5 secs   Hamstring Sets   [] [] [] []    Short Arc Quads   [] [] [] []    Heel Slides  10 [x] [] [] []    Straight Leg Raises  10 [x] [] [] []    Hip Add   [] [] [] [] Hold for 5 secs, w/ pillow squeeze   Long Arc Quads 2 10 [x] [] [] []    Seated Marching   [] [] [] []    Standing Marching   [] [] [] []       [] [] [] []        Pain:  Intensity Pre-treatment: 6/10   Intensity Post-treatment: 3/10  Scale: Numeric Rating Scale  Location:  back  Quality: Dull  Intervention(s): Nurse notified, Repositioning , and Rest      Activity Tolerance:   Activity Tolerance: Patient tolerated treatment well  Please refer to the flowsheet for vital signs taken during this treatment.  After treatment:   [x] Patient left in no apparent distress sitting up in chair  [] Patient left in no apparent distress in bed  [x] Call bell left within reach  [x] Nursing notified  [] Caregiver present  [] Bed alarm activated  [] SCDs applied      COMMUNICATION/EDUCATION:   Patient Education  Education Given To: Patient  Education Provided: Role of Therapy;Plan of Care;Precautions;Equipment  Education Method: Verbal;Teach Back;Demonstration  Barriers to Learning: None  Education Outcome: Verbalized understanding;Demonstrated understanding      Trixie Stoddard, PT  Minutes: 45

## 2024-05-21 LAB
ANION GAP SERPL CALC-SCNC: 4 MMOL/L (ref 3–18)
BASOPHILS # BLD: 0.1 K/UL (ref 0–0.1)
BASOPHILS NFR BLD: 1 % (ref 0–2)
BUN SERPL-MCNC: 9 MG/DL (ref 7–18)
BUN/CREAT SERPL: 11 (ref 12–20)
CALCIUM SERPL-MCNC: 8.9 MG/DL (ref 8.5–10.1)
CHLORIDE SERPL-SCNC: 97 MMOL/L (ref 100–111)
CO2 SERPL-SCNC: 33 MMOL/L (ref 21–32)
CREAT SERPL-MCNC: 0.84 MG/DL (ref 0.6–1.3)
DIFFERENTIAL METHOD BLD: ABNORMAL
EOSINOPHIL # BLD: 0.2 K/UL (ref 0–0.4)
EOSINOPHIL NFR BLD: 2 % (ref 0–5)
ERYTHROCYTE [DISTWIDTH] IN BLOOD BY AUTOMATED COUNT: 12.1 % (ref 11.6–14.5)
GLUCOSE SERPL-MCNC: 108 MG/DL (ref 74–99)
HCT VFR BLD AUTO: 36.3 % (ref 36–48)
HGB BLD-MCNC: 11.8 G/DL (ref 13–16)
IMM GRANULOCYTES # BLD AUTO: 0.1 K/UL (ref 0–0.04)
IMM GRANULOCYTES NFR BLD AUTO: 1 % (ref 0–0.5)
LYMPHOCYTES # BLD: 1.6 K/UL (ref 0.9–3.6)
LYMPHOCYTES NFR BLD: 16 % (ref 21–52)
MAGNESIUM SERPL-MCNC: 2.6 MG/DL (ref 1.6–2.6)
MCH RBC QN AUTO: 30.7 PG (ref 24–34)
MCHC RBC AUTO-ENTMCNC: 32.5 G/DL (ref 31–37)
MCV RBC AUTO: 94.5 FL (ref 78–100)
MONOCYTES # BLD: 0.8 K/UL (ref 0.05–1.2)
MONOCYTES NFR BLD: 8 % (ref 3–10)
NEUTS SEG # BLD: 6.9 K/UL (ref 1.8–8)
NEUTS SEG NFR BLD: 72 % (ref 40–73)
NRBC # BLD: 0 K/UL (ref 0–0.01)
NRBC BLD-RTO: 0 PER 100 WBC
PLATELET # BLD AUTO: 487 K/UL (ref 135–420)
PMV BLD AUTO: 8.8 FL (ref 9.2–11.8)
POTASSIUM SERPL-SCNC: 3.5 MMOL/L (ref 3.5–5.5)
RBC # BLD AUTO: 3.84 M/UL (ref 4.35–5.65)
SODIUM SERPL-SCNC: 134 MMOL/L (ref 136–145)
WBC # BLD AUTO: 9.6 K/UL (ref 4.6–13.2)

## 2024-05-21 PROCEDURE — 6370000000 HC RX 637 (ALT 250 FOR IP): Performed by: EMERGENCY MEDICINE

## 2024-05-21 PROCEDURE — 94761 N-INVAS EAR/PLS OXIMETRY MLT: CPT

## 2024-05-21 PROCEDURE — 99223 1ST HOSP IP/OBS HIGH 75: CPT | Performed by: EMERGENCY MEDICINE

## 2024-05-21 PROCEDURE — 97166 OT EVAL MOD COMPLEX 45 MIN: CPT

## 2024-05-21 PROCEDURE — 97162 PT EVAL MOD COMPLEX 30 MIN: CPT

## 2024-05-21 PROCEDURE — 97530 THERAPEUTIC ACTIVITIES: CPT

## 2024-05-21 PROCEDURE — 97116 GAIT TRAINING THERAPY: CPT

## 2024-05-21 PROCEDURE — 36415 COLL VENOUS BLD VENIPUNCTURE: CPT

## 2024-05-21 PROCEDURE — 2700000000 HC OXYGEN THERAPY PER DAY

## 2024-05-21 PROCEDURE — 97110 THERAPEUTIC EXERCISES: CPT

## 2024-05-21 PROCEDURE — 1180000000 HC REHAB R&B

## 2024-05-21 PROCEDURE — 83735 ASSAY OF MAGNESIUM: CPT

## 2024-05-21 PROCEDURE — 97542 WHEELCHAIR MNGMENT TRAINING: CPT

## 2024-05-21 PROCEDURE — 80048 BASIC METABOLIC PNL TOTAL CA: CPT

## 2024-05-21 PROCEDURE — 85025 COMPLETE CBC W/AUTO DIFF WBC: CPT

## 2024-05-21 PROCEDURE — 97535 SELF CARE MNGMENT TRAINING: CPT

## 2024-05-21 RX ORDER — LACTULOSE 10 G/15ML
20 SOLUTION ORAL ONCE
Status: COMPLETED | OUTPATIENT
Start: 2024-05-21 | End: 2024-05-25

## 2024-05-21 RX ORDER — SENNOSIDES A AND B 8.6 MG/1
2 TABLET, FILM COATED ORAL 2 TIMES DAILY
Status: DISCONTINUED | OUTPATIENT
Start: 2024-05-21 | End: 2024-05-31 | Stop reason: HOSPADM

## 2024-05-21 RX ADMIN — RIVAROXABAN 20 MG: 20 TABLET, FILM COATED ORAL at 16:44

## 2024-05-21 RX ADMIN — OXYCODONE HYDROCHLORIDE 10 MG: 10 TABLET ORAL at 01:10

## 2024-05-21 RX ADMIN — Medication 100 MG: at 08:17

## 2024-05-21 RX ADMIN — DULOXETINE HYDROCHLORIDE 60 MG: 30 CAPSULE, DELAYED RELEASE ORAL at 08:17

## 2024-05-21 RX ADMIN — THERA TABS 1 TABLET: TAB at 08:17

## 2024-05-21 RX ADMIN — AMIODARONE HYDROCHLORIDE 200 MG: 200 TABLET ORAL at 08:17

## 2024-05-21 RX ADMIN — PRAVASTATIN SODIUM 40 MG: 20 TABLET ORAL at 08:17

## 2024-05-21 RX ADMIN — CHOLECALCIFEROL TAB 25 MCG (1000 UNIT) 1000 UNITS: 25 TAB at 08:18

## 2024-05-21 RX ADMIN — ACETAMINOPHEN 650 MG: 325 TABLET ORAL at 16:44

## 2024-05-21 RX ADMIN — AMLODIPINE BESYLATE 5 MG: 5 TABLET ORAL at 08:17

## 2024-05-21 RX ADMIN — OXYCODONE HYDROCHLORIDE 10 MG: 10 TABLET ORAL at 08:18

## 2024-05-21 ASSESSMENT — PAIN DESCRIPTION - LOCATION
LOCATION: SHOULDER
LOCATION: SHOULDER
LOCATION: RIB CAGE

## 2024-05-21 ASSESSMENT — PAIN - FUNCTIONAL ASSESSMENT
PAIN_FUNCTIONAL_ASSESSMENT: ACTIVITIES ARE NOT PREVENTED
PAIN_FUNCTIONAL_ASSESSMENT: PREVENTS OR INTERFERES SOME ACTIVE ACTIVITIES AND ADLS
PAIN_FUNCTIONAL_ASSESSMENT: PREVENTS OR INTERFERES SOME ACTIVE ACTIVITIES AND ADLS

## 2024-05-21 ASSESSMENT — PAIN DESCRIPTION - ORIENTATION
ORIENTATION: LEFT

## 2024-05-21 ASSESSMENT — PAIN DESCRIPTION - DESCRIPTORS
DESCRIPTORS: ACHING;OTHER (COMMENT)
DESCRIPTORS: ACHING
DESCRIPTORS: ACHING

## 2024-05-21 ASSESSMENT — PAIN SCALES - GENERAL
PAINLEVEL_OUTOF10: 5
PAINLEVEL_OUTOF10: 0
PAINLEVEL_OUTOF10: 0
PAINLEVEL_OUTOF10: 6
PAINLEVEL_OUTOF10: 3
PAINLEVEL_OUTOF10: 0
PAINLEVEL_OUTOF10: 0
PAINLEVEL_OUTOF10: 2
PAINLEVEL_OUTOF10: 1
PAINLEVEL_OUTOF10: 3
PAINLEVEL_OUTOF10: 0

## 2024-05-21 ASSESSMENT — PAIN DESCRIPTION - PAIN TYPE
TYPE: ACUTE PAIN

## 2024-05-21 ASSESSMENT — PAIN DESCRIPTION - ONSET
ONSET: ON-GOING
ONSET: ON-GOING
ONSET: AWAKENED FROM SLEEP

## 2024-05-21 ASSESSMENT — PAIN DESCRIPTION - FREQUENCY
FREQUENCY: INTERMITTENT
FREQUENCY: CONTINUOUS
FREQUENCY: CONTINUOUS

## 2024-05-21 NOTE — PLAN OF CARE
Problem: Physical Therapy - Adult  Goal: By Discharge: Performs mobility at highest level of function for planned discharge setting.  See evaluation for individualized goals.  Description: Physical Therapy Short Term Goals  Initiated 5/21/2024 and to be accomplished within 7 day(s) 5/28/24  1.  Patient will perform supine to sit and sit to supine in bed with supervision/set-up.    2.  Patient will transfer from bed to chair and chair to bed with supervision/set-up using the least restrictive device.  3.  Patient will perform sit to stand with supervision/set-up  4.  Patient will ambulate with supervision/set-up for 200 feet with the least restrictive device.   5.  Patient will ascend/descend 12 stairs with left handrail(s) with supervision/set-up.  6. Patient will ascend/ descend 4 steps contact guard assistance without handrail and/or least restrictive device.    Physical Therapy Long Term Goals  Initiated 5/21/2024 and to be accomplished within 14 day(s) 6/4/24  1.  Patient will perform supine to sit and sit to supine in bed with modified independence.    2.  Patient will transfer from bed to chair and chair to bed with modified independence using the least restrictive device.  3.  Patient will perform sit to stand with modified independence.  4.  Patient will ambulate with modified independence for 300 feet with the least restrictive device over even/ uneven surfaces.   5.  Patient will ascend/descend 24 stairs with left handrail(s) with modified independence/ supervision.   6. Patient will ascend/ descend 4 steps with supervision without handrail and/or least restrictive device.  Outcome: Progressing     PHYSICAL THERAPY EVALUATION    Patient: Julien Avila (78 y.o. male)  Date: 5/21/2024  Diagnosis: Hemothorax on right [J94.2]   Precautions: General Precautions;Fall Risk (O2 via nasal cannula) No Bending;No Lifting;No Twisting Clam-shell brace when OOB            Chart, physical therapy assessment, plan of

## 2024-05-21 NOTE — PROGRESS NOTES
Advance Care Planning   Healthcare Decision Maker:    Primary Decision Maker: Garrick Avila - Child - [phone]    Secondary Decision Maker: Shae Shook - Domestic Partner - [phone]    Click here to complete Healthcare Decision Makers including selection of the Healthcare Decision Maker Relationship (ie \"Primary\").     conducted an initial consultation and Spiritual Assessment for Julien Avila, who is a 78 y.o.,male. Patient's Primary Language is: English.   According to the patient's EMR Muslim Affiliation is: Adventist.     The reason the Patient came to the hospital is:   Patient Active Problem List    Diagnosis Date Noted    Acute respiratory failure with hypoxia (HCC) 05/13/2024    Pleural effusion 05/13/2024    Hemothorax on right 05/13/2024    Obstructive sleep apnea 05/13/2024    Closed fracture of multiple ribs of left side 05/13/2024    Closed fracture of thoracic vertebra (HCC) 05/13/2024    Normocytic anemia 05/13/2024    Anxiety and depression 05/13/2024    Osteoarthritis of right hip 02/15/2021    Diverticulosis of colon 08/19/2015    Near syncope 03/30/2014    Hypopotassemia 06/06/2013    Bipolar disorder (HCC) 05/26/2013    HTN (hypertension) 05/26/2013    Cerebrovascular disease 05/26/2013    Long term (current) use of anticoagulants     LVH (left ventricular hypertrophy)     SOB (shortness of breath) 02/10/2011    Chest pain 02/09/2011    Atrial fibrillation (HCC) 02/09/2011        The  provided the following Interventions:  Initiated a relationship of care and support.   Explored issues of kendy, belief, spirituality and Episcopalian/ritual needs while hospitalized.  Listened empathically.  Provided chaplaincy education concerning Advance Medical Directive.  Provided information about Spiritual Care Services.  Offered prayer and assurance of continued prayers on patient's behalf.   Chart reviewed.    The following outcomes where achieved:  Patient shared limited  information about both their medical narrative and spiritual journey/beliefs.   confirmed Patient's Hoahaoism Affiliation.  Patient processed feeling about current hospitalization.  Patient expressed gratitude for 's visit.    Assessment:  Patient does not have any Christian/cultural needs that will affect patient's preferences in health care.  There are no spiritual or Christian issues which require intervention at this time.     Plan:  Chaplains will continue to follow and will provide pastoral care on an as needed/requested basis.   recommends bedside caregivers page  on duty if patient shows signs of acute spiritual or emotional distress.     Per patient, not feeling his best. Purpose and question why God allow this to happen in his life. Life is meaningful and patient very hopeful. Coping and adapting very well. Patient would like to receive communion. Prayer offered.       Nilesh Garcia   Staff   Spiritual Health  [phone]

## 2024-05-21 NOTE — PLAN OF CARE
Problem: Occupational Therapy - Adult  Goal: By Discharge: Performs self-care activities at highest level of function for planned discharge setting.  See evaluation for individualized goals.  Description: Occupational Therapy Goals   Long Term Goals  Initiated (2024) and to be accomplished within 2.5 week(s)  1. Pt will perform self-feeding with Mod I.  2. Pt will perform grooming with set-up.  3. Pt will perform UB bathing with set-up using AE as needed.  4. Pt will perform LB bathing with SBA using AE as needed.  5. Pt will perform tub/shower transfer with SBA using least restrictive assistive device.   6. Pt will perform UB dressing with set-up.  7. Pt will perform LB dressing with SBA using AE as needed.  8. Pt will perform toileting task with SBA.  9. Pt will perform toilet transfer with SBA using least restrictive assistive device.    Short Term Goals   Initiated (2024) and to be accomplished within 7 day(s), (to be reassessed by 2024)   1. Pt will perform self-feeding with set-up/ Mod I.   2. Pt will perform grooming with supv.  3. Pt will perform UB bathing with CGA using AE as needed.  4. Pt will perform LB bathing with Min A using AE as needed.  5. Pt will perform tub/shower transfer with CGA using least restrictive assistive device.  6. Pt will perform UB dressing with Min A.  7. Pt will perform LB dressing with Min/ Mod A using AE as needed.  8. Pt will perform toileting task with Min A.  9. Pt will perform toilet transfer with CGA/ SBA using least restrictive assistive device.    Outcome: Progressing   OCCUPATIONAL THERAPY EVALUATION    Patient: Julien Avila 78 y.o.  Date: 2024  Primary Diagnosis: Hemothorax on right [J94.2]  Patient identified with name and : yes    Past Medical History:   Past Medical History:   Diagnosis Date    Acid reflux     Adverse effect of anesthesia     hangover from anes    Arthritis     Atrial fibrillation (HCC)     Paroxysmal    Cancer (HCC)     skin    Decreased balance standing  [x]     Pain   [x]     Decreased PROM  []       Functional Limitations:   Decreased independence with ADL  [x]     Decreased independence with functional transfers  [x]     Decreased independence with ambulation  [x]     Decreased independence with IADL  [x]       Previous Functional Level & Home Environment:   Lives With: Spouse  Type of Home: House  Home Layout: Two level  Home Access: Stairs to enter without rails  Entrance Stairs - Number of Steps: 2  Entrance Stairs - Rails: None  Bathroom Shower/Tub: Tub/Shower unit  Bathroom Toilet: Standard  Bathroom Equipment: None  Bathroom Accessibility: Walker accessible  Home Equipment: Cane, Rollator  Has the patient had two or more falls in the past year or any fall with injury in the past year?: Yes  Receives Help From: Family  ADL Assistance: Independent  Homemaking Assistance: Independent  Homemaking Responsibilities: No  Ambulation Assistance: Independent  Transfer Assistance: Independent  Active : Yes  Mode of Transportation: [facility]  Occupation: Retired  Type of Occupation:     []  Right hand dominant   [x]  Left hand dominant    Outcome Measures:     MMT Initial Assessment   Right Upper Extremity  Left Upper Extremity    UE AROM RUE AROM generally decreased, functional LUE AROM generally decreased, functional; LUE AROM decreased at shoulder compared to RUE   Shoulder flexion 3+/5 3-/5   Shoulder extension 3+/5 3-/5   Elbow Flexion 3+/5 3+/5   Elbow Extension 3+/5 3+/5    3+/5 3+/5   0/5 No palpable muscle contraction  1/5 Palpable muscle contraction, no joint movement  2-/5 Less than full range of motion in gravity eliminated position  2/5 Able to complete full range of motion in gravity eliminated position  2+/5 Able to initiate movement against gravity  3-/5 More than half but not full range of motion against gravity  3/5 Able to complete full range of motion against gravity  3+/5 Completes full range of motion  (Mod A) to manage pants up/ down over hips and sacrum. Pt donned slipper socks (Min A) using crossed leg technique seated at edge of bed. Max/ total A for managing knee high compression hose (not formally assessed).     TOILETING Initial Assessment   Functional Level Moderate assistance   Clinical factors Therapist removed condom catheter on arrival to pt's room. Toileting task simulated (Mod A) for clothing management and hygiene.     TOILET TRANSFER Initial Assessment   Transfer Technique Stand step   Functional Level Contact guard assistance   Equipment Standard bedside commode   Toilet Transfer Comments Stand step xfer (CGA) using FWW; gait belt. Clam-shell back brace in use.     WHEELCHAIR/BED TRANSFER INDEPENDENCE Initial Assessment   Transfer Technique   Stand step   Level of Assistance Contact guard assistance   Equipment Wheelchair, Other (FWW; gait belt; clam-shell brace)   Comments Stand step xfer performed (CGA) using FWW; gait belt. Clam-shell brace in use.      Activity Tolerance:   Patient limited by fatigue;Patient limited by pain   Intermittent rest breaks due to fatigue. Care coordinated with GABRIELA Man regarding pain medication; staff nurse to follow-up.     EDUCATION:   Education Given To: Patient  Education Provided: Role of Therapy;Precautions;Safety;Mobility Training;ADL Function;Transfer Training;Energy Conservation;Equipment;Fall Prevention Strategies  Education Provided Comments: Patient edu regarding role of OT on inpatient rehab unit, therapy schedule, and fall prevention strategies (use of call bell to call staff for assistance and not to get up on own; use of non-skid footwear). Therapist issued a reacher and long handle bath sponge during today's treatment session.  Education Method: Demonstration;Verbal  Barriers to Learning: None  Education Outcome: Verbalized understanding;Demonstrated understanding;Continued education needed    ASSESSMENT :  Based on the objective data described

## 2024-05-21 NOTE — PROGRESS NOTES
Julien Avila is a 78 y.o.  male admitted on 5/20/2024 from [address] [place] [address]. Report received from GABRIELA Man. Transportation was provided n/a, and the patient was transferred to his room via Wheelchair. Patient was assisted to bed with assist of 1. The patient was oriented to his room. Fall risk precautions were discussed with the patient; he was instructed to call for help prior to getting up. The following fall risk precautions were initiated: bed/ chair alarms, door signage, yellow bracelet and socks. Four eyes nurse skin assessment was performed by Avelino OCHOA   and Magda OCHOA  . Skin problems noted: Yes.    AVELINO HINTON RN

## 2024-05-21 NOTE — H&P
[facility] PHYSICAL REHABILITATION  [address] [place] [id number]     INPATIENT REHABILITATION  HISTORY AND PHYSICAL      Name: Julien Avial CSN: [id number]   Age: 78 y.o.  MRN: [id number]   Sex: male Admit Date: 5/20/2024     PCP: Tommie Gillespie MD         Subjective:     Patient seen and examined.    History of the Present Illness:   This is a 78-year-old male with a past medical history of sleep apnea and atrial fibrillation (on Xarelto) and hypertension who was recently transferred to [facility] from [facility] for a right hemothorax.  During the hospitalization patient had a chest tube placed.  Hemothorax showed improvement.  Chest tube was removed.  Patient had a recent fall from a horse and also has left seventh through ninth posterior rib fractures and T6-T10 thoracic vertebral fractures.  Pain control was provided.  Patient was evaluated by PT and OT.  It was felt that patient may benefit from transitioning to the inpatient rehab facility.  For full details regarding hospital course please refer to chart    The patient  was noted to have impaired mobility and ADLs. Patient was felt to be a good candidate for acute inpatient rehabilitation. Upon evaluation by Physical Therapy and Occupational Therapy, the patient was recommended for acute inpatient rehabilitation. The patient was discharged and was subsequently admitted to the [facility] for intensive rehabilitation to help recover strength, function and mobility in the setting of right hemothorax and left seventh through ninth posterior rib fractures and T6-T10 thoracic vertebral fractures.  I personally saw and evaluated this patient face-to-face today in the inpatient rehab facility at [facility].  Patient is laying in bed in no apparent distress.  Patient denies any chest pain or shortness of breath.  No history of any fever chills or cough.  No history of any nausea  fibrillation-on Xarelto  Dyslipidemia      Willingness to participate in the program: Good      Rehabilitation Potential: Good      Plan:     1. Medical Issues being followed closely:    [x]  Fall and safety precautions     []  Wound Care     [x]  Bowel and Bladder Function     [x]  Fluid Electrolyte and Nutrition Balance     [x]  Pain Control      2. Issues that 24 hour rehabilitation nursing is following:    [x]  Fall and safety precautions     []  Wound Care     [x]  Bowel and Bladder Function     [x]  Fluid Electrolyte and Nutrition Balance     [x]  Pain Control      [x]  Assistance with and education on in-room safety with transfers to and from the bed, wheelchair, toilet and shower.      3. Acute rehabilitation plan of care:    [x]  Patient to be evaluated and treated by:           [x]  Physical Therapy           [x]  Occupational Therapy           []  Speech Therapy     []  Hold Rehab until further notice     5. Medications:    [x]  MAR Reviewed     [x]  Continue Present Medications     6. Chemical DVT Prophylaxis:      []  Enoxaparin     []  Unfractionated Heparin     []  Warfarin     [x]  NOAC     []  Aspirin     []  None     7. Mechanical DVT Prophylaxis:      []  TIRSO Stockings     []  Sequential Compression Device     [x]  None     8. GI Prophylaxis:      []  PPI     []  H2 Blocker     [x]  None / Not indicated     9. Code status:    [x]  Full code     []  Partial code     []  Do not intubate     []  Do not resuscitate         10. Rehabilitation program and expectations from patient, as well as medical issues discussed with the patient.  - The patient is being admitted to a comprehensive acute inpatient rehabilitation program  consisting of at least 180 minutes a day, 5 out of 7 days a week of  combined physical, and occupational therapy, and close supervision by a physician with special training and experience in rehabilitation medicine.    - The patient's prognosis for significant practical improvement  as appropriate.  - Social work services for patient and family counseling and safe discharge planning.       MEDICAL PLAN:    Impaired mobility and ADLs in the setting of her right hemothorax and left 7 through 9 posterior rib fractures and T6-T10 thoracic vertebral fractures   Physical therapy and Occupational Therapy  Fall precautions  Judicious pain control    Acute hypoxic respiratory failure-currently on 5 L oxygen via nasal cannula  Wean oxygen.  Encourage incentive spirometry    Obstructive sleep apnea  CPAP at bedtime    Hyponatremia  Monitor sodium intermittently    Atrial fibrillation-on Xarelto  Continue amiodarone 200 mg daily and Xarelto 20 mg daily with dinner    Dyslipidemia  Continue Pravachol 40 mg daily    Depression  Continue Cymbalta    Constipation  Continue Senokot  Glycerin suppository as needed  Add a dose of lactulose today    I discussed the care plan with the patient and he verbalized understanding and agreed.  Patient is a full code        Body mass index is 36.18 kg/m².     The domains of functional impairment present in this patient which will require an intensive and interdisciplinary rehabilitation environment include self care, mobility, motor dysfunction, bowel/bladder management, and pain management.    Estimated length of stay for this admission 14 days    Anticipated disposition: Home.  The potential to achieve that is good.        PRECAUTIONS:   1. Safety/fall precautions.   2. Deep venous thrombosis precautions.   3. Aspiration precautions.       POTENTIAL BARRIERS TO DISCHARGE:   1. Risk for falls.  2. Current home layout.  3. Family limited in ability to provide constant care.  4. Limited community resources.      RELEVANT CHANGES SINCE PREADMISSION SCREENING: I have compared the patient's medical and functional status at the time of the pre-admission screening and on this post-admission evaluation. The preadmission screen and findings from therapy evaluations both support my

## 2024-05-22 PROCEDURE — 99232 SBSQ HOSP IP/OBS MODERATE 35: CPT | Performed by: EMERGENCY MEDICINE

## 2024-05-22 PROCEDURE — 6370000000 HC RX 637 (ALT 250 FOR IP): Performed by: EMERGENCY MEDICINE

## 2024-05-22 PROCEDURE — 97535 SELF CARE MNGMENT TRAINING: CPT

## 2024-05-22 PROCEDURE — 2700000000 HC OXYGEN THERAPY PER DAY

## 2024-05-22 PROCEDURE — 97110 THERAPEUTIC EXERCISES: CPT

## 2024-05-22 PROCEDURE — 97116 GAIT TRAINING THERAPY: CPT

## 2024-05-22 PROCEDURE — 97530 THERAPEUTIC ACTIVITIES: CPT

## 2024-05-22 PROCEDURE — 1180000000 HC REHAB R&B

## 2024-05-22 PROCEDURE — 94761 N-INVAS EAR/PLS OXIMETRY MLT: CPT

## 2024-05-22 RX ADMIN — PRAVASTATIN SODIUM 40 MG: 20 TABLET ORAL at 09:03

## 2024-05-22 RX ADMIN — AMLODIPINE BESYLATE 5 MG: 5 TABLET ORAL at 09:03

## 2024-05-22 RX ADMIN — OXYCODONE HYDROCHLORIDE 10 MG: 10 TABLET ORAL at 00:05

## 2024-05-22 RX ADMIN — DULOXETINE HYDROCHLORIDE 60 MG: 30 CAPSULE, DELAYED RELEASE ORAL at 09:03

## 2024-05-22 RX ADMIN — RIVAROXABAN 20 MG: 20 TABLET, FILM COATED ORAL at 17:08

## 2024-05-22 RX ADMIN — AMIODARONE HYDROCHLORIDE 200 MG: 200 TABLET ORAL at 09:03

## 2024-05-22 RX ADMIN — ACETAMINOPHEN 650 MG: 325 TABLET ORAL at 15:44

## 2024-05-22 RX ADMIN — ACETAMINOPHEN 650 MG: 325 TABLET ORAL at 22:05

## 2024-05-22 RX ADMIN — CHOLECALCIFEROL TAB 25 MCG (1000 UNIT) 1000 UNITS: 25 TAB at 09:03

## 2024-05-22 RX ADMIN — Medication 100 MG: at 09:03

## 2024-05-22 RX ADMIN — OXYCODONE HYDROCHLORIDE 10 MG: 10 TABLET ORAL at 05:48

## 2024-05-22 RX ADMIN — SENNOSIDES 17.2 MG: 8.6 TABLET, FILM COATED ORAL at 20:39

## 2024-05-22 RX ADMIN — THERA TABS 1 TABLET: TAB at 09:03

## 2024-05-22 ASSESSMENT — PAIN DESCRIPTION - DESCRIPTORS
DESCRIPTORS: ACHING

## 2024-05-22 ASSESSMENT — PAIN SCALES - GENERAL
PAINLEVEL_OUTOF10: 3
PAINLEVEL_OUTOF10: 0
PAINLEVEL_OUTOF10: 8
PAINLEVEL_OUTOF10: 9
PAINLEVEL_OUTOF10: 0
PAINLEVEL_OUTOF10: 3
PAINLEVEL_OUTOF10: 0
PAINLEVEL_OUTOF10: 0
PAINLEVEL_OUTOF10: 5
PAINLEVEL_OUTOF10: 0

## 2024-05-22 ASSESSMENT — PAIN DESCRIPTION - FREQUENCY
FREQUENCY: INTERMITTENT

## 2024-05-22 ASSESSMENT — PAIN DESCRIPTION - PAIN TYPE
TYPE: ACUTE PAIN

## 2024-05-22 ASSESSMENT — PAIN DESCRIPTION - LOCATION
LOCATION: CHEST
LOCATION: RIB CAGE
LOCATION: RIB CAGE
LOCATION: FOOT

## 2024-05-22 ASSESSMENT — PAIN DESCRIPTION - ORIENTATION
ORIENTATION: LEFT
ORIENTATION: RIGHT

## 2024-05-22 ASSESSMENT — PAIN DESCRIPTION - ONSET
ONSET: ON-GOING
ONSET: GRADUAL
ONSET: ON-GOING

## 2024-05-22 ASSESSMENT — PAIN - FUNCTIONAL ASSESSMENT
PAIN_FUNCTIONAL_ASSESSMENT: ACTIVITIES ARE NOT PREVENTED
PAIN_FUNCTIONAL_ASSESSMENT: ACTIVITIES ARE NOT PREVENTED

## 2024-05-22 NOTE — PLAN OF CARE
Problem: Safety - Adult  Goal: Free from fall injury  5/22/2024 1103 by Coleen Serrano RN  Outcome: Progressing  5/21/2024 2242 by Tracy Shah RN  Outcome: Progressing     Problem: Pain  Goal: Verbalizes/displays adequate comfort level or baseline comfort level  5/22/2024 1103 by Coleen Serrano RN  Outcome: Progressing  5/21/2024 2242 by Tracy Shah RN  Outcome: Progressing     Problem: Skin/Tissue Integrity  Goal: Absence of new skin breakdown  Description: 1.  Monitor for areas of redness and/or skin breakdown  2.  Assess vascular access sites hourly  3.  Every 4-6 hours minimum:  Change oxygen saturation probe site  4.  Every 4-6 hours:  If on nasal continuous positive airway pressure, respiratory therapy assess nares and determine need for appliance change or resting period.  Outcome: Progressing

## 2024-05-22 NOTE — PLAN OF CARE
Problem: Occupational Therapy - Adult  Goal: By Discharge: Performs self-care activities at highest level of function for planned discharge setting.  See evaluation for individualized goals.  Description: Occupational Therapy Goals   Long Term Goals  Initiated (2024) and to be accomplished within 2.5 week(s)  1. Pt will perform self-feeding with Mod I.  2. Pt will perform grooming with set-up.  3. Pt will perform UB bathing with set-up using AE as needed.  4. Pt will perform LB bathing with SBA using AE as needed.  5. Pt will perform tub/shower transfer with SBA using least restrictive assistive device.   6. Pt will perform UB dressing with set-up.  7. Pt will perform LB dressing with SBA using AE as needed.  8. Pt will perform toileting task with SBA.  9. Pt will perform toilet transfer with SBA using least restrictive assistive device.    Short Term Goals   Initiated (2024) and to be accomplished within 7 day(s), (to be reassessed by 2024)   1. Pt will perform self-feeding with set-up/ Mod I.   2. Pt will perform grooming with supv.  3. Pt will perform UB bathing with CGA using AE as needed.  4. Pt will perform LB bathing with Min A using AE as needed.  5. Pt will perform tub/shower transfer with CGA using least restrictive assistive device.  6. Pt will perform UB dressing with Min A.  7. Pt will perform LB dressing with Min/ Mod A using AE as needed.  8. Pt will perform toileting task with Min A.  9. Pt will perform toilet transfer with CGA/ SBA using least restrictive assistive device.    Outcome: Progressing   Occupational Therapy TREATMENT    Patient: Julien Avila   78 y.o.    Patient identified with name and : yes    Date: 2024    First Tx Session  Time In: 630  Time Out: 805    Diagnosis: Hemothorax on right [J94.2]   Precautions:  Restrictions/Precautions: General Precautions, Fall Risk (O2 via nasal cannula) Spinal Precautions: No Bending, No Lifting, No Twisting  Spinal  Level: Contact guard assistance  Functional Mobility Comments: Functional mobility performed (CGA) using FWW;   Stand Step Transfers: Stand by assistance;Contact guard assistance  Sit to stand: Contact guard assistance  Stand to sit: Stand by assistance  Transfer Comments: using FWW; and min VC's for hand placement with sit to stand and safety technique      WHEELCHAIR/BED TRANSFER INDEPENDENCE Daily Assessment   Transfer Technique Stand step   Level of Assistance Contact guard assistance   Equipment Bed   Comments with FWW and CGA with clam shell brace donned     Activity Tolerance:  Patient limited by pain         EDUCATION:   Education Given To: Patient  Education Provided: Safety;Precautions;Fall Prevention Strategies;ADL Function;Energy Conservation  Education Provided Comments: Pt provided education for increased safety with showering, (cleared for showering by nursing per GABRIELA.Tracy doffing/donning prior and following clam shell brace from TTB), maintaining precautions, donning/doffing clam shell, use of AE and ADL training  Education Method: Demonstration;Verbal  Barriers to Learning: None  Education Outcome: Verbalized understanding;Demonstrated understanding;Continued education needed    ASSESSMENT:  Pt excited to take a shower today. Pt cleared by nursing to shower (per Tracy OCHOA) donning and doffing clam shell from TTB prior and following showering. Pt is increasing independence and safety following ADL training and use of AE to facilitate independence and safety with self cares. Pt requires VC's and frequent RB's and energy conservation techniques with self cares.  Progression toward goals:  [x]          Improving appropriately and progressing toward goals  []          Improving slowly and progressing toward goals  []          Not making progress toward goals and plan of care will be adjusted       PLAN:  Patient continues to benefit from skilled intervention to address the above impairments.  Continue  treatment per established plan of care.  Discharge Recommendations:   Home health; home occupational therapy with 24 hr assist  Further Equipment Recommendations for Discharge:   3-in-1 commode; tub transfer bench   Estimated LOS:6/3/24     COMMUNICATION/EDUCATION:   [] Home safety education was provided and the patient/caregiver indicated understanding.  [] Patient/family have participated as able in goal setting and plan of care.  [x] Patient/family agree to work toward stated goals and plan of care.  [] Patient understands intent and goals of therapy, but is neutral about his/her participation.  [] Patient is unable to participate in goal setting and plan of care.    Please refer to the flowsheet for vital signs taken during this treatment.  After treatment:   [x]  Patient left in no apparent distress sitting up in chair   []  Patient left in no apparent distress in bed  []  Patient handoff to SLP/PT  [x]  Call bell and immediate needs left within reach  [x]  Nursing notified (pain along spine and L chest)  []  Caregiver present  []  Bed alarm activated  []  Chair alarm activated  []  Santy Prominences offloaded  []  Heels activated for pressure relief  []  Tele/Care companion present      Entered Differentiated Treatment minutes: yes      Mandy Alonzo OT  5/22/2024

## 2024-05-22 NOTE — PLAN OF CARE
Problem: Occupational Therapy - Adult  Goal: By Discharge: Performs self-care activities at highest level of function for planned discharge setting.  See evaluation for individualized goals.  Description: Occupational Therapy Goals   Long Term Goals  Initiated (2024) and to be accomplished within 2.5 week(s)  1. Pt will perform self-feeding with Mod I.  2. Pt will perform grooming with set-up.  3. Pt will perform UB bathing with set-up using AE as needed.  4. Pt will perform LB bathing with SBA using AE as needed.  5. Pt will perform tub/shower transfer with SBA using least restrictive assistive device.   6. Pt will perform UB dressing with set-up.  7. Pt will perform LB dressing with SBA using AE as needed.  8. Pt will perform toileting task with SBA.  9. Pt will perform toilet transfer with SBA using least restrictive assistive device.    Short Term Goals   Initiated (2024) and to be accomplished within 7 day(s), (to be reassessed by 2024)   1. Pt will perform self-feeding with set-up/ Mod I.   2. Pt will perform grooming with supv.  3. Pt will perform UB bathing with CGA using AE as needed.  4. Pt will perform LB bathing with Min A using AE as needed.  5. Pt will perform tub/shower transfer with CGA using least restrictive assistive device.  6. Pt will perform UB dressing with Min A.  7. Pt will perform LB dressing with Min/ Mod A using AE as needed.  8. Pt will perform toileting task with Min A.  9. Pt will perform toilet transfer with CGA/ SBA using least restrictive assistive device.    2024 1536 by Natacha Watt, GARETT  Outcome: Progressing  Occupational Therapy TREATMENT    Patient: Julien Avila   78 y.o.  Patient identified with name and : yes  Date: 2024  First Tx Session  Time In: 1410  Time Out: 1440  Diagnosis: Hemothorax on right [J94.2]   Precautions: Restrictions/Precautions: General Precautions, Fall Risk (O2 via nasal cannula) Spinal Precautions: No Bending, No Lifting, No  ability to pull up pants over hips to waist with (CGA) using FWW. Pt required Kika to fasten drawstring on pants with clam shell brace donned. Good maintenance of spinal precautions.     FUNCTIONAL MOBILITY Daily Assessment    Functional - Mobility Device: Wheelchair  Activity: Other (to therapy gym)  Assist Level: Stand by assistance  Functional Mobility Comments: Wheelchair mobility performed (SBA) using BUE's and LE's to propel over level surface from pt's room > therapy gym; therapist managing supplemental O2 tank.   Stand Step Transfers: Stand by assistance;Contact guard assistance  Sit to stand: Contact guard assistance  Stand to sit: Stand by assistance  Transfer Comments: using FWW; and min VC's for hand placement with sit to stand and safety technique      WHEELCHAIR/BED TRANSFER INDEPENDENCE Daily Assessment   Transfer Technique Stand step   Level of Assistance Contact guard assistance;Stand by assistance   Equipment Other (FWW)   Comments with FWW and CGA/ SBA with clam shell brace donned     Activity Tolerance:  Patient limited by fatigue   Intermittent rest breaks due to fatigue.     EDUCATION:   Education Given To: Patient  Education Provided: ADL Function;Transfer Training;Energy Conservation;Mobility Training;Precautions;Safety  Education Provided Comments: Pt provided education for increased safety with showering, (cleared for showering by nursing per RN., Tracy doffing/donning prior and following clam shell brace from TTB), maintaining precautions, donning/doffing clam shell, use of AE and ADL training  Education Method: Demonstration;Verbal  Barriers to Learning: None  Education Outcome: Verbalized understanding;Demonstrated understanding;Continued education needed    ASSESSMENT:  Patient continues to benefit from skilled occupational therapy intervention to address decreased (I) with ADL's, decreased AROM, decreased functional strength/ endurance, decreased activity tolerance, pain, spinal  precautions (clam-shell brace when OOB), decreased standing tolerance, impaired balance/ coordination, safety, and impaired functional transfers/ mobility which negatively impact pt performance, independence and safety with ADL/IADL's and functional mobility for return to prior level of functioning.   Progression toward goals:  []          Improving appropriately and progressing toward goals  [x]          Improving slowly and progressing toward goals  []          Not making progress toward goals and plan of care will be adjusted      PLAN:  Patient continues to benefit from skilled intervention to address the above impairments.  Continue treatment per established plan of care.  Discharge Recommendations:   Home health; home occupational therapy with 24 hr assist  Further Equipment Recommendations for Discharge:   3-in-1 commode; tub transfer bench   Estimated LOS: TBD     COMMUNICATION/EDUCATION:   [] Home safety education was provided and the patient/caregiver indicated understanding.  [x] Patient/family have participated as able in goal setting and plan of care.  [x] Patient/family agree to work toward stated goals and plan of care.  [] Patient understands intent and goals of therapy, but is neutral about his/her participation.  [] Patient is unable to participate in goal setting and plan of care.    Please refer to the flowsheet for vital signs taken during this treatment.  After treatment:   [x]  Patient left in no apparent distress sitting up in wheelchair with needs met  []  Patient left in no apparent distress in bed  [x]  Patient handoff to LPTA in gym  []  Call bell and immediate needs left within reach  [x]  Nursing notified  []  Caregiver present  []  Bed alarm activated  [x]  Chair alarm activated  []  Santy Prominences offloaded  []  Heels activated for pressure relief  []  Telesitter/Care companion present  Entered Differentiated Treatment minutes: yes  Natacha Watt OT  5/22/2024

## 2024-05-22 NOTE — PLAN OF CARE
[facility] PHYSICAL REHABILITATION  [facility] [place] [address]     INPATIENT REHABILITATION  OVERALL PLAN OF CARE    Name: Julien Avila CSN: [id number]   Age: 78 y.o.  MRN: [id number]   Sex: male Admit Date: 5/20/2024     Primary Rehabilitation Diagnosis   Impaired mobility and ADLs in the setting of her right hemothorax and left 7 through 9 posterior rib fractures and T6-T10 thoracic vertebral fractures     Other Co-Morbid Conditions managed in Rehab      Acute hypoxic respiratory failure-currently on 5 L oxygen via nasal cannula  Obstructive sleep apnea  Hyponatremia  Atrial fibrillation-on Xarelto  Dyslipidemia     MEDICAL PLAN:     Impaired mobility and ADLs in the setting of her right hemothorax and left 7 through 9 posterior rib fractures and T6-T10 thoracic vertebral fractures   Physical therapy and Occupational Therapy  Fall precautions  Judicious pain control     Acute hypoxic respiratory failure-currently on 5 L oxygen via nasal cannula  Wean oxygen.  Encourage incentive spirometry     Obstructive sleep apnea  CPAP at bedtime     Hyponatremia  Monitor sodium intermittently     Atrial fibrillation-on Xarelto  Continue amiodarone 200 mg daily and Xarelto 20 mg daily with dinner     Dyslipidemia  Continue Pravachol 40 mg daily     Depression  Continue Cymbalta     Constipation  Continue Senokot  Glycerin suppository as needed     Obesity Body mass index is 37.51 kg/m².  Diet and lifestyle modification     I discussed care plan with the patient.       Recent Labs     05/21/24  0455   WBC 9.6   HGB 11.8*   HCT 36.3   *     Recent Labs     05/21/24  0455   *   K 3.5   CL 97*   CO2 33*   BUN 9   MG 2.6        ANTICIPATED INTERVENTIONS THAT SUPPORT THE MEDICAL NECESSITY OF THIS ADMISSION:    I. Physical Therapy              A. Intensity: 1-2 hour per day              B. Frequency: 5 times per week              C. Duration: 2 weeks              D. Short Term Goals:    Initiated  ADL/IADL training and functional transfer training.      PHYSICIAN'S ASSESSMENT OF FINDINGS:    Are the established goals sufficient for achieving the optimal level of function?    [x]  Yes      []  No    What changes would you recommend to the goals as written? None      Are the interventions noted sufficient for achieving the optimal level of function?    [x]  Yes      []  No    What changes would you recommend to the interventions noted? If therapy staff is unable to provide 3 hr of total therapy per day in 5 days due to medical issues or decreased patient tolerance, may modify treatment schedule to 15 hr/week.      Estimated length of stay: 14 days    Willingness to participate in the program: Good      Medical rehabilitation prognosis:    []  Excellent     [x]  Good     []  Fair     []  Guarded       Discharge Destination:     [x]  Home     []  Assisted Living Facility     []  Skilled Nursing Facility     []  [facility]     []  Long Term Care Facility     []  Other:       Signed:    Alberto Jain MD      May 22, 2024

## 2024-05-22 NOTE — PLAN OF CARE
Problem: Physical Therapy - Adult  Goal: By Discharge: Performs mobility at highest level of function for planned discharge setting.  See evaluation for individualized goals.  Description: Physical Therapy Short Term Goals  Initiated 5/21/2024 and to be accomplished within 7 day(s) 5/28/24  1.  Patient will perform supine to sit and sit to supine in bed with supervision/set-up.    2.  Patient will transfer from bed to chair and chair to bed with supervision/set-up using the least restrictive device.  3.  Patient will perform sit to stand with supervision/set-up  4.  Patient will ambulate with supervision/set-up for 200 feet with the least restrictive device.   5.  Patient will ascend/descend 12 stairs with left handrail(s) with supervision/set-up.  6. Patient will ascend/ descend 4 steps contact guard assistance without handrail and/or least restrictive device.    Physical Therapy Long Term Goals  Initiated 5/21/2024 and to be accomplished within 14 day(s) 6/4/24  1.  Patient will perform supine to sit and sit to supine in bed with modified independence.    2.  Patient will transfer from bed to chair and chair to bed with modified independence using the least restrictive device.  3.  Patient will perform sit to stand with modified independence.  4.  Patient will ambulate with modified independence for 300 feet with the least restrictive device over even/ uneven surfaces.   5.  Patient will ascend/descend 24 stairs with left handrail(s) with modified independence/ supervision.   6. Patient will ascend/ descend 4 steps with supervision without handrail and/or least restrictive device.  Outcome: Progressing     PHYSICAL THERAPY TREATMENT    Patient: Julien Avila (78 y.o. male)  Date: 5/22/2024  Diagnosis: Hemothorax on right [J94.2]   Precautions: spinal precautions, TLSO when out of bed  Chart, physical therapy assessment, plan of care and goals were reviewed.    Time in: 1503  Time out : 1642    Patient seen for:    Sitting - Static Good    Sitting - Dynamic Good    Standing - Static Fair    Standing - Dynamic Fair    Comments        WHEELCHAIR MOBILITY/MANAGEMENT Daily Assessment   Able to Propel 86ft   Assist Level Stand by assistance   Curbs/ramps assistance required     Wheelchair management manages B brakes   Comments      THERAPEUTIC EXERCISES Daily Assessment           Neuro Re-Education:      ASSESSMENT:    Progression toward goals:  []      Improving appropriately and progressing toward goals  []      Improving slowly and progressing toward goals  []      Not making progress toward goals and plan of care will be adjusted      PLAN:  Patient continues to benefit from skilled intervention to address the above impairments.  Continue treatment per established plan of care.  Discharge Recommendations:  Home with Home health PT  Further Equipment Recommendations for Discharge:        Rolling             Estimated Discharge Date:    Activity Tolerance:     Please refer to the flowsheet for vital signs taken during this treatment.    After treatment:   [x] Patient left in no apparent distress in bed  [] Patient left in no apparent distress sitting up in chair  [] Patient left in no apparent distress in w/c mobilizing under own power  [] Patient left in no apparent distress dining area  [] Patient left in no apparent distress mobilizing under own power  [x] Call bell left within reach  [] Nursing notified  [] Caregiver present  [] Bed alarm activated   [] Chair alarm activated        Rekha Whitaker, PTA  5/22/2024

## 2024-05-22 NOTE — PROGRESS NOTES
[facility] PHYSICAL REHABILITATION  [address] [place] [id number]     INPATIENT REHABILITATION  DAILY PROGRESS NOTE     Date: 5/22/2024    Name: Julien Avila Age / Sex: 78 y.o. / male   CSN: [id number] MRN: [id number]   Admit Date: 5/20/2024 Length of Stay: 2 days     Primary Rehabilitation Diagnosis   Impaired mobility and ADLs in the setting of her right hemothorax and left 7 through 9 posterior rib fractures and T6-T10 thoracic vertebral fractures      Subjective:     I personally saw and evaluated this patient face-to-face today.  Patient is laying in bed in no apparent distress, awake and alert.  In good spirits      Objective:     Vital Signs:  Patient Vitals for the past 24 hrs:   BP Temp Temp src Pulse Resp SpO2 Weight   05/22/24 0845 (!) 144/74 98.5 °F (36.9 °C) Oral 75 17 96 % --   05/22/24 0645 -- -- -- -- -- -- 115.2 kg (254 lb)   05/22/24 0035 -- -- -- -- 18 -- --   05/21/24 2015 128/65 98.7 °F (37.1 °C) Oral 98 19 -- --        Physical Examination:  General:  Awake, alert  Cardiovascular:  S1S2+, RRR  Pulmonary:  CTA b/l  GI:  Soft, BS+, NT, ND  Extremities:  No edema      Current Medications:  Current Facility-Administered Medications   Medication Dose Route Frequency    lactulose (CHRONULAC) 10 GM/15ML solution 20 g  20 g Oral Once    glycerin (ADULT) suppository 2 g  1 suppository Rectal Daily PRN    senna (SENOKOT) tablet 17.2 mg  2 tablet Oral BID    amiodarone (CORDARONE) tablet 200 mg  200 mg Oral Daily    amLODIPine (NORVASC) tablet 5 mg  5 mg Oral Daily    DULoxetine (CYMBALTA) extended release capsule 60 mg  60 mg Oral Daily    multivitamin 1 tablet  1 tablet Oral Daily    oxyCODONE HCl (OXY-IR) immediate release tablet 10 mg  10 mg Oral Q6H PRN    pravastatin (PRAVACHOL) tablet 40 mg  40 mg Oral Daily    naloxone (NARCAN) injection 0.4 mg  0.4 mg IntraVENous PRN    rivaroxaban (XARELTO) tablet 20 mg  20 mg Oral Dinner    thiamine mononitrate tablet 100 mg  100 mg Oral  Daily    Vitamin D (CHOLECALCIFEROL) tablet 1,000 Units  1,000 Units Oral Daily    acetaminophen (TYLENOL) tablet 650 mg  650 mg Oral Q6H PRN    polyethylene glycol (GLYCOLAX) packet 17 g  17 g Oral Daily PRN    ipratropium 0.5 mg-albuterol 2.5 mg (DUONEB) nebulizer solution 1 Dose  1 Dose Inhalation Q4H PRN       Allergies:  No Known Allergies    Lab/Data Review:  No results found for this or any previous visit (from the past 24 hour(s)).      Assessment:     Primary Rehabilitation Diagnosis   Impaired mobility and ADLs in the setting of her right hemothorax and left 7 through 9 posterior rib fractures and T6-T10 thoracic vertebral fractures     Other Co-Morbid Conditions managed in Rehab      Acute hypoxic respiratory failure-currently on 5 L oxygen via nasal cannula  Obstructive sleep apnea  Hyponatremia  Atrial fibrillation-on Xarelto  Dyslipidemia    MEDICAL PLAN:     Impaired mobility and ADLs in the setting of her right hemothorax and left 7 through 9 posterior rib fractures and T6-T10 thoracic vertebral fractures   Physical therapy and Occupational Therapy  Fall precautions  Judicious pain control     Acute hypoxic respiratory failure-currently on 5 L oxygen via nasal cannula  Wean oxygen.  Encourage incentive spirometry     Obstructive sleep apnea  CPAP at bedtime     Hyponatremia  Monitor sodium intermittently     Atrial fibrillation-on Xarelto  Continue amiodarone 200 mg daily and Xarelto 20 mg daily with dinner     Dyslipidemia  Continue Pravachol 40 mg daily     Depression  Continue Cymbalta     Constipation  Continue Senokot  Glycerin suppository as needed    Obesity Body mass index is 37.51 kg/m².  Diet and lifestyle modification    I discussed care plan with the patient.  Patient states that constipation has improved.  Patient is very appreciative of the care he is receiving in the inpatient rehab      Functional Progress:    PHYSICAL THERAPY    ON ADMISSION MOST RECENT   Balance  Balance  Posture:

## 2024-05-23 PROCEDURE — 94761 N-INVAS EAR/PLS OXIMETRY MLT: CPT

## 2024-05-23 PROCEDURE — 97112 NEUROMUSCULAR REEDUCATION: CPT

## 2024-05-23 PROCEDURE — 97542 WHEELCHAIR MNGMENT TRAINING: CPT

## 2024-05-23 PROCEDURE — 6370000000 HC RX 637 (ALT 250 FOR IP): Performed by: EMERGENCY MEDICINE

## 2024-05-23 PROCEDURE — 1180000000 HC REHAB R&B

## 2024-05-23 PROCEDURE — 97530 THERAPEUTIC ACTIVITIES: CPT

## 2024-05-23 PROCEDURE — 2700000000 HC OXYGEN THERAPY PER DAY

## 2024-05-23 PROCEDURE — 97535 SELF CARE MNGMENT TRAINING: CPT

## 2024-05-23 PROCEDURE — 99232 SBSQ HOSP IP/OBS MODERATE 35: CPT | Performed by: EMERGENCY MEDICINE

## 2024-05-23 PROCEDURE — 97110 THERAPEUTIC EXERCISES: CPT

## 2024-05-23 PROCEDURE — 97116 GAIT TRAINING THERAPY: CPT

## 2024-05-23 RX ADMIN — SENNOSIDES 17.2 MG: 8.6 TABLET, FILM COATED ORAL at 08:27

## 2024-05-23 RX ADMIN — Medication 100 MG: at 08:27

## 2024-05-23 RX ADMIN — THERA TABS 1 TABLET: TAB at 08:27

## 2024-05-23 RX ADMIN — AMIODARONE HYDROCHLORIDE 200 MG: 200 TABLET ORAL at 08:27

## 2024-05-23 RX ADMIN — CHOLECALCIFEROL TAB 25 MCG (1000 UNIT) 1000 UNITS: 25 TAB at 08:27

## 2024-05-23 RX ADMIN — PRAVASTATIN SODIUM 40 MG: 20 TABLET ORAL at 08:27

## 2024-05-23 RX ADMIN — AMLODIPINE BESYLATE 5 MG: 5 TABLET ORAL at 08:27

## 2024-05-23 RX ADMIN — RIVAROXABAN 20 MG: 20 TABLET, FILM COATED ORAL at 18:25

## 2024-05-23 RX ADMIN — OXYCODONE HYDROCHLORIDE 10 MG: 10 TABLET ORAL at 02:33

## 2024-05-23 RX ADMIN — DULOXETINE HYDROCHLORIDE 60 MG: 30 CAPSULE, DELAYED RELEASE ORAL at 08:27

## 2024-05-23 RX ADMIN — OXYCODONE HYDROCHLORIDE 10 MG: 10 TABLET ORAL at 13:58

## 2024-05-23 RX ADMIN — ACETAMINOPHEN 650 MG: 325 TABLET ORAL at 12:00

## 2024-05-23 RX ADMIN — OXYCODONE HYDROCHLORIDE 10 MG: 10 TABLET ORAL at 08:27

## 2024-05-23 RX ADMIN — SENNOSIDES 17.2 MG: 8.6 TABLET, FILM COATED ORAL at 21:10

## 2024-05-23 ASSESSMENT — PAIN DESCRIPTION - PAIN TYPE
TYPE: ACUTE PAIN
TYPE: CHRONIC PAIN

## 2024-05-23 ASSESSMENT — PAIN DESCRIPTION - LOCATION
LOCATION: BACK
LOCATION: CHEST;RIB CAGE
LOCATION: CHEST;RIB CAGE
LOCATION: OTHER (COMMENT)

## 2024-05-23 ASSESSMENT — PAIN SCALES - GENERAL
PAINLEVEL_OUTOF10: 8
PAINLEVEL_OUTOF10: 0
PAINLEVEL_OUTOF10: 5
PAINLEVEL_OUTOF10: 4
PAINLEVEL_OUTOF10: 1
PAINLEVEL_OUTOF10: 7
PAINLEVEL_OUTOF10: 7
PAINLEVEL_OUTOF10: 4
PAINLEVEL_OUTOF10: 0
PAINLEVEL_OUTOF10: 7
PAINLEVEL_OUTOF10: 5
PAINLEVEL_OUTOF10: 0
PAINLEVEL_OUTOF10: 0
PAINLEVEL_OUTOF10: 5

## 2024-05-23 ASSESSMENT — PAIN DESCRIPTION - ONSET
ONSET: ON-GOING

## 2024-05-23 ASSESSMENT — PAIN DESCRIPTION - DESCRIPTORS
DESCRIPTORS: STABBING
DESCRIPTORS: ACHING
DESCRIPTORS: STABBING
DESCRIPTORS: THROBBING

## 2024-05-23 ASSESSMENT — PAIN DESCRIPTION - FREQUENCY
FREQUENCY: INTERMITTENT

## 2024-05-23 ASSESSMENT — PAIN - FUNCTIONAL ASSESSMENT
PAIN_FUNCTIONAL_ASSESSMENT: ACTIVITIES ARE NOT PREVENTED

## 2024-05-23 ASSESSMENT — PAIN DESCRIPTION - ORIENTATION
ORIENTATION: LEFT
ORIENTATION: RIGHT
ORIENTATION: LEFT
ORIENTATION: RIGHT;MID

## 2024-05-23 NOTE — PLAN OF CARE
Problem: Occupational Therapy - Adult  Goal: By Discharge: Performs self-care activities at highest level of function for planned discharge setting.  See evaluation for individualized goals.  Description: Occupational Therapy Goals   Long Term Goals  Initiated (2024) and to be accomplished within 2.5 week(s)  1. Pt will perform self-feeding with Mod I.  2. Pt will perform grooming with set-up.  3. Pt will perform UB bathing with set-up using AE as needed.  4. Pt will perform LB bathing with SBA using AE as needed.  5. Pt will perform tub/shower transfer with SBA using least restrictive assistive device.   6. Pt will perform UB dressing with set-up.  7. Pt will perform LB dressing with SBA using AE as needed.  8. Pt will perform toileting task with SBA.  9. Pt will perform toilet transfer with SBA using least restrictive assistive device.    Short Term Goals   Initiated (2024) and to be accomplished within 7 day(s), (to be reassessed by 2024)   1. Pt will perform self-feeding with set-up/ Mod I.   2. Pt will perform grooming with supv.  3. Pt will perform UB bathing with CGA using AE as needed.  4. Pt will perform LB bathing with Min A using AE as needed.  5. Pt will perform tub/shower transfer with CGA using least restrictive assistive device.  6. Pt will perform UB dressing with Min A.  7. Pt will perform LB dressing with Min/ Mod A using AE as needed.  8. Pt will perform toileting task with Min A.  9. Pt will perform toilet transfer with CGA/ SBA using least restrictive assistive device.    Outcome: Progressing   Occupational Therapy TREATMENT    Patient: Julien Avila   78 y.o.    Patient identified with name and : yes    Date: 2024    First Tx Session  Time In: 1415  Time Out: 1443    Second Tx Session  Time In: 0940  Time Out: 1032    Third Tx Session  Time In: 1133  Time Out: 1214    Diagnosis: Hemothorax on right [J94.2]   Precautions:  Restrictions/Precautions: General Precautions,

## 2024-05-23 NOTE — PROGRESS NOTES
[facility] PHYSICAL REHABILITATION  [address] [place] [id number]     INPATIENT REHABILITATION  DAILY PROGRESS NOTE     Date: 5/23/2024    Name: Julien Avila Age / Sex: 78 y.o. / male   CSN: [id number] MRN: [id number]   Admit Date: 5/20/2024 Length of Stay: 3 days     Primary Rehabilitation Diagnosis   Impaired mobility and ADLs in the setting of her right hemothorax and left 7 through 9 posterior rib fractures and T6-T10 thoracic vertebral fractures      Subjective:     I personally saw and evaluated this patient face-to-face today.  Patient is sitting in bed in no apparent distress.  Patient appears comfortable.      Objective:     Vital Signs:  Patient Vitals for the past 24 hrs:   BP Temp Temp src Pulse Resp SpO2 Weight   05/23/24 1615 114/64 97 °F (36.1 °C) Oral 67 18 96 % --   05/23/24 1325 -- -- -- -- -- -- 112 kg (247 lb)   05/23/24 0751 (!) 177/69 97.2 °F (36.2 °C) Oral 82 18 92 % --   05/22/24 1930 110/60 97.6 °F (36.4 °C) Oral 68 18 97 % --        Physical Examination:  General:  Awake, alert  Cardiovascular:  S1S2+, RRR  Pulmonary:  CTA b/l  GI:  Soft, BS+, NT, ND  Extremities:  No edema        Current Medications:  Current Facility-Administered Medications   Medication Dose Route Frequency    lactulose (CHRONULAC) 10 GM/15ML solution 20 g  20 g Oral Once    glycerin (ADULT) suppository 2 g  1 suppository Rectal Daily PRN    senna (SENOKOT) tablet 17.2 mg  2 tablet Oral BID    amiodarone (CORDARONE) tablet 200 mg  200 mg Oral Daily    amLODIPine (NORVASC) tablet 5 mg  5 mg Oral Daily    DULoxetine (CYMBALTA) extended release capsule 60 mg  60 mg Oral Daily    multivitamin 1 tablet  1 tablet Oral Daily    oxyCODONE HCl (OXY-IR) immediate release tablet 10 mg  10 mg Oral Q6H PRN    pravastatin (PRAVACHOL) tablet 40 mg  40 mg Oral Daily    naloxone (NARCAN) injection 0.4 mg  0.4 mg IntraVENous PRN    rivaroxaban (XARELTO) tablet 20 mg  20 mg Oral Dinner    thiamine mononitrate tablet  ADMISSION MOST RECENT   Eating  Setup Eating  Feeding: Setup   Grooming  Stand by assistance Grooming  Grooming: Supervision   Bathing  UB Bathing Minimal assistance  LB Bathing Moderate assistance, Maximum assistance Bathing  UB Bathing   UE Bathing: Stand by assistance, Verbal cueing  LB Bathing   FLOW([phone]:last)@   Upper Body Dressing  Moderate assistance   Upper Body Dressing  UE Dressing: Minimal assistance   Lower Body Dressing  Moderate assistance Lower Body Dressing  LE Dressing: Minimal assistance, Adaptive equipment, Verbal cueing   Toileting  Moderate assistance Toileting  Toileting: Moderate assistance   Toilet Transfers  Contact guard assistance Toilet Transfers  Toilet Transfer: Contact guard assistance   Tub Transfers  Tub Transfers: Not tested  Tub Transfers Comments: Not tested during initial OT evaluation secondary to pain, impaired balance, impaired standing tolerance,  and safety.  SHOWER Transfers  Shower - Transfer From: Walker  Shower - Transfer Type: To and From  Shower - Transfer To: Transfer tub bench  Shower - Technique: Ambulating  Shower Transfers: Not tested  Shower Transfers Comments: Not tested during initial OT evaluation secondary to pain, impaired balance, impaired standing tolerance, and safety. Tub Transfers  Tub Transfers  Tub Transfers: Not tested  Tub Transfers Comments: Not tested during initial OT evaluation secondary to pain, impaired balance, impaired standing tolerance,  and safety.  SHOWER Transfers  Tub Transfers  Tub Transfers: Not tested  Tub Transfers Comments: Not tested during initial OT evaluation secondary to pain, impaired balance, impaired standing tolerance,  and safety.     Legend:   7 - Independent   6 - Modified Independent   5 - Standby Assistance / Supervision / Set-up   4 - Minimum Assistance / Contact Guard Assistance   3 - Moderate Assistance   2 - Maximum Assistance   1 - Total Assistance / Dependent             Plan:     1. Justification for  Term Woden Vaginal Delivery (>/= 37 weeks)

## 2024-05-23 NOTE — PROGRESS NOTES
TEAM CONFERENCE FOLLOW-UP  Patient: Julien Avila (78 y.o. male)  Date: 5/23/2024  Diagnosis: Hemothorax on right [J94.2] Hemothorax on right      Precautions:      Met with patient to discuss the findings from 5/23/2024 Team Conference.    Patient requested further information and/or had questions:   Vilma Mane

## 2024-05-23 NOTE — PROGRESS NOTES
[facility] Physical Rehabilitation  Team Conference  Date: 5/23/2024  ACTIVE MONITORING OF CO-MORBID CONDITIONS/MANAGEMENT OF NEW MEDICAL ISSUES: Hemothorax on right [J94.2]    **Please refer to patient's electronic medical record to view the care plan and goals**  NURSING Making gains Yes   Skin Care: Skin Integumentary   Skin Color: Ecchymosis (comment), Pink  Skin Condition/Temp: Dry, Warm  Skin Integrity: Erosion/denuded  Location: sacrum  Skin Fold Management: No    Wound Care: surgical sites to back       Pain: Pain Assessment  Pain Assessment: 0-10 (05/23/24 1200)  Pain Level: 5 (05/23/24 1200)  Whitlock-Rinaldi Pain Rating: No hurt (05/20/24 2000)  Patient's Stated Pain Goal: 0 - No pain (05/23/24 1200)  Pain Location: Chest;Rib cage (05/23/24 1200)  Pain Orientation: Left (05/23/24 1200)  Pain Descriptors: Stabbing (05/23/24 1200)  Functional Pain Assessment: Activities are not prevented (05/23/24 1200)  Pain Type: Acute pain (05/23/24 1200)  Pain Radiating Towards: none (05/23/24 0233)  Pain Frequency: Intermittent (05/23/24 1200)  Pain Onset: On-going (05/23/24 1200)  Non-Pharmaceutical Pain Intervention(s): Rest (05/23/24 1200)  Response to Pain Intervention: Pain improved but above pain goal (05/23/24 0900)  Side Effects: No reported side effects (05/23/24 0900)    Bladder/Bowel Urine Assessment  Urinary Status: Voiding  Urinary Incontinence: Absent  Urine Color: Yellow/straw  Urine Appearance: Clear  Urine Odor: No odor Stool Assessment  Incontinence: No  Stool Appearance: Formed, Soft  Stool Color: Brown  Stool Amount: Large  Stool Source: Rectum  Last BM (including prior to admit): 05/21/24   Goal: Patient's pain level will be less than 7/10 with activity.       Barrier: rib fracture      Intervention: frequent pain assessment, medication prn       Lab value concerns   No       Occupational Therapy  Making gains  Yes   Goal: Patient will perform all dressing tasks with exception of compression  stockings with contact guard assistance.       Barrier: spinal precautions, pain       Intervention: adl training, safety training       ADL Accessibility Limitations:none        Physical Therapy Making gains Yes   Goal: Patient will transfer with a rolling walker and supervision.       Barrier: impaired balance, activity tolerance      Intervention: balance and transfer training       Household Mobility Accessibility Limitations:3 steps to enter with no rails, 15 steps in the home                                Therapy Minutes Density Met: Yes    Therapy Minutes Not met Action/Justification:   [] Pt on medical hold  [] Pt refusing therapy despite encouragement and education on benefits of therapy  [] Pt displays decreased tolerance to therapy  [] Other     CMG Date: 5/29/2024  Estimated Discharge Date: 5/31/2024  [x]Rehabilitation goals from IPOC/Treatment plan reviewed  [x]No changes identified  []Goal(s) changed:     Patient needs identified [x]Caregiver Education   []Family Conference         Recommended Discharge Plan []home alone   [x]home alone with assist prn    []Home with continuous supervision       []Home with continuous assistance   [] Assisted living                     []SNF   Home Health ot, pt    CURRENT EQUIPMENT NEEDS:  [x]Handicap Placard Application    Equipment needed at discharge: Rolling walker    ADL Equipment:Tub bench and 3 in 1 BSC    Plan/Adjustments to Plan   [x]Medical conditions exist that require a minimum of 3 times/week physician      oversight and 24-hour rehabilitation nursing to manage/progress the plan of care   [x]Functional deficits require intensive and coordinated therapies to achieve   goals outlined in plan of care   [x]3 hours therapy 5 days/week OR 15 hours therapy over 7 days   [x]Continued plan of care as patient is showing progress and /or has an expectation to benefit    Patient's plan of care has been reviewed and/or adjusted. Continue treatment as outlined.   I

## 2024-05-23 NOTE — PLAN OF CARE
Problem: Physical Therapy - Adult  Goal: By Discharge: Performs mobility at highest level of function for planned discharge setting.  See evaluation for individualized goals.  Description: Physical Therapy Short Term Goals  Initiated 5/21/2024 and to be accomplished within 7 day(s) 5/28/24  1.  Patient will perform supine to sit and sit to supine in bed with supervision/set-up.    2.  Patient will transfer from bed to chair and chair to bed with supervision/set-up using the least restrictive device.  3.  Patient will perform sit to stand with supervision/set-up  4.  Patient will ambulate with supervision/set-up for 200 feet with the least restrictive device.   5.  Patient will ascend/descend 12 stairs with left handrail(s) with supervision/set-up.  6. Patient will ascend/ descend 4 steps contact guard assistance without handrail and/or least restrictive device.    Physical Therapy Long Term Goals  Initiated 5/21/2024 and to be accomplished within 14 day(s) 6/4/24  1.  Patient will perform supine to sit and sit to supine in bed with modified independence.    2.  Patient will transfer from bed to chair and chair to bed with modified independence using the least restrictive device.  3.  Patient will perform sit to stand with modified independence.  4.  Patient will ambulate with modified independence for 300 feet with the least restrictive device over even/ uneven surfaces.   5.  Patient will ascend/descend 24 stairs with left handrail(s) with modified independence/ supervision.   6. Patient will ascend/ descend 4 steps with supervision without handrail and/or least restrictive device.  Outcome: Progressing     PHYSICAL THERAPY TREATMENT    Patient: Julien Avila (78 y.o. male)  Date: 5/23/2024  Diagnosis: Hemothorax on right [J94.2]   Precautions: falls, spinal precautions, TLSO brace when out of bed  Chart, physical therapy assessment, plan of care and goals were reviewed.    Time in: 1030  Time out: 1130  Patient  session    Car Type N/a        GAIT Daily Assessment    Gait Deviations Slow Mayra;Decreased step length;Decreased step height    Assistive device Rolling Walker    Ambulation assistance - surface  Stand by assistance  Level tile    Distance 110 ft (55 ft, turn, 55 ft back to w/c);  then 150 ft    Comments pt initially not on O2 when PT approached pt in rehab gym. Pt stated that he was \"off of it last night and trying to wean off it\". O2 sats checked and were 90/91% on room air at rest. Pt then tried ambulating without O2 donned but readings were noted to be dropping on pulse ox so after ambulating 55 ft, PT had pt turn around and return to w/c. Pulse ox reading 86% by time pt returned to chair. Pt then placed on 4 L of O2 and reading remained >92% during gait for 150 ft. Pt educated on pulse ox readings and need for supplemental O2 still at this time during activity.         Ambulation-uneven surface   Not assessed this session           STEPS/STAIRS Daily Assessment     Steps/Stairs ambulated 6 (2 steps x 3 reps with brief standing rest between reps to allow O2 readings to return to 93%. O2 donned at 4L/min)       Assistance Required  CGA    Rail Use Left ascending (same rail used descending)    Comments  pt used 1 hand support on rail during stair training; used a reciprocal step-over-step pattern. Pt reports he has 15 steps at home with L HR only.     Curbs/Ramps  Not tested this session           BALANCE Daily Assessment    Posture      Sitting - Static Good    Sitting - Dynamic Good    Standing - Static Fair (with RW)    Standing - Dynamic Fair (with RW)    Comments        WHEELCHAIR MOBILITY/MANAGEMENT Daily Assessment   Able to Propel 100 ft   Assist Level Supervision   Curbs/ramps assistance required  NT   Wheelchair management  Managed B brakes with supervision   Comments better technique today with reciprocal pattern with LE's and coordinating both UE's and LE's     THERAPEUTIC EXERCISES Daily  Assessment     Am session: pt performed self-stretches to B heel cords with use of gait belt and LE elevated on stool, after being educated on this self-stretch method by PT.   During pm session: pt performed B LE exercises with 2# ankle weights and green t-band; 10 reps x 2 sets, seated in w/c, with supervision and demonstration from PT.  Ex's included: marches, LAQ's, hip abd/clam shells, HS curls, and ankle DF/PF      Neuro Re-Education:  Standing balance at RW challenged, as well as unilateral stance and weight shifting during sit <> stands from w/c, 5 reps x 2 sets, without using Ue's (hands on B thighs) to encourage increased activation of quad muscles to stand as well as to control descent. Pt also performed alternating foot taps on 4\" step block in standing at RW, 10 reps x 2 sets, with SBA/CGA and seated rest break between sets, to address unilateral stance, weight shifting, and standing tolerance.     ASSESSMENT:  Pt tolerated session, with O2 donned at 4L/min (maintaining between 92-97% O2 saturation levels), and frequent rest breaks due to fatigue or SOB.  Pt progressing toward goals and remains motivated/cooperative.  Pt educated on some exercises he can do on his own in his room for a HEP, with use of theraband for resistance or gait belt for assistance with achieving stretch to heel cords.   Progression toward goals:  [x]      Improving appropriately and progressing toward goals  []      Improving slowly and progressing toward goals  []      Not making progress toward goals and plan of care will be adjusted      PLAN:  Patient continues to benefit from skilled intervention to address the above impairments.  Continue treatment per established plan of care.  Discharge Recommendations:  Home with assist PRN;Home with Home health PT  Further Equipment Recommendations for Discharge:        Rolling walker             Estimated Discharge Date: 5/31/24    Activity Tolerance:   Fair +, pt limited by pain in

## 2024-05-23 NOTE — PROGRESS NOTES
Pt is a 78 year old male admitted to ARU. Pt is alert and oriented, alone in the room. Pt states that he lives with his partner, Shae, in a 2 level townhouse with 3 steps to enter and no rails. Pt states that his bedroom and bathroom - tub shower - are on the second floor. Pt states that he could have a first floor set up if needed but would only have access to a half bath.     Pt states that prior to acute injury he was able to self care and denies use of DME. Since injury pt notes that he was at [facility] and was dissatisfied with care and stated he had chest pain as a method of leaving the facility. Once in the hospital, pt and significant other advocated for acute level of care at [place]. Pt states that he has used home health and outpatient therapy in the past but does not recall the name of the agencies.     Pt states that he sees Maddy Hanson as his PCP at [facility] and fills his medications at [facility].     Pt states that his son, Garrick Avila ([phone]), is his primary POA and NOK contact. Pt states that his significant other, Shae Shook ([phone]), is his 2nd POA and NOK contact. Pt states that Shae will be his helper at [facility] and consents to scheduling caregiver education once appropriate.     Pt affirms his insurance as [employer]. Sw reviewed insurance updates and [facility] planning and date. Pt states understanding and denies questions at this time.     Sw will follow.

## 2024-05-24 PROCEDURE — 1180000000 HC REHAB R&B

## 2024-05-24 PROCEDURE — 97150 GROUP THERAPEUTIC PROCEDURES: CPT

## 2024-05-24 PROCEDURE — 97112 NEUROMUSCULAR REEDUCATION: CPT

## 2024-05-24 PROCEDURE — 97535 SELF CARE MNGMENT TRAINING: CPT

## 2024-05-24 PROCEDURE — 99232 SBSQ HOSP IP/OBS MODERATE 35: CPT | Performed by: EMERGENCY MEDICINE

## 2024-05-24 PROCEDURE — 6370000000 HC RX 637 (ALT 250 FOR IP): Performed by: EMERGENCY MEDICINE

## 2024-05-24 PROCEDURE — 97530 THERAPEUTIC ACTIVITIES: CPT

## 2024-05-24 PROCEDURE — 2700000000 HC OXYGEN THERAPY PER DAY

## 2024-05-24 PROCEDURE — 94761 N-INVAS EAR/PLS OXIMETRY MLT: CPT

## 2024-05-24 PROCEDURE — 97116 GAIT TRAINING THERAPY: CPT

## 2024-05-24 PROCEDURE — 97110 THERAPEUTIC EXERCISES: CPT

## 2024-05-24 RX ADMIN — DULOXETINE HYDROCHLORIDE 60 MG: 30 CAPSULE, DELAYED RELEASE ORAL at 08:01

## 2024-05-24 RX ADMIN — Medication 100 MG: at 08:01

## 2024-05-24 RX ADMIN — AMIODARONE HYDROCHLORIDE 200 MG: 200 TABLET ORAL at 08:01

## 2024-05-24 RX ADMIN — THERA TABS 1 TABLET: TAB at 08:01

## 2024-05-24 RX ADMIN — PRAVASTATIN SODIUM 40 MG: 20 TABLET ORAL at 08:01

## 2024-05-24 RX ADMIN — SENNOSIDES 17.2 MG: 8.6 TABLET, FILM COATED ORAL at 08:01

## 2024-05-24 RX ADMIN — POLYETHYLENE GLYCOL 3350 17 G: 17 POWDER, FOR SOLUTION ORAL at 08:02

## 2024-05-24 RX ADMIN — ACETAMINOPHEN 650 MG: 325 TABLET ORAL at 09:31

## 2024-05-24 RX ADMIN — OXYCODONE HYDROCHLORIDE 10 MG: 10 TABLET ORAL at 11:24

## 2024-05-24 RX ADMIN — CHOLECALCIFEROL TAB 25 MCG (1000 UNIT) 1000 UNITS: 25 TAB at 08:01

## 2024-05-24 RX ADMIN — AMLODIPINE BESYLATE 5 MG: 5 TABLET ORAL at 08:01

## 2024-05-24 RX ADMIN — SENNOSIDES 17.2 MG: 8.6 TABLET, FILM COATED ORAL at 21:42

## 2024-05-24 RX ADMIN — RIVAROXABAN 20 MG: 20 TABLET, FILM COATED ORAL at 17:37

## 2024-05-24 RX ADMIN — OXYCODONE HYDROCHLORIDE 10 MG: 10 TABLET ORAL at 06:13

## 2024-05-24 ASSESSMENT — PAIN DESCRIPTION - ONSET: ONSET: ON-GOING

## 2024-05-24 ASSESSMENT — PAIN DESCRIPTION - LOCATION
LOCATION: RIB CAGE
LOCATION: RIB CAGE
LOCATION: BACK

## 2024-05-24 ASSESSMENT — PAIN - FUNCTIONAL ASSESSMENT
PAIN_FUNCTIONAL_ASSESSMENT: ACTIVITIES ARE NOT PREVENTED

## 2024-05-24 ASSESSMENT — PAIN SCALES - GENERAL
PAINLEVEL_OUTOF10: 0
PAINLEVEL_OUTOF10: 5
PAINLEVEL_OUTOF10: 0
PAINLEVEL_OUTOF10: 5
PAINLEVEL_OUTOF10: 0
PAINLEVEL_OUTOF10: 9
PAINLEVEL_OUTOF10: 6

## 2024-05-24 ASSESSMENT — PAIN DESCRIPTION - DESCRIPTORS
DESCRIPTORS: STABBING
DESCRIPTORS: THROBBING
DESCRIPTORS: ACHING

## 2024-05-24 ASSESSMENT — PAIN DESCRIPTION - PAIN TYPE
TYPE: ACUTE PAIN

## 2024-05-24 ASSESSMENT — PAIN DESCRIPTION - FREQUENCY: FREQUENCY: INTERMITTENT

## 2024-05-24 ASSESSMENT — PAIN DESCRIPTION - ORIENTATION
ORIENTATION: LEFT

## 2024-05-24 NOTE — PLAN OF CARE
Problem: Occupational Therapy - Adult  Goal: By Discharge: Performs self-care activities at highest level of function for planned discharge setting.  See evaluation for individualized goals.  Description: Occupational Therapy Goals   Long Term Goals  Initiated (2024) and to be accomplished within 2.5 week(s)  1. Pt will perform self-feeding with Mod I.  2. Pt will perform grooming with set-up.  3. Pt will perform UB bathing with set-up using AE as needed.  4. Pt will perform LB bathing with SBA using AE as needed.  5. Pt will perform tub/shower transfer with SBA using least restrictive assistive device.   6. Pt will perform UB dressing with set-up.  7. Pt will perform LB dressing with SBA using AE as needed.  8. Pt will perform toileting task with SBA.  9. Pt will perform toilet transfer with SBA using least restrictive assistive device.    Short Term Goals   Initiated (2024) and to be accomplished within 7 day(s), (to be reassessed by 2024)   1. Pt will perform self-feeding with set-up/ Mod I.   2. Pt will perform grooming with supv.  3. Pt will perform UB bathing with CGA using AE as needed.  4. Pt will perform LB bathing with Min A using AE as needed.  5. Pt will perform tub/shower transfer with CGA using least restrictive assistive device.  6. Pt will perform UB dressing with Min A.  7. Pt will perform LB dressing with Min/ Mod A using AE as needed.  8. Pt will perform toileting task with Min A.  9. Pt will perform toilet transfer with CGA/ SBA using least restrictive assistive device.    Outcome: Progressing   Occupational Therapy TREATMENT    Patient: Julien Avila   78 y.o.    Patient identified with name and : yes    Date: 2024    First Tx Session  Time In: 1300  Time Out: 1354    Second Tx Session  Time In: 0803  Time Out: 0901    Diagnosis: Hemothorax on right [J94.2]   Precautions:  Restrictions/Precautions: General Precautions, Fall Risk (O2 via nasal cannula) Spinal Precautions:  treatment per established plan of care.  Discharge Recommendations:   Home health; home occupational therapy with 24 hr assist  Further Equipment Recommendations for Discharge:   3-in-1 commode; tub transfer bench   Estimated LOS:6/3/24     COMMUNICATION/EDUCATION:   [] Home safety education was provided and the patient/caregiver indicated understanding.  [] Patient/family have participated as able in goal setting and plan of care.  [x] Patient/family agree to work toward stated goals and plan of care.  [] Patient understands intent and goals of therapy, but is neutral about his/her participation.  [] Patient is unable to participate in goal setting and plan of care.    Please refer to the flowsheet for vital signs taken during this treatment.  After treatment:   [x]  Patient left in no apparent distress sitting up in chair   []  Patient left in no apparent distress in bed  []  Patient handoff to SLP/PT  [x]  Call bell and immediate needs left within reach  []  Nursing notified  []  Caregiver present  []  Bed alarm activated  []  Chair alarm activated  []  Santy Prominences offloaded  []  Heels activated for pressure relief  []  Telesitter/Care companion present      Entered Differentiated Treatment minutes: yes      Mandy Alonzo OT  5/24/2024

## 2024-05-24 NOTE — PLAN OF CARE
Problem: Safety - Adult  Goal: Free from fall injury  5/23/2024 2024 by Angle Lee RN  Outcome: Progressing  Flowsheets (Taken 5/23/2024 2022)  Free From Fall Injury:   Instruct family/caregiver on patient safety   Based on caregiver fall risk screen, instruct family/caregiver to ask for assistance with transferring infant if caregiver noted to have fall risk factors  5/23/2024 0947 by Brook Joshi RN  Outcome: Progressing  Flowsheets (Taken 5/23/2024 0827)  Free From Fall Injury: Instruct family/caregiver on patient safety     Problem: Pain  Goal: Verbalizes/displays adequate comfort level or baseline comfort level  5/23/2024 2024 by Angle Lee RN  Outcome: Progressing  Flowsheets (Taken 5/23/2024 2000)  Verbalizes/displays adequate comfort level or baseline comfort level:   Encourage patient to monitor pain and request assistance   Assess pain using appropriate pain scale   Administer analgesics based on type and severity of pain and evaluate response  5/23/2024 0947 by Brook Joshi RN  Outcome: Progressing     Problem: Skin/Tissue Integrity  Goal: Absence of new skin breakdown  Description: 1.  Monitor for areas of redness and/or skin breakdown  2.  Assess vascular access sites hourly  3.  Every 4-6 hours minimum:  Change oxygen saturation probe site  4.  Every 4-6 hours:  If on nasal continuous positive airway pressure, respiratory therapy assess nares and determine need for appliance change or resting period.  5/23/2024 2024 by Angle Lee RN  Outcome: Progressing  5/23/2024 0947 by Brook Joshi RN  Outcome: Progressing     Problem: ABCDS Injury Assessment  Goal: Absence of physical injury  Outcome: Progressing  Flowsheets  Taken 5/23/2024 2022 by Angle Lee RN  Absence of Physical Injury: Implement safety measures based on patient assessment  Taken 5/23/2024 0827 by Brook Joshi RN  Absence of Physical Injury: Implement safety measures based on patient assessment

## 2024-05-24 NOTE — PLAN OF CARE
Problem: Physical Therapy - Adult  Goal: By Discharge: Performs mobility at highest level of function for planned discharge setting.  See evaluation for individualized goals.  Description: Physical Therapy Short Term Goals  Initiated 5/21/2024 and to be accomplished within 7 day(s) 5/28/24  1.  Patient will perform supine to sit and sit to supine in bed with supervision/set-up.    2.  Patient will transfer from bed to chair and chair to bed with supervision/set-up using the least restrictive device.  3.  Patient will perform sit to stand with supervision/set-up  4.  Patient will ambulate with supervision/set-up for 200 feet with the least restrictive device.   5.  Patient will ascend/descend 12 stairs with left handrail(s) with supervision/set-up.  6. Patient will ascend/ descend 4 steps contact guard assistance without handrail and/or least restrictive device.    Physical Therapy Long Term Goals  Initiated 5/21/2024 and to be accomplished within 14 day(s) 6/4/24  1.  Patient will perform supine to sit and sit to supine in bed with modified independence.    2.  Patient will transfer from bed to chair and chair to bed with modified independence using the least restrictive device.  3.  Patient will perform sit to stand with modified independence.  4.  Patient will ambulate with modified independence for 300 feet with the least restrictive device over even/ uneven surfaces.   5.  Patient will ascend/descend 24 stairs with left handrail(s) with modified independence/ supervision.   6. Patient will ascend/ descend 4 steps with supervision without handrail and/or least restrictive device.  Outcome: Progressing     PHYSICAL THERAPY TREATMENT    Patient: Julien Avila (78 y.o. male)  Date: 5/24/2024  Diagnosis: Hemothorax on right [J94.2]   Precautions: fall risk, sternal precautions   Chart, physical therapy assessment, plan of care and goals were reviewed.    Time in: 0930  Time out : 1030    Time in: 1030 (group    Curbs/ramps assistance required     Wheelchair management     Comments Assistance required with O2 tank     THERAPEUTIC EXERCISES Daily Assessment     Sci Fit L1 x15 min BUE/LE to increase strength and endurance to facilitate improved gait pattern and distance to allow patient to return to community activities safely.       Neuro Re-Education:  -Single leg taps on 6\" step without UE support 2x10 each; CGA for safety however no LOB  -Seated R LE heel cord stretch with green TB x2 minutes    Group therapy treatment:    Seated  EXERCISE   Sets   Reps   Active Active Assist   Comments   Heel and toe raises 3 15 [x] [] 3# BLE   Long Arc Quads 3 15 [x] [] 3# BLE   Seated Marching 3 15 [x] [] 3# BLE   Hip add 3 15 [x] [] With bolster   Hip Abd w/ T-band 3 15 [x] [] Blue TB      Standing balance  Activity   Sets   Reps   Time Spent   Comments   [x] Beach ball toss/catch 2 20  Standing unsupported, no LOB   [] Balloon tapping       [] Forward/multi-directional reaching with tabletop activity        [] Bean bag toss to central target       [] Ball tossing into basketball hoop         [x]   Patient appropriate to continue group therapy sessions to promote increased participation in skilled therapy interventions and to provide opportunities for increased social interaction.     ASSESSMENT:  Patient tolerated today's sessions well with decreased pain reported in afternoon session.  Patient required several rest breaks throughout activities due to decreased endurance and shortness of breath.  Patient had 4L of O2 on during treatment and SpO2 checked several times and it did not drop below 90%. Patient reported increased pain in his ribs after completing SciFit with BUE.  Patient reports next time he would rather just do LE's.  Assisted patient into bed, he was able to don his brace independently.   Progression toward goals:  [x]      Improving appropriately and progressing toward goals  []      Improving slowly and progressing

## 2024-05-24 NOTE — PROGRESS NOTES
D (CHOLECALCIFEROL) tablet 1,000 Units  1,000 Units Oral Daily    acetaminophen (TYLENOL) tablet 650 mg  650 mg Oral Q6H PRN    polyethylene glycol (GLYCOLAX) packet 17 g  17 g Oral Daily PRN    ipratropium 0.5 mg-albuterol 2.5 mg (DUONEB) nebulizer solution 1 Dose  1 Dose Inhalation Q4H PRN       Allergies:  No Known Allergies    Lab/Data Review:  No results found for this or any previous visit (from the past 24 hour(s)).      Assessment:     Primary Rehabilitation Diagnosis   Impaired mobility and ADLs in the setting of her right hemothorax and left 7 through 9 posterior rib fractures and T6-T10 thoracic vertebral fractures     Other Co-Morbid Conditions managed in Rehab      Acute hypoxic respiratory failure-currently on 5 L oxygen via nasal cannula  Obstructive sleep apnea  Hyponatremia  Atrial fibrillation-on Xarelto  Dyslipidemia    MEDICAL PLAN:     Impaired mobility and ADLs in the setting of her right hemothorax and left 7 through 9 posterior rib fractures and T6-T10 thoracic vertebral fractures   Physical therapy and Occupational Therapy  Fall precautions  Judicious pain control     Acute hypoxic respiratory failure-currently on 5 L oxygen via nasal cannula  Wean oxygen.  Encourage incentive spirometry  5/24-I discussed with nursing staff to try to wean oxygen as per orders     Obstructive sleep apnea  CPAP at bedtime     Hyponatremia  Monitor sodium intermittently     Atrial fibrillation-on Xarelto  Continue amiodarone 200 mg daily and Xarelto 20 mg daily with dinner     Dyslipidemia  Continue Pravachol 40 mg daily     Depression  Continue Cymbalta     Constipation  Continue Senokot  Glycerin suppository as needed    Obesity Body mass index is 36.48 kg/m².  Diet and lifestyle modification    5/23-I discussed the care plan with the patient.  I discussed with staff    5/24-I discussed the care plan with the patient.  I discussed with nursing staff    Functional Progress:    PHYSICAL THERAPY    ON ADMISSION  MOST RECENT   Balance  Balance  Posture: Fair (Forward head, rounded shoulders, increased throacic kyphosis)  Sitting - Static: Good  Sitting - Dynamic: Good  Standing - Static: Fair (with RW)  Standing - Dynamic: Fair (with RW)     Good  Good  Fair (with RW)  Fair (with RW)      Balance  Balance  Posture: Fair (Forward head, rounded shoulders, increased throacic kyphosis)  Sitting - Static: Good  Sitting - Dynamic: Good  Standing - Static: Fair (with RW)  Standing - Dynamic: Fair (with RW)     Sitting - Static: Good  Sitting - Dynamic: Good  Standing - Static: Fair (with RW)  Standing - Dynamic: Fair (with RW)        Bed Mobility  Contact guard assistance  Contact guard assistance (CGA for log rolling technique; use of bed rail)  Minimal assistance  Contact guard assistance (Pt uses BUE in appropriate positioning)  Contact guard assistance (Patient requires assistance to avoid twisting (spinal precautions))  Contact guard assistance  Contact guard assistance (Pt performs to elevated car surface (bolster to simulate SUV height)) Bed Mobility  Rolling to Right: Contact guard assistance  Rolling to Left: Minimal assistance (patient unable to reach full sidelying, limited due to pain)  Supine to Sit: Minimal assistance (Requires assistance to maintain spinal precautions (twisting))  Sit to Stand: Stand by assistance  Sit to Supine: Stand by assistance  Bed to Chair: Stand by assistance  Car Transfer: Contact guard assistance (Pt performs to elevated car surface (bolster to simulate SUV height))   Wheelchair Mobility  Yes       Wheelchair Mobility  Propulsion: Yes         Ambulation  Rolling Walker  Contact guard assistance  Level tile  92 feet Ambulation  Device: Rolling Walker  Assistance: Stand by assistance  Surface: Level tile  Distance: 150 feet x2 trials with seated rest after each trial due to decreased endurance and shortness of breath   Stairs  Yes  Bilateral  Contact guard assistance (Requires VC for  / Contact Guard Assistance   3 - Moderate Assistance   2 - Maximum Assistance   1 - Total Assistance / Dependent             Plan:     1. Justification for continued stay: Fair progression towards established rehabilitation goals.  Patient is now able to ambulate 150 feet x 2 with a rolling walker and standby assist.  In comparison patient was only able to ambulate 92 feet with a rolling walker and contact-guard assistance at the time of admission    2. Medical Issues being followed closely:    [x]  Fall and safety precautions     []  Wound Care     [x]  Bowel and Bladder Function     [x]  Fluid Electrolyte and Nutrition Balance     [x]  Pain Control      3. Issues that 24 hour rehabilitation nursing is following:    [x]  Fall and safety precautions     []  Wound Care     [x]  Bowel and Bladder Function     [x]  Fluid Electrolyte and Nutrition Balance     [x]  Pain Control      [x]  Assistance with and education on in-room safety with transfers to and from the bed, wheelchair, toilet and shower.      4. Acute rehabilitation plan of care:    [x]  Continue current care and rehab.           [x]  Physical Therapy           [x]  Occupational Therapy           []  Speech Therapy      []  Hold Rehab until further notice     5. Medications:    [x]  MAR Reviewed     [x]  Continue Present Medications       6. Chemical DVT Prophylaxis:      []  Enoxaparin     []  Unfractionated Heparin     []  Warfarin     [x]  NOAC     []  Aspirin     []  None     7. Mechanical DVT Prophylaxis:      []  TIRSO Stockings     []  Sequential Compression Device     [x]  None     8. GI Prophylaxis:      []  PPI     []  H2 Blocker     [x]  None / Not indicated     9. Code status:Full     Dragon medical dictation software was used for portions of this report. Unintended errors may occur.    Personal Protective Equipment ( face mask ) was used while interacting with the patient        Signed:    Alberto Jain MD      May 24, 2024

## 2024-05-25 PROCEDURE — 1180000000 HC REHAB R&B

## 2024-05-25 PROCEDURE — 99232 SBSQ HOSP IP/OBS MODERATE 35: CPT | Performed by: EMERGENCY MEDICINE

## 2024-05-25 PROCEDURE — 97110 THERAPEUTIC EXERCISES: CPT

## 2024-05-25 PROCEDURE — 6370000000 HC RX 637 (ALT 250 FOR IP): Performed by: EMERGENCY MEDICINE

## 2024-05-25 PROCEDURE — 94761 N-INVAS EAR/PLS OXIMETRY MLT: CPT

## 2024-05-25 PROCEDURE — 97535 SELF CARE MNGMENT TRAINING: CPT

## 2024-05-25 RX ADMIN — PRAVASTATIN SODIUM 40 MG: 20 TABLET ORAL at 08:39

## 2024-05-25 RX ADMIN — ACETAMINOPHEN 650 MG: 325 TABLET ORAL at 08:44

## 2024-05-25 RX ADMIN — Medication 100 MG: at 08:39

## 2024-05-25 RX ADMIN — OXYCODONE HYDROCHLORIDE 10 MG: 10 TABLET ORAL at 16:31

## 2024-05-25 RX ADMIN — SENNOSIDES 17.2 MG: 8.6 TABLET, FILM COATED ORAL at 19:54

## 2024-05-25 RX ADMIN — POLYETHYLENE GLYCOL 3350 17 G: 17 POWDER, FOR SOLUTION ORAL at 10:52

## 2024-05-25 RX ADMIN — AMIODARONE HYDROCHLORIDE 200 MG: 200 TABLET ORAL at 08:40

## 2024-05-25 RX ADMIN — CHOLECALCIFEROL TAB 25 MCG (1000 UNIT) 1000 UNITS: 25 TAB at 08:41

## 2024-05-25 RX ADMIN — THERA TABS 1 TABLET: TAB at 08:39

## 2024-05-25 RX ADMIN — SENNOSIDES 17.2 MG: 8.6 TABLET, FILM COATED ORAL at 08:39

## 2024-05-25 RX ADMIN — DULOXETINE HYDROCHLORIDE 60 MG: 30 CAPSULE, DELAYED RELEASE ORAL at 08:39

## 2024-05-25 RX ADMIN — LACTULOSE 20 G: 20 SOLUTION ORAL at 10:42

## 2024-05-25 RX ADMIN — RIVAROXABAN 20 MG: 20 TABLET, FILM COATED ORAL at 16:32

## 2024-05-25 RX ADMIN — AMLODIPINE BESYLATE 5 MG: 5 TABLET ORAL at 08:40

## 2024-05-25 ASSESSMENT — PAIN DESCRIPTION - FREQUENCY
FREQUENCY: INTERMITTENT
FREQUENCY: CONTINUOUS

## 2024-05-25 ASSESSMENT — PAIN SCALES - GENERAL
PAINLEVEL_OUTOF10: 0
PAINLEVEL_OUTOF10: 3
PAINLEVEL_OUTOF10: 0
PAINLEVEL_OUTOF10: 6
PAINLEVEL_OUTOF10: 0

## 2024-05-25 ASSESSMENT — PAIN DESCRIPTION - DESCRIPTORS
DESCRIPTORS: ACHING
DESCRIPTORS: ACHING

## 2024-05-25 ASSESSMENT — PAIN DESCRIPTION - PAIN TYPE
TYPE: ACUTE PAIN
TYPE: ACUTE PAIN

## 2024-05-25 ASSESSMENT — PAIN DESCRIPTION - LOCATION
LOCATION: BACK
LOCATION: BACK

## 2024-05-25 ASSESSMENT — PAIN DESCRIPTION - ONSET
ONSET: GRADUAL
ONSET: GRADUAL

## 2024-05-25 ASSESSMENT — PAIN DESCRIPTION - ORIENTATION
ORIENTATION: MID
ORIENTATION: LEFT

## 2024-05-25 ASSESSMENT — PAIN SCALES - WONG BAKER: WONGBAKER_NUMERICALRESPONSE: NO HURT

## 2024-05-25 NOTE — PLAN OF CARE
Problem: Safety - Adult  Goal: Free from fall injury  5/24/2024 2035 by Angle Lee RN  Outcome: Progressing  Flowsheets (Taken 5/24/2024 2031)  Free From Fall Injury: Instruct family/caregiver on patient safety  5/24/2024 0914 by Brook Joshi RN  Outcome: Progressing  Flowsheets (Taken 5/24/2024 0735)  Free From Fall Injury: Instruct family/caregiver on patient safety     Problem: Pain  Goal: Verbalizes/displays adequate comfort level or baseline comfort level  5/24/2024 2035 by Angle Lee RN  Outcome: Progressing  Flowsheets (Taken 5/24/2024 2000)  Verbalizes/displays adequate comfort level or baseline comfort level:   Encourage patient to monitor pain and request assistance   Administer analgesics based on type and severity of pain and evaluate response   Implement non-pharmacological measures as appropriate and evaluate response   Assess pain using appropriate pain scale  5/24/2024 0914 by Brook Joshi RN  Outcome: Progressing     Problem: Skin/Tissue Integrity  Goal: Absence of new skin breakdown  Description: 1.  Monitor for areas of redness and/or skin breakdown  2.  Assess vascular access sites hourly  3.  Every 4-6 hours minimum:  Change oxygen saturation probe site  4.  Every 4-6 hours:  If on nasal continuous positive airway pressure, respiratory therapy assess nares and determine need for appliance change or resting period.  5/24/2024 2035 by Angle Lee RN  Outcome: Progressing  5/24/2024 0914 by Brook Joshi RN  Outcome: Progressing     Problem: ABCDS Injury Assessment  Goal: Absence of physical injury  5/24/2024 2035 by Angle Lee RN  Outcome: Progressing  Flowsheets (Taken 5/24/2024 2031)  Absence of Physical Injury: Implement safety measures based on patient assessment  5/24/2024 0914 by Brook Joshi RN  Outcome: Progressing  Flowsheets (Taken 5/24/2024 0735)  Absence of Physical Injury: Implement safety measures based on patient assessment

## 2024-05-25 NOTE — PROGRESS NOTES
[facility] PHYSICAL REHABILITATION  [address] [place] [id number]     INPATIENT REHABILITATION  DAILY PROGRESS NOTE     Date: 5/25/2024    Name: Julien Avila Age / Sex: 78 y.o. / male   CSN: [id number] MRN: [id number]   Admit Date: 5/20/2024 Length of Stay: 5 days     Primary Rehabilitation Diagnosis   Impaired mobility and ADLs in the setting of her right hemothorax and left 7 through 9 posterior rib fractures and T6-T10 thoracic vertebral fractures      Subjective:     I personally saw and evaluated this patient face-to-face today.  Patient is sitting in bed, awake and alert.  Patient states she is constipated and requests an enema    Objective:     Vital Signs:  Patient Vitals for the past 24 hrs:   BP Temp Temp src Pulse Resp SpO2   05/25/24 0840 (!) 151/71 -- -- -- -- --   05/25/24 0800 (!) 151/75 98 °F (36.7 °C) Oral 81 -- 93 %   05/24/24 2130 137/72 98.1 °F (36.7 °C) Oral 72 18 95 %   05/24/24 1628 (!) 121/58 97.3 °F (36.3 °C) Oral 70 16 94 %        Physical Examination:  General:  Awake, alert  Cardiovascular:  S1S2+, RRR  Pulmonary:  CTA b/l  GI:  Soft, BS+, NT, ND  Extremities:  No edema      Current Medications:  Current Facility-Administered Medications   Medication Dose Route Frequency    glycerin (ADULT) suppository 2 g  1 suppository Rectal Daily PRN    senna (SENOKOT) tablet 17.2 mg  2 tablet Oral BID    amiodarone (CORDARONE) tablet 200 mg  200 mg Oral Daily    amLODIPine (NORVASC) tablet 5 mg  5 mg Oral Daily    DULoxetine (CYMBALTA) extended release capsule 60 mg  60 mg Oral Daily    multivitamin 1 tablet  1 tablet Oral Daily    oxyCODONE HCl (OXY-IR) immediate release tablet 10 mg  10 mg Oral Q6H PRN    pravastatin (PRAVACHOL) tablet 40 mg  40 mg Oral Daily    naloxone (NARCAN) injection 0.4 mg  0.4 mg IntraVENous PRN    rivaroxaban (XARELTO) tablet 20 mg  20 mg Oral Dinner    thiamine mononitrate tablet 100 mg  100 mg Oral Daily    Vitamin D (CHOLECALCIFEROL) tablet 1,000  Units  1,000 Units Oral Daily    acetaminophen (TYLENOL) tablet 650 mg  650 mg Oral Q6H PRN    polyethylene glycol (GLYCOLAX) packet 17 g  17 g Oral Daily PRN    ipratropium 0.5 mg-albuterol 2.5 mg (DUONEB) nebulizer solution 1 Dose  1 Dose Inhalation Q4H PRN       Allergies:  No Known Allergies    Lab/Data Review:  No results found for this or any previous visit (from the past 24 hour(s)).      Assessment:     Primary Rehabilitation Diagnosis   Impaired mobility and ADLs in the setting of her right hemothorax and left 7 through 9 posterior rib fractures and T6-T10 thoracic vertebral fractures     Other Co-Morbid Conditions managed in Rehab      Acute hypoxic respiratory failure-currently on 5 L oxygen via nasal cannula  Obstructive sleep apnea  Hyponatremia  Atrial fibrillation-on Xarelto  Dyslipidemia    MEDICAL PLAN:     Impaired mobility and ADLs in the setting of her right hemothorax and left 7 through 9 posterior rib fractures and T6-T10 thoracic vertebral fractures   Physical therapy and Occupational Therapy  Fall precautions  Judicious pain control     Acute hypoxic respiratory failure-currently on 5 L oxygen via nasal cannula  Wean oxygen.  Encourage incentive spirometry  5/24-I discussed with nursing staff to try to wean oxygen as per orders     Obstructive sleep apnea  CPAP at bedtime     Hyponatremia  Monitor sodium intermittently     Atrial fibrillation-on Xarelto  Continue amiodarone 200 mg daily and Xarelto 20 mg daily with dinner     Dyslipidemia  Continue Pravachol 40 mg daily     Depression  Continue Cymbalta     Constipation  Continue Senokot  Glycerin suppository as needed    Obesity Body mass index is 36.48 kg/m².  Diet and lifestyle modification    5/23-I discussed the care plan with the patient.  I discussed with staff    5/24-I discussed the care plan with the patient.  I discussed with nursing staff    5/25-I discussed the care plan with the patient.  I have ordered a soapsuds enema.  I have

## 2024-05-25 NOTE — PLAN OF CARE
Problem: Occupational Therapy - Adult  Goal: By Discharge: Performs self-care activities at highest level of function for planned discharge setting.  See evaluation for individualized goals.  Description: Occupational Therapy Goals   Long Term Goals  Initiated (2024) and to be accomplished within 2.5 week(s)  1. Pt will perform self-feeding with Mod I.  2. Pt will perform grooming with set-up.  3. Pt will perform UB bathing with set-up using AE as needed.  4. Pt will perform LB bathing with SBA using AE as needed.  5. Pt will perform tub/shower transfer with SBA using least restrictive assistive device.   6. Pt will perform UB dressing with set-up.  7. Pt will perform LB dressing with SBA using AE as needed.  8. Pt will perform toileting task with SBA.  9. Pt will perform toilet transfer with SBA using least restrictive assistive device.    Short Term Goals   Initiated (2024) and to be accomplished within 7 day(s), (to be reassessed by 2024)   1. Pt will perform self-feeding with set-up/ Mod I.   2. Pt will perform grooming with supv.  3. Pt will perform UB bathing with CGA using AE as needed.  4. Pt will perform LB bathing with Min A using AE as needed.  5. Pt will perform tub/shower transfer with CGA using least restrictive assistive device.  6. Pt will perform UB dressing with Min A.  7. Pt will perform LB dressing with Min/ Mod A using AE as needed.  8. Pt will perform toileting task with Min A.  9. Pt will perform toilet transfer with CGA/ SBA using least restrictive assistive device.    Outcome: Progressing   Occupational Therapy TREATMENT    Patient: Julien Avila   78 y.o.    Patient identified with name and : yes    Date: 2024    First Tx Session  Time In: 0910  Time Out: 1010    Second Tx Session  Time In: 1300  Time Out: 1400    Diagnosis: Hemothorax on right [J94.2]   Precautions: fall risk- Restrictions/Precautions: General Precautions, Fall Risk (O2 via nasal cannula) Spinal  education was provided and the patient/caregiver indicated understanding.  [x] Patient/family have participated as able in goal setting and plan of care.  [] Patient/family agree to work toward stated goals and plan of care.  [] Patient understands intent and goals of therapy, but is neutral about his/her participation.  [] Patient is unable to participate in goal setting and plan of care.    Please refer to the flowsheet for vital signs taken during this treatment.  After treatment:   []  Patient left in no apparent distress sitting up in chair   [x]  Patient left in no apparent distress in bed  []  Patient handoff to SLP/PT  [x]  Call bell and immediate needs left within reach  []  Nursing notified  []  Caregiver present  []  Bed alarm activated      Entered Differentiated Treatment minutes: THEO Conteh/ZECHARIAH  5/25/2024

## 2024-05-26 VITALS
DIASTOLIC BLOOD PRESSURE: 78 MMHG | BODY MASS INDEX: 36.58 KG/M2 | TEMPERATURE: 97.8 F | HEIGHT: 69 IN | OXYGEN SATURATION: 94 % | RESPIRATION RATE: 19 BRPM | HEART RATE: 69 BPM | WEIGHT: 247 LBS | SYSTOLIC BLOOD PRESSURE: 142 MMHG

## 2024-05-26 PROCEDURE — 94761 N-INVAS EAR/PLS OXIMETRY MLT: CPT

## 2024-05-26 PROCEDURE — 6370000000 HC RX 637 (ALT 250 FOR IP): Performed by: EMERGENCY MEDICINE

## 2024-05-26 PROCEDURE — 1180000000 HC REHAB R&B

## 2024-05-26 PROCEDURE — 2700000000 HC OXYGEN THERAPY PER DAY

## 2024-05-26 RX ADMIN — AMLODIPINE BESYLATE 5 MG: 5 TABLET ORAL at 08:44

## 2024-05-26 RX ADMIN — Medication 100 MG: at 08:44

## 2024-05-26 RX ADMIN — PRAVASTATIN SODIUM 40 MG: 20 TABLET ORAL at 08:44

## 2024-05-26 RX ADMIN — OXYCODONE HYDROCHLORIDE 10 MG: 10 TABLET ORAL at 20:52

## 2024-05-26 RX ADMIN — CHOLECALCIFEROL TAB 25 MCG (1000 UNIT) 1000 UNITS: 25 TAB at 08:43

## 2024-05-26 RX ADMIN — DULOXETINE HYDROCHLORIDE 60 MG: 30 CAPSULE, DELAYED RELEASE ORAL at 08:42

## 2024-05-26 RX ADMIN — AMIODARONE HYDROCHLORIDE 200 MG: 200 TABLET ORAL at 08:43

## 2024-05-26 RX ADMIN — SENNOSIDES 17.2 MG: 8.6 TABLET, FILM COATED ORAL at 08:43

## 2024-05-26 RX ADMIN — RIVAROXABAN 20 MG: 20 TABLET, FILM COATED ORAL at 17:09

## 2024-05-26 RX ADMIN — SENNOSIDES 17.2 MG: 8.6 TABLET, FILM COATED ORAL at 20:52

## 2024-05-26 RX ADMIN — OXYCODONE HYDROCHLORIDE 10 MG: 10 TABLET ORAL at 10:59

## 2024-05-26 RX ADMIN — POLYVINYL ALCOHOL, POVIDONE 2 DROP: 14; 6 SOLUTION/ DROPS OPHTHALMIC at 11:03

## 2024-05-26 RX ADMIN — THERA TABS 1 TABLET: TAB at 08:44

## 2024-05-26 ASSESSMENT — PAIN SCALES - GENERAL
PAINLEVEL_OUTOF10: 0
PAINLEVEL_OUTOF10: 6
PAINLEVEL_OUTOF10: 0
PAINLEVEL_OUTOF10: 6

## 2024-05-26 ASSESSMENT — PAIN DESCRIPTION - LOCATION
LOCATION: BACK
LOCATION: BACK

## 2024-05-26 ASSESSMENT — PAIN DESCRIPTION - ORIENTATION
ORIENTATION: LEFT
ORIENTATION: LEFT

## 2024-05-26 ASSESSMENT — PAIN DESCRIPTION - PAIN TYPE
TYPE: ACUTE PAIN
TYPE: ACUTE PAIN

## 2024-05-26 ASSESSMENT — PAIN DESCRIPTION - FREQUENCY
FREQUENCY: INTERMITTENT
FREQUENCY: CONTINUOUS

## 2024-05-26 ASSESSMENT — PAIN DESCRIPTION - DESCRIPTORS
DESCRIPTORS: ACHING
DESCRIPTORS: ACHING

## 2024-05-26 ASSESSMENT — PAIN DESCRIPTION - ONSET
ONSET: GRADUAL
ONSET: GRADUAL

## 2024-05-27 PROCEDURE — 97535 SELF CARE MNGMENT TRAINING: CPT

## 2024-05-27 PROCEDURE — 6370000000 HC RX 637 (ALT 250 FOR IP): Performed by: EMERGENCY MEDICINE

## 2024-05-27 PROCEDURE — 97110 THERAPEUTIC EXERCISES: CPT

## 2024-05-27 PROCEDURE — 97530 THERAPEUTIC ACTIVITIES: CPT

## 2024-05-27 PROCEDURE — 1180000000 HC REHAB R&B

## 2024-05-27 PROCEDURE — 97112 NEUROMUSCULAR REEDUCATION: CPT

## 2024-05-27 PROCEDURE — 94761 N-INVAS EAR/PLS OXIMETRY MLT: CPT

## 2024-05-27 PROCEDURE — 97116 GAIT TRAINING THERAPY: CPT

## 2024-05-27 PROCEDURE — 97150 GROUP THERAPEUTIC PROCEDURES: CPT

## 2024-05-27 PROCEDURE — 2700000000 HC OXYGEN THERAPY PER DAY

## 2024-05-27 RX ADMIN — ACETAMINOPHEN 650 MG: 325 TABLET ORAL at 07:40

## 2024-05-27 RX ADMIN — DULOXETINE HYDROCHLORIDE 60 MG: 30 CAPSULE, DELAYED RELEASE ORAL at 07:39

## 2024-05-27 RX ADMIN — Medication 100 MG: at 07:41

## 2024-05-27 RX ADMIN — PRAVASTATIN SODIUM 40 MG: 20 TABLET ORAL at 07:41

## 2024-05-27 RX ADMIN — SENNOSIDES 17.2 MG: 8.6 TABLET, FILM COATED ORAL at 20:13

## 2024-05-27 RX ADMIN — THERA TABS 1 TABLET: TAB at 07:39

## 2024-05-27 RX ADMIN — POLYVINYL ALCOHOL, POVIDONE 2 DROP: 14; 6 SOLUTION/ DROPS OPHTHALMIC at 00:29

## 2024-05-27 RX ADMIN — OXYCODONE HYDROCHLORIDE 10 MG: 10 TABLET ORAL at 10:14

## 2024-05-27 RX ADMIN — AMIODARONE HYDROCHLORIDE 200 MG: 200 TABLET ORAL at 07:41

## 2024-05-27 RX ADMIN — CHOLECALCIFEROL TAB 25 MCG (1000 UNIT) 1000 UNITS: 25 TAB at 07:40

## 2024-05-27 RX ADMIN — SENNOSIDES 17.2 MG: 8.6 TABLET, FILM COATED ORAL at 07:39

## 2024-05-27 RX ADMIN — RIVAROXABAN 20 MG: 20 TABLET, FILM COATED ORAL at 17:18

## 2024-05-27 RX ADMIN — AMLODIPINE BESYLATE 5 MG: 5 TABLET ORAL at 07:41

## 2024-05-27 ASSESSMENT — PAIN DESCRIPTION - DESCRIPTORS
DESCRIPTORS: ACHING
DESCRIPTORS: ACHING

## 2024-05-27 ASSESSMENT — PAIN SCALES - GENERAL
PAINLEVEL_OUTOF10: 0
PAINLEVEL_OUTOF10: 3
PAINLEVEL_OUTOF10: 7
PAINLEVEL_OUTOF10: 0

## 2024-05-27 ASSESSMENT — PAIN DESCRIPTION - ONSET
ONSET: ON-GOING
ONSET: ON-GOING

## 2024-05-27 ASSESSMENT — PAIN DESCRIPTION - FREQUENCY
FREQUENCY: CONTINUOUS
FREQUENCY: CONTINUOUS

## 2024-05-27 ASSESSMENT — PAIN DESCRIPTION - ORIENTATION
ORIENTATION: LEFT
ORIENTATION: LEFT

## 2024-05-27 ASSESSMENT — PAIN DESCRIPTION - LOCATION
LOCATION: RIB CAGE
LOCATION: RIB CAGE

## 2024-05-27 ASSESSMENT — PAIN DESCRIPTION - PAIN TYPE
TYPE: ACUTE PAIN
TYPE: ACUTE PAIN

## 2024-05-27 NOTE — PROGRESS NOTES
Patient received communion from [employer] volunteer for Julien Avila, who is a 78 y.o.,male.      The  provided the following Interventions:  Continued the relationship of care and support.   Offered prayer and assurance of continued prayer on patients behalf.   Chart reviewed.    The following outcomes were achieved:  Patient received communion from [employer].   Patient has received sacrament of the sick and blessing from our .    Assessment:  There are no further spiritual or Voodoo issues which require Spiritual Care Services interventions at this time.     Plan:  Chaplains will continue to follow and will provide pastoral care on an as needed/requested basis.   recommends bedside caregivers page  on duty if patient shows signs of acute spiritual or emotional distress.     Liana Sheffield  Spiritual Care   [phone]

## 2024-05-27 NOTE — PLAN OF CARE
Problem: Occupational Therapy - Adult  Goal: By Discharge: Performs self-care activities at highest level of function for planned discharge setting.  See evaluation for individualized goals.  Description: Occupational Therapy Goals   Long Term Goals  Initiated (2024) and to be accomplished within 2.5 week(s)  1. Pt will perform self-feeding with Mod I.  2. Pt will perform grooming with set-up.  3. Pt will perform UB bathing with set-up using AE as needed.  4. Pt will perform LB bathing with SBA using AE as needed.  5. Pt will perform tub/shower transfer with SBA using least restrictive assistive device.   6. Pt will perform UB dressing with set-up.  7. Pt will perform LB dressing with SBA using AE as needed.  8. Pt will perform toileting task with SBA.  9. Pt will perform toilet transfer with SBA using least restrictive assistive device.    Short Term Goals   Initiated (2024) and to be accomplished within 7 day(s), (to be reassessed by 2024)   1. Pt will perform self-feeding with set-up/ Mod I.   2. Pt will perform grooming with supv.  3. Pt will perform UB bathing with CGA using AE as needed.  4. Pt will perform LB bathing with Min A using AE as needed.  5. Pt will perform tub/shower transfer with CGA using least restrictive assistive device.  6. Pt will perform UB dressing with Min A.  7. Pt will perform LB dressing with Min/ Mod A using AE as needed.  8. Pt will perform toileting task with Min A.  9. Pt will perform toilet transfer with CGA/ SBA using least restrictive assistive device.    Outcome: Progressing   Occupational Therapy TREATMENT    Patient: Julien Avila   78 y.o.    Patient identified with name and : yes    Date: 2024    First Tx Session  Time In: 0930  Time Out: 1105    Second Tx Session  Time In: 1300  Time Out: 1404    Diagnosis: Hemothorax on right [J94.2]   Precautions:  Restrictions/Precautions: General Precautions, Fall Risk (O2 via nasal cannula) Spinal Precautions:  finishing breakfast in afternoon with standard utensils and [employer].      FUNCTIONAL MOBILITY Daily Assessment    Functional - Mobility Device: Wheelchair  Activity: To/From therapy gym  Assist Level: Supervision  Functional Mobility Comments: Pt self propelled w/c with BUE and supervision. OTR issued pt w/c gloves to protect pt skin integrity of hands   Stand Step Transfers: Stand by assistance  Sit to stand: Stand by assistance;Supervision  Stand to sit: Supervision;Stand by assistance  Transfer Comments: Pt performed functional transfers and sit to  prep for transfers to FWW with SBA.      WHEELCHAIR/BED TRANSFER INDEPENDENCE Daily Assessment   Transfer Technique Stand step   Level of Assistance Stand by assistance   Equipment Bed;Wheelchair   Comments Pt transferred EOB to w/c with FWW and SBA for increased safety.     Activity Tolerance:  Patient limited by fatigue;Patient limited by pain         EDUCATION:   Education Given To: Patient  Education Provided: Energy Conservation;Safety  Education Method: Demonstration;Verbal  Barriers to Learning: None  Education Outcome: Verbalized understanding;Continued education needed    ASSESSMENT:  Pt engaged in activity as tolerated with RB's prn secondary to fatigue and pain in ribs and R shoulder. Pt demonstrated improved standing balance and stand tolerance with therac this date.   Progression toward goals:  [x]          Improving appropriately and progressing toward goals  []          Improving slowly and progressing toward goals  []          Not making progress toward goals and plan of care will be adjusted       PLAN:  Patient continues to benefit from skilled intervention to address the above impairments.  Continue treatment per established plan of care.  Discharge Recommendations:   Home health; home occupational therapy with 24 hr assist  Further Equipment Recommendations for Discharge:   3-in-1 commode; tub transfer bench   Estimated LOS:6/3/24

## 2024-05-27 NOTE — PLAN OF CARE
Problem: Physical Therapy - Adult  Goal: By Discharge: Performs mobility at highest level of function for planned discharge setting.  See evaluation for individualized goals.  Description: Physical Therapy Short Term Goals  Initiated 5/21/2024 and to be accomplished within 7 day(s) 5/28/24  1.  Patient will perform supine to sit and sit to supine in bed with supervision/set-up.    2.  Patient will transfer from bed to chair and chair to bed with supervision/set-up using the least restrictive device.  3.  Patient will perform sit to stand with supervision/set-up  4.  Patient will ambulate with supervision/set-up for 200 feet with the least restrictive device.   5.  Patient will ascend/descend 12 stairs with left handrail(s) with supervision/set-up.  6. Patient will ascend/ descend 4 steps contact guard assistance without handrail and/or least restrictive device.    Physical Therapy Long Term Goals  Initiated 5/21/2024 and to be accomplished within 14 day(s) 6/4/24  1.  Patient will perform supine to sit and sit to supine in bed with modified independence.    2.  Patient will transfer from bed to chair and chair to bed with modified independence using the least restrictive device.  3.  Patient will perform sit to stand with modified independence.  4.  Patient will ambulate with modified independence for 300 feet with the least restrictive device over even/ uneven surfaces.   5.  Patient will ascend/descend 24 stairs with left handrail(s) with modified independence/ supervision.   6. Patient will ascend/ descend 4 steps with supervision without handrail and/or least restrictive device.  5/27/2024 1333 by Halle Sood, PT  Outcome: Progressing     PHYSICAL THERAPY TREATMENT    Patient: Julien Avila (78 y.o. male)  Date: 5/27/2024  Diagnosis: Hemothorax on right [J94.2]   Precautions: falls, TLSO brace, spinal precautions (no bending, lifting, or twisting)  Chart, physical therapy assessment, plan of care and  between) without UE support and CGA from PT for steadying assist.     ASSESSMENT:  Pt tolerated session well with supplementary O2 set at 3 L/min today and O2 sats remaining between 92-97%.  Pt remains motivated and cooperative.  Pt is demonstrating sit/stand transfers with close supervision. Pt still needs to work on stair training on 12 steps in stairwell to get to his bedroom on 2nd floor.  However, was able to initiate stair training on 4\" steps today without using HR's, with use of SPC and HHA from PT, for entering and exiting his home.   Progression toward goals:  [x]      Improving appropriately and progressing toward goals  []      Improving slowly and progressing toward goals  []      Not making progress toward goals and plan of care will be adjusted      PLAN:  Patient continues to benefit from skilled intervention to address the above impairments.  Continue treatment per established plan of care.  Discharge Recommendations:  Home with assist PRN;Home with Home health PT  Further Equipment Recommendations for Discharge:    Straight Cane   Rolling walker    (cane for stair negotiation to enter/exit home without handrails)        Estimated Discharge Date: 5/31/24    Activity Tolerance:   Good, pt needs periodic rest breaks due to fatigue/decreased endurance  Please refer to the flowsheet for vital signs taken during this treatment.    After treatment:   [] Patient left in no apparent distress in bed  [x] Patient left in no apparent distress sitting up in chair  [] Patient left in no apparent distress in w/c mobilizing under own power  [] Patient left in no apparent distress dining area  [] Patient left in no apparent distress mobilizing under own power  [x] Call bell left within reach  [] Nursing notified  [] Caregiver present  [] Bed alarm activated   [x] Chair alarm activated        SHAYLA HOLDEN, PT  5/27/2024

## 2024-05-28 PROCEDURE — 1180000000 HC REHAB R&B

## 2024-05-28 PROCEDURE — 94761 N-INVAS EAR/PLS OXIMETRY MLT: CPT

## 2024-05-28 PROCEDURE — 99232 SBSQ HOSP IP/OBS MODERATE 35: CPT | Performed by: EMERGENCY MEDICINE

## 2024-05-28 PROCEDURE — 97112 NEUROMUSCULAR REEDUCATION: CPT

## 2024-05-28 PROCEDURE — 97535 SELF CARE MNGMENT TRAINING: CPT

## 2024-05-28 PROCEDURE — 97110 THERAPEUTIC EXERCISES: CPT

## 2024-05-28 PROCEDURE — 97530 THERAPEUTIC ACTIVITIES: CPT

## 2024-05-28 PROCEDURE — 97116 GAIT TRAINING THERAPY: CPT

## 2024-05-28 PROCEDURE — 2700000000 HC OXYGEN THERAPY PER DAY

## 2024-05-28 PROCEDURE — 6370000000 HC RX 637 (ALT 250 FOR IP): Performed by: EMERGENCY MEDICINE

## 2024-05-28 RX ADMIN — Medication 100 MG: at 08:38

## 2024-05-28 RX ADMIN — SENNOSIDES 17.2 MG: 8.6 TABLET, FILM COATED ORAL at 20:41

## 2024-05-28 RX ADMIN — DULOXETINE HYDROCHLORIDE 60 MG: 30 CAPSULE, DELAYED RELEASE ORAL at 08:38

## 2024-05-28 RX ADMIN — PRAVASTATIN SODIUM 40 MG: 20 TABLET ORAL at 08:38

## 2024-05-28 RX ADMIN — RIVAROXABAN 20 MG: 20 TABLET, FILM COATED ORAL at 16:43

## 2024-05-28 RX ADMIN — POLYVINYL ALCOHOL, POVIDONE 2 DROP: 14; 6 SOLUTION/ DROPS OPHTHALMIC at 22:50

## 2024-05-28 RX ADMIN — AMLODIPINE BESYLATE 5 MG: 5 TABLET ORAL at 08:38

## 2024-05-28 RX ADMIN — SENNOSIDES 17.2 MG: 8.6 TABLET, FILM COATED ORAL at 08:38

## 2024-05-28 RX ADMIN — OXYCODONE HYDROCHLORIDE 10 MG: 10 TABLET ORAL at 15:07

## 2024-05-28 RX ADMIN — THERA TABS 1 TABLET: TAB at 08:38

## 2024-05-28 RX ADMIN — OXYCODONE HYDROCHLORIDE 10 MG: 10 TABLET ORAL at 08:38

## 2024-05-28 RX ADMIN — CHOLECALCIFEROL TAB 25 MCG (1000 UNIT) 1000 UNITS: 25 TAB at 08:38

## 2024-05-28 RX ADMIN — AMIODARONE HYDROCHLORIDE 200 MG: 200 TABLET ORAL at 08:39

## 2024-05-28 ASSESSMENT — PAIN DESCRIPTION - PAIN TYPE
TYPE: ACUTE PAIN
TYPE: ACUTE PAIN

## 2024-05-28 ASSESSMENT — PAIN DESCRIPTION - FREQUENCY
FREQUENCY: CONTINUOUS
FREQUENCY: CONTINUOUS

## 2024-05-28 ASSESSMENT — PAIN SCALES - GENERAL
PAINLEVEL_OUTOF10: 0
PAINLEVEL_OUTOF10: 0
PAINLEVEL_OUTOF10: 4
PAINLEVEL_OUTOF10: 0
PAINLEVEL_OUTOF10: 5

## 2024-05-28 ASSESSMENT — PAIN DESCRIPTION - LOCATION
LOCATION: BACK
LOCATION: RIB CAGE

## 2024-05-28 ASSESSMENT — PAIN SCALES - WONG BAKER
WONGBAKER_NUMERICALRESPONSE: NO HURT
WONGBAKER_NUMERICALRESPONSE: HURTS LITTLE MORE
WONGBAKER_NUMERICALRESPONSE: NO HURT
WONGBAKER_NUMERICALRESPONSE: NO HURT

## 2024-05-28 ASSESSMENT — PAIN DESCRIPTION - ORIENTATION
ORIENTATION: UPPER
ORIENTATION: LEFT

## 2024-05-28 ASSESSMENT — PAIN DESCRIPTION - ONSET
ONSET: ON-GOING
ONSET: ON-GOING

## 2024-05-28 ASSESSMENT — PAIN DESCRIPTION - DESCRIPTORS
DESCRIPTORS: ACHING
DESCRIPTORS: ACHING

## 2024-05-28 NOTE — PLAN OF CARE
Problem: Occupational Therapy - Adult  Goal: By Discharge: Performs self-care activities at highest level of function for planned discharge setting.  See evaluation for individualized goals.  Description: Occupational Therapy Goals   Long Term Goals  Initiated (5/21/2024) and to be accomplished within 2.5 week(s)  1. Pt will perform self-feeding with Mod I. ( 5/28/24)  2. Pt will perform grooming with set-up. ( 5/28/24)  3. Pt will perform UB bathing with set-up using AE as needed.   4. Pt will perform LB bathing with SBA using AE as needed. ( 5/28/24, upgrade to setup)  5. Pt will perform tub/shower transfer with SBA using least restrictive assistive device.   6. Pt will perform UB dressing with set-up. ( 5/28/24)  7. Pt will perform LB dressing with SBA using AE as needed. ( 5/28/24, upgrade to setup)  8. Pt will perform toileting task with SBA.   9. Pt will perform toilet transfer with SBA using least restrictive assistive device.    Short Term Goals   Initiated (5/21/2024) and to be accomplished within 7 day(s), (to be reassessed by 5/28/2024)   1. Pt will perform self-feeding with set-up/ Mod I. ( 5/28/24)  2. Pt will perform grooming with supv. ( 5/28/24)  3. Pt will perform UB bathing with CGA using AE as needed. ( 5/28/24)  4. Pt will perform LB bathing with Min A using AE as needed. ( 5/28/24)  5. Pt will perform tub/shower transfer with CGA using least restrictive assistive device. ( 5/28/24)  6. Pt will perform UB dressing with Min A.  5/28/24)  7. Pt will perform LB dressing with Min/ Mod A using AE as needed. ( 5/28/24)  8. Pt will perform toileting task with Min A.  9. Pt will perform toilet transfer with CGA/ SBA using least restrictive assistive device.    Outcome: Progressing   OT WEEKLY PROGRESS NOTE  Patient Name:Julien Avila    First Treatment Session  Time In: 0630  Time Out: 0800    Medical Diagnosis:  Hemothorax on right [J94.2] Hemothorax on right     Precautions:   techniques. Pt demonstrated ability to wash buttocks and groin in standing position with SBA and good safety maintaining spinal precautions.     SHOWER TRANSFER Initial Assessment Weekly Progress Assessment 5/28/2024   Transfer Technique Ambulating Ambulating   Shower Transfer Functional Level Not tested Stand by assistance   Equipment Transfer tub bench Transfer tub bench   Shower Transfer Comments Not tested during initial OT evaluation secondary to pain, impaired balance, impaired standing tolerance, and safety. Pt performed shower transfer with FWW and SBA to tub transfer bench simulating home environment (tub shower) and good safety. Pt required assistance for managing O2 cord to decrease risks for falls.     UPPER BODY DRESSING/UNDRESSING Initial Assessment Weekly Assessment 5/28/2024   Functional Level Moderate assistance Setup   Clinical Factors Pt donned pullover shirt (SBA) seated at edge of bed; pt requiring (Mod A) to don clam-shell back brace for positioning of brace and to manage velcro straps. Pt performed UB dressing from seated position with setup and good safety.     LOWER BODY DRESSING/UNDRESSING Initial Assessment Weekly5/28/2024   Functional Level Moderate assistance Supervision;Stand by assistance   Clinical Factors Patient performed LB dressing (Mod A) seated at edge of bed and in stance using FWW. Pt requiring (Mod A) to thread pant legs over distal BLE's. Pt stood (CGA) using FWW with (Mod A) to manage pants up/ down over hips and sacrum. Pt donned slipper socks (Min A) using crossed leg technique seated at edge of bed. Max/ total A for managing knee high compression hose (not formally assessed). Pt performed LB dressing from seated position to thread BLE feet through pants using arabella sit and good safety. Pt demonstrated ability to pull up pants up over buttocks at FWW in standing with SBA/supervision     TOILETING Initial Assessment Weekly Progress Assessment 5/28/2024   Functional Level

## 2024-05-28 NOTE — PLAN OF CARE
Problem: Safety - Adult  Goal: Free from fall injury  5/28/2024 0933 by Emmanuel Winters RN  Outcome: Progressing  5/27/2024 2142 by Tracy Shah RN  Outcome: Progressing     Problem: Pain  Goal: Verbalizes/displays adequate comfort level or baseline comfort level  5/28/2024 0933 by Emmanuel Winters RN  Outcome: Progressing  5/27/2024 2142 by Tracy Shah RN  Outcome: Progressing     Problem: Skin/Tissue Integrity  Goal: Absence of new skin breakdown  Description: 1.  Monitor for areas of redness and/or skin breakdown  2.  Assess vascular access sites hourly  3.  Every 4-6 hours minimum:  Change oxygen saturation probe site  4.  Every 4-6 hours:  If on nasal continuous positive airway pressure, respiratory therapy assess nares and determine need for appliance change or resting period.  5/27/2024 2142 by Tracy Shah RN  Outcome: Progressing     Problem: ABCDS Injury Assessment  Goal: Absence of physical injury  5/27/2024 2142 by Tracy Shah RN  Outcome: Progressing

## 2024-05-28 NOTE — PROGRESS NOTES
Pt asked that sw speak with significant other. Sw answered questions and reviewed dc plan. Significant other declined to schedule caregiver education noting that she has family/friends coming into town. Significant other reports that pt's son will likely be picking him up later in the day Friday.     Sw will follow.

## 2024-05-28 NOTE — PLAN OF CARE
Walker   Ambulation assistance - surface Contact guard assistance  Level tile Supervision  Level tile     Distance 92 feet 300 ft (150 ft x 2 reps consecutively without rest break)   Comments pt initially not on O2 when PT approached pt in rehab gym.  Pt stated that he was \"off of it last night and trying to wean off it\".  O2 sats checked and were 90/91% on room air at rest.  Pt then tried ambulating without O2 donned but readings were noted to be dropping on pulse ox so after ambulating 55 ft, PT had pt turn around and return to w/c.  Pulse ox reading 86% by time pt returned to chair.  Pt then placed on 4 L of O2 and reading remained >92% during gait for 150 ft.  Pt educated on pulse ox readings and need for supplemental O2 still at this time during activity. pt's O2 sat level 91% on 2 L of O2 after during gait   Ambulation  150 feet; 86 feet  Stand by assistance;Contact guard assistance  Single point cane       STEPS/STAIRS Initial Assessment Weekly Progress Assessment 5/28/2024   Steps/Stairs ambulated 6  6\" 20 (x2 reps with brief standing rest break between)   (4\" and 6\")   Rail Use Bilateral   None     Assistance Level Contact guard assistance (Requires VC for sequencing) Contact guard assistance   Comments pt used 1 hand support on rail during stair training; used a reciprocal step-over-step pattern.  Pt reports he has 15 steps at home with L HR only. too warm and humid in stairwell today to attempt 12 steps again today.  worked on negotiating up/down 4\" (12) and 6\" (8) steps without hand rails with use of SPC.  Seated rest half way through stair training and then again once completing. O2 sats remained >92% on 2 L of O2 during stair training   Curbs/Ramps 4\" 4\"        Neuro Re-Education:  Static standing unsupported 7 min and 55 seconds total while performing UE activities utilizing reaching overhead, across midline, and out of base of support.  SBA for safety however no loss of balance.  SpO2 92% once  completing and patient reported slight fatigue and shortness of breath.     Therapeutic Exercise:  Sci Fit L2 x20 min LE's only to increase endurance to allow patient to return to living at home safely.  Patient did not require any stops to rest and once completing SpO2 was 92%. Patient reported slight shortness of breath and LE fatigue.     ASSESSMENT:  Patient tolerated today's sessions well and is making good progress towards goals.  Patient demonstrates improved strength and endurance and is progressing towards modified independent with all mobility.  Progressed patient to using SPC and he demonstrates good safety and sequencing.  Patient still requires supervision with ambulation to manage portable O2 tank.  Patient on 2L O2 throughout session and O2 monitored frequently and it ranged from 91%-98%.  Patient has achieved 5/6 STG's with exception of negotiating 12 steps with 1 HR and supervision.  Patient will continue to focus on stairs as well as achieving all LTG's. Patient is on good track to achieve all LTG's by discharge.   Progression toward goals:  [x]      Improving appropriately and progressing toward goals  []      Improving slowly and progressing toward goals  []      Not making progress toward goals and plan of care will be adjusted     PLAN:  Patient continues to benefit from skilled intervention to address the above impairments.  Continue treatment per established plan of care.  Discharge Recommendations:  Home with assist PRN;Home with Home health PT  Further Equipment Recommendations for Discharge:    Straight Cane   Rolling    cane for stair negotiation to enter/exit home without handrails       Estimated Discharge Date:5/31/24    Activity Tolerance:   good  Please refer to the flowsheet for vital signs taken during this treatment.  After treatment:   [] Patient left in no apparent distress in bed  [x] Patient left in no apparent distress sitting up in chair  [] Patient left in no apparent

## 2024-05-28 NOTE — PROGRESS NOTES
[facility] PHYSICAL REHABILITATION  [address] [place] [id number]     INPATIENT REHABILITATION  DAILY PROGRESS NOTE     Date: 5/28/2024    Name: Julien Avila Age / Sex: 78 y.o. / male   CSN: [id number] MRN: [id number]   Admit Date: 5/20/2024 Length of Stay: 8 days     Primary Rehabilitation Diagnosis   Impaired mobility and ADLs in the setting of her right hemothorax and left 7 through 9 posterior rib fractures and T6-T10 thoracic vertebral fractures      Subjective:     I personally saw and evaluated this patient face-to-face today.  Patient is sitting in bed in no apparent distress, awake and alert.  Patient is in good spirits.  Patient is now down to 2 L oxygen via nasal cannula    Objective:     Vital Signs:  Patient Vitals for the past 24 hrs:   BP Temp Temp src Pulse Resp SpO2   05/28/24 1601 118/63 97.2 °F (36.2 °C) Oral 69 19 93 %   05/28/24 0743 123/70 97.7 °F (36.5 °C) Oral 71 19 91 %   05/27/24 2000 128/78 97.9 °F (36.6 °C) Oral 90 19 94 %        Physical Examination:  General:  Awake, alert  Cardiovascular:  S1S2+, RRR  Pulmonary:  CTA b/l  GI:  Soft, BS+, NT, ND  Extremities:  No edema        Current Medications:  Current Facility-Administered Medications   Medication Dose Route Frequency    Polyvinyl Alcohol-Povidone PF (REFRESH) 1.4-0.6 % ophthalmic solution 2 drop  2 drop Both Eyes TID PRN    glycerin (ADULT) suppository 2 g  1 suppository Rectal Daily PRN    senna (SENOKOT) tablet 17.2 mg  2 tablet Oral BID    amiodarone (CORDARONE) tablet 200 mg  200 mg Oral Daily    amLODIPine (NORVASC) tablet 5 mg  5 mg Oral Daily    DULoxetine (CYMBALTA) extended release capsule 60 mg  60 mg Oral Daily    multivitamin 1 tablet  1 tablet Oral Daily    oxyCODONE HCl (OXY-IR) immediate release tablet 10 mg  10 mg Oral Q6H PRN    pravastatin (PRAVACHOL) tablet 40 mg  40 mg Oral Daily    naloxone (NARCAN) injection 0.4 mg  0.4 mg IntraVENous PRN    rivaroxaban (XARELTO) tablet 20 mg  20 mg Oral

## 2024-05-29 LAB
ANION GAP SERPL CALC-SCNC: 6 MMOL/L (ref 3–18)
BASOPHILS # BLD: 0.1 K/UL (ref 0–0.1)
BASOPHILS NFR BLD: 1 % (ref 0–2)
BUN SERPL-MCNC: 12 MG/DL (ref 7–18)
BUN/CREAT SERPL: 14 (ref 12–20)
CALCIUM SERPL-MCNC: 9.2 MG/DL (ref 8.5–10.1)
CHLORIDE SERPL-SCNC: 103 MMOL/L (ref 100–111)
CO2 SERPL-SCNC: 27 MMOL/L (ref 21–32)
CREAT SERPL-MCNC: 0.87 MG/DL (ref 0.6–1.3)
DIFFERENTIAL METHOD BLD: ABNORMAL
EOSINOPHIL # BLD: 0.3 K/UL (ref 0–0.4)
EOSINOPHIL NFR BLD: 4 % (ref 0–5)
ERYTHROCYTE [DISTWIDTH] IN BLOOD BY AUTOMATED COUNT: 12.4 % (ref 11.6–14.5)
GLUCOSE SERPL-MCNC: 115 MG/DL (ref 74–99)
HCT VFR BLD AUTO: 38.6 % (ref 36–48)
HGB BLD-MCNC: 12.9 G/DL (ref 13–16)
IMM GRANULOCYTES # BLD AUTO: 0 K/UL (ref 0–0.04)
IMM GRANULOCYTES NFR BLD AUTO: 0 % (ref 0–0.5)
LYMPHOCYTES # BLD: 1.6 K/UL (ref 0.9–3.6)
LYMPHOCYTES NFR BLD: 19 % (ref 21–52)
MCH RBC QN AUTO: 31 PG (ref 24–34)
MCHC RBC AUTO-ENTMCNC: 33.4 G/DL (ref 31–37)
MCV RBC AUTO: 92.8 FL (ref 78–100)
MONOCYTES # BLD: 0.6 K/UL (ref 0.05–1.2)
MONOCYTES NFR BLD: 7 % (ref 3–10)
NEUTS SEG # BLD: 5.7 K/UL (ref 1.8–8)
NEUTS SEG NFR BLD: 69 % (ref 40–73)
NRBC # BLD: 0 K/UL (ref 0–0.01)
NRBC BLD-RTO: 0 PER 100 WBC
PLATELET # BLD AUTO: 472 K/UL (ref 135–420)
PMV BLD AUTO: 8.4 FL (ref 9.2–11.8)
POTASSIUM SERPL-SCNC: 3.4 MMOL/L (ref 3.5–5.5)
RBC # BLD AUTO: 4.16 M/UL (ref 4.35–5.65)
SODIUM SERPL-SCNC: 136 MMOL/L (ref 136–145)
WBC # BLD AUTO: 8.2 K/UL (ref 4.6–13.2)

## 2024-05-29 PROCEDURE — 97110 THERAPEUTIC EXERCISES: CPT

## 2024-05-29 PROCEDURE — 97530 THERAPEUTIC ACTIVITIES: CPT

## 2024-05-29 PROCEDURE — 6370000000 HC RX 637 (ALT 250 FOR IP): Performed by: EMERGENCY MEDICINE

## 2024-05-29 PROCEDURE — 97150 GROUP THERAPEUTIC PROCEDURES: CPT

## 2024-05-29 PROCEDURE — 85025 COMPLETE CBC W/AUTO DIFF WBC: CPT

## 2024-05-29 PROCEDURE — 1180000000 HC REHAB R&B

## 2024-05-29 PROCEDURE — 80048 BASIC METABOLIC PNL TOTAL CA: CPT

## 2024-05-29 PROCEDURE — 97116 GAIT TRAINING THERAPY: CPT

## 2024-05-29 PROCEDURE — 36415 COLL VENOUS BLD VENIPUNCTURE: CPT

## 2024-05-29 PROCEDURE — 99232 SBSQ HOSP IP/OBS MODERATE 35: CPT | Performed by: EMERGENCY MEDICINE

## 2024-05-29 PROCEDURE — 97112 NEUROMUSCULAR REEDUCATION: CPT

## 2024-05-29 RX ADMIN — RIVAROXABAN 20 MG: 20 TABLET, FILM COATED ORAL at 17:32

## 2024-05-29 RX ADMIN — DULOXETINE HYDROCHLORIDE 60 MG: 30 CAPSULE, DELAYED RELEASE ORAL at 09:22

## 2024-05-29 RX ADMIN — OXYCODONE HYDROCHLORIDE 10 MG: 10 TABLET ORAL at 06:52

## 2024-05-29 RX ADMIN — OXYCODONE HYDROCHLORIDE 10 MG: 10 TABLET ORAL at 15:11

## 2024-05-29 RX ADMIN — AMLODIPINE BESYLATE 5 MG: 5 TABLET ORAL at 09:22

## 2024-05-29 RX ADMIN — PRAVASTATIN SODIUM 40 MG: 20 TABLET ORAL at 09:22

## 2024-05-29 RX ADMIN — SENNOSIDES 17.2 MG: 8.6 TABLET, FILM COATED ORAL at 09:21

## 2024-05-29 RX ADMIN — ACETAMINOPHEN 650 MG: 325 TABLET ORAL at 12:46

## 2024-05-29 RX ADMIN — THERA TABS 1 TABLET: TAB at 09:22

## 2024-05-29 RX ADMIN — CHOLECALCIFEROL TAB 25 MCG (1000 UNIT) 1000 UNITS: 25 TAB at 09:21

## 2024-05-29 RX ADMIN — POTASSIUM BICARBONATE 40 MEQ: 782 TABLET, EFFERVESCENT ORAL at 09:22

## 2024-05-29 RX ADMIN — AMIODARONE HYDROCHLORIDE 200 MG: 200 TABLET ORAL at 09:22

## 2024-05-29 RX ADMIN — SENNOSIDES 17.2 MG: 8.6 TABLET, FILM COATED ORAL at 20:39

## 2024-05-29 RX ADMIN — POLYETHYLENE GLYCOL 3350 17 G: 17 POWDER, FOR SOLUTION ORAL at 17:32

## 2024-05-29 RX ADMIN — Medication 100 MG: at 09:22

## 2024-05-29 ASSESSMENT — PAIN DESCRIPTION - ONSET
ONSET: ON-GOING
ONSET: ON-GOING

## 2024-05-29 ASSESSMENT — PAIN DESCRIPTION - LOCATION
LOCATION: BACK

## 2024-05-29 ASSESSMENT — PAIN DESCRIPTION - FREQUENCY
FREQUENCY: INTERMITTENT
FREQUENCY: INTERMITTENT

## 2024-05-29 ASSESSMENT — PAIN SCALES - GENERAL
PAINLEVEL_OUTOF10: 0
PAINLEVEL_OUTOF10: 0
PAINLEVEL_OUTOF10: 4
PAINLEVEL_OUTOF10: 0
PAINLEVEL_OUTOF10: 0
PAINLEVEL_OUTOF10: 4
PAINLEVEL_OUTOF10: 5
PAINLEVEL_OUTOF10: 0

## 2024-05-29 ASSESSMENT — PAIN DESCRIPTION - PAIN TYPE
TYPE: ACUTE PAIN
TYPE: ACUTE PAIN

## 2024-05-29 ASSESSMENT — PAIN DESCRIPTION - DESCRIPTORS
DESCRIPTORS: ACHING

## 2024-05-29 ASSESSMENT — PAIN SCALES - WONG BAKER
WONGBAKER_NUMERICALRESPONSE: NO HURT

## 2024-05-29 ASSESSMENT — PAIN DESCRIPTION - ORIENTATION
ORIENTATION: MID
ORIENTATION: LEFT;UPPER
ORIENTATION: RIGHT

## 2024-05-29 ASSESSMENT — PAIN - FUNCTIONAL ASSESSMENT
PAIN_FUNCTIONAL_ASSESSMENT: ACTIVITIES ARE NOT PREVENTED

## 2024-05-29 NOTE — PROGRESS NOTES
Oxygen Walk Test:      Patient's O2 Saturation while at rest: 94%  Patient's O2 Saturation while walkin%, Supplemental O2 was placed back w/ 2L/min but saturation is not rising ,it was increased to 3L/min still not rising until it reaches to 10 L/min before it went back to  91%. Then O2 was titrated down to 2L/min when patient was resting again. Saturation was maintaining  to 94%.    Despite of the fall of O2 saturation,patient did not complain of any shortness of breath nor difficulty of breathing while walking.

## 2024-05-29 NOTE — PLAN OF CARE
Problem: Safety - Adult  Goal: Free from fall injury  5/28/2024 2114 by Tracy Shah RN  Outcome: Progressing  5/28/2024 2113 by Tracy Shah RN  Outcome: Progressing  5/28/2024 0933 by Emmanuel Winters RN  Outcome: Progressing     Problem: Pain  Goal: Verbalizes/displays adequate comfort level or baseline comfort level  Recent Flowsheet Documentation  Taken 5/28/2024 0945 by Emmanuel Winters RN  Verbalizes/displays adequate comfort level or baseline comfort level: Encourage patient to monitor pain and request assistance  5/28/2024 0933 by Emmanuel Winters RN  Outcome: Progressing

## 2024-05-29 NOTE — PLAN OF CARE
Problem: Safety - Adult  Goal: Free from fall injury  5/28/2024 2113 by Tracy Shah, RN  Outcome: Progressing  5/28/2024 0933 by Emmanuel Winters RN  Outcome: Progressing     Problem: Pain  Goal: Verbalizes/displays adequate comfort level or baseline comfort level  Recent Flowsheet Documentation  Taken 5/28/2024 0945 by Emmanuel Winters, RN  Verbalizes/displays adequate comfort level or baseline comfort level: Encourage patient to monitor pain and request assistance  5/28/2024 0933 by Emmanuel Winters, RN  Outcome: Progressing

## 2024-05-29 NOTE — PLAN OF CARE
Problem: Occupational Therapy - Adult  Goal: By Discharge: Performs self-care activities at highest level of function for planned discharge setting.  See evaluation for individualized goals.  Description: Occupational Therapy Goals   Long Term Goals  Initiated (2024) and to be accomplished within 2.5 week(s)  1. Pt will perform self-feeding with Mod I. ( 24)  2. Pt will perform grooming with set-up. ( 24)  3. Pt will perform UB bathing with set-up using AE as needed.   4. Pt will perform LB bathing with SBA using AE as needed. ( 24, upgrade to setup)  5. Pt will perform tub/shower transfer with SBA using least restrictive assistive device.   6. Pt will perform UB dressing with set-up. ( 24)  7. Pt will perform LB dressing with SBA using AE as needed. ( 24, upgrade to setup)  8. Pt will perform toileting task with SBA.   9. Pt will perform toilet transfer with SBA using least restrictive assistive device.    Short Term Goals   Initiated (2024) and to be accomplished within 7 day(s), (to be reassessed by 2024)   1. Pt will perform self-feeding with set-up/ Mod I. ( 24)  2. Pt will perform grooming with supv. ( 24)  3. Pt will perform UB bathing with CGA using AE as needed. ( 24)  4. Pt will perform LB bathing with Min A using AE as needed. ( 24)  5. Pt will perform tub/shower transfer with CGA using least restrictive assistive device. ( 24)  6. Pt will perform UB dressing with Min A.  24)  7. Pt will perform LB dressing with Min/ Mod A using AE as needed. ( 24)  8. Pt will perform toileting task with Min A.  9. Pt will perform toilet transfer with CGA/ SBA using least restrictive assistive device.     Outcome: Progressing   Occupational Therapy TREATMENT    Patient: Julien Avila   78 y.o.    Patient identified with name and : yes    Date: 2024    First Tx Session  Time In: 1131  Time Out: 1209    Second Tx  92-94%.     FUNCTIONAL MOBILITY Daily Assessment    Functional - Mobility Device: Rolling Walker  Activity: To/From therapy gym  Assist Level: Supervision  Functional Mobility Comments: Pt performed functional mobility with FWW from pt room to therapy gym and supervision and assistance to manage O2 cord and portable tank.   Stand Step Transfers: Stand by assistance  Sit to stand: Supervision  Stand to sit: Supervision  Transfer Comments: Pt performed EOB to w/c transfer with supervision/SBA and no AD and sit to stand with supervision in prep for therac and adjusting pants drawstring.      WHEELCHAIR/BED TRANSFER INDEPENDENCE Daily Assessment   Transfer Technique Stand step   Level of Assistance Supervision   Equipment Bed;Wheelchair   Comments Pt transferred EOB to w/c with no AD and SBA for increased safety.     Activity Tolerance:  Patient Tolerated treatment well         EDUCATION:   Education Given To: Patient  Education Provided: Transfer Training;Safety;Home Exercise Program;Energy Conservation  Education Method: Demonstration;Verbal  Barriers to Learning: None  Education Outcome: Verbalized understanding;Continued education needed    ASSESSMENT:  Pt is increasing independence and activity tolerance for functional transfers and carryover for self cares. Pt is increasing BUE strength as tolerated with RB's. Pt is maintaining spinal precautions with therac and functional mobility.  Progression toward goals:  [x]          Improving appropriately and progressing toward goals  []          Improving slowly and progressing toward goals  []          Not making progress toward goals and plan of care will be adjusted       PLAN:  Patient continues to benefit from skilled intervention to address the above impairments.  Continue treatment per established plan of care.  Discharge Recommendations:   Home health; home occupational therapy with 24 hr assist  Further Equipment Recommendations for Discharge:   3-in-1 commode;

## 2024-05-29 NOTE — PROGRESS NOTES
[facility] PHYSICAL REHABILITATION  [address] [place] [id number]     INPATIENT REHABILITATION  DAILY PROGRESS NOTE     Date: 5/29/2024    Name: Julien Avila Age / Sex: 78 y.o. / male   CSN: [id number] MRN: [id number]   Admit Date: 5/20/2024 Length of Stay: 9 days     Primary Rehabilitation Diagnosis   Impaired mobility and ADLs in the setting of her right hemothorax and left 7 through 9 posterior rib fractures and T6-T10 thoracic vertebral fractures      Subjective:     I personally saw and evaluated this patient face-to-face today.  Patient is sitting in bed in no apparent distress, awake and alert.  Currently on 2 L of oxygen via nasal cannula    Objective:     Vital Signs:  Patient Vitals for the past 24 hrs:   BP Temp Temp src Pulse Resp SpO2   05/29/24 0745 (!) 143/75 97.4 °F (36.3 °C) Oral 73 18 94 %   05/28/24 2030 135/76 98.7 °F (37.1 °C) Oral 69 18 94 %        Physical Examination:  General:  Awake, alert  Cardiovascular:  S1S2+, RRR  Pulmonary:  CTA b/l  GI:  Soft, BS+, NT, ND  Extremities:  No edema      Current Medications:  Current Facility-Administered Medications   Medication Dose Route Frequency    Polyvinyl Alcohol-Povidone PF (REFRESH) 1.4-0.6 % ophthalmic solution 2 drop  2 drop Both Eyes TID PRN    glycerin (ADULT) suppository 2 g  1 suppository Rectal Daily PRN    senna (SENOKOT) tablet 17.2 mg  2 tablet Oral BID    amiodarone (CORDARONE) tablet 200 mg  200 mg Oral Daily    amLODIPine (NORVASC) tablet 5 mg  5 mg Oral Daily    DULoxetine (CYMBALTA) extended release capsule 60 mg  60 mg Oral Daily    multivitamin 1 tablet  1 tablet Oral Daily    oxyCODONE HCl (OXY-IR) immediate release tablet 10 mg  10 mg Oral Q6H PRN    pravastatin (PRAVACHOL) tablet 40 mg  40 mg Oral Daily    naloxone (NARCAN) injection 0.4 mg  0.4 mg IntraVENous PRN    rivaroxaban (XARELTO) tablet 20 mg  20 mg Oral Dinner    thiamine mononitrate tablet 100 mg  100 mg Oral Daily    Vitamin D  Adaptive equipment, Supervision  LB Bathing   FLOW([id number]:last)@   Upper Body Dressing  Moderate assistance   Upper Body Dressing  UE Dressing: Setup   Lower Body Dressing  Moderate assistance Lower Body Dressing  LE Dressing: Supervision, Stand by assistance   Toileting  Moderate assistance Toileting  Toileting: Moderate assistance   Toilet Transfers  Contact guard assistance Toilet Transfers  Toilet Transfer: Contact guard assistance   Tub Transfers  Tub Transfers: Not tested  Tub Transfers Comments: Not tested during initial OT evaluation secondary to pain, impaired balance, impaired standing tolerance,  and safety.  SHOWER Transfers  Shower - Transfer From: Walker  Shower - Transfer Type: To and From  Shower - Transfer To: Transfer tub bench  Shower - Technique: Ambulating  Shower Transfers: Not tested  Shower Transfers Comments: Not tested during initial OT evaluation secondary to pain, impaired balance, impaired standing tolerance, and safety. Tub Transfers  Tub Transfers  Tub Transfers: Not tested  Tub Transfers Comments: Not tested during initial OT evaluation secondary to pain, impaired balance, impaired standing tolerance,  and safety.  SHOWER Transfers  Tub Transfers  Tub Transfers: Not tested  Tub Transfers Comments: Not tested during initial OT evaluation secondary to pain, impaired balance, impaired standing tolerance,  and safety.     Legend:   7 - Independent   6 - Modified Independent   5 - Standby Assistance / Supervision / Set-up   4 - Minimum Assistance / Contact Guard Assistance   3 - Moderate Assistance   2 - Maximum Assistance   1 - Total Assistance / Dependent             Plan:     1. Justification for continued stay: Fair progression towards established rehabilitation goals.  Patient is now able to ambulate 300 feet with a rolling walker and supervision.  In comparison patient was only able to ambulate 92 feet with a rolling walker and contact-guard assistance at the time of

## 2024-05-29 NOTE — PLAN OF CARE
Problem: Physical Therapy - Adult  Goal: By Discharge: Performs mobility at highest level of function for planned discharge setting.  See evaluation for individualized goals.  Description: Physical Therapy Short Term Goals  Initiated 5/21/2024 and to be accomplished within 7 day(s) 5/28/24- Reassessed 5/28/24  1.  Patient will perform supine to sit and sit to supine in bed with supervision/set-up.  (MET 5/28/24)  2.  Patient will transfer from bed to chair and chair to bed with supervision/set-up using the least restrictive device.(MET 5/28/24)  3.  Patient will perform sit to stand with supervision/set-up (MET 5/28/24)  4.  Patient will ambulate with supervision/set-up for 200 feet with the least restrictive device. (MET 5/28/24)  5.  Patient will ascend/descend 12 stairs with left handrail(s) with supervision/set-up. (ONGOING 5/28/24)  6. Patient will ascend/ descend 4 steps contact guard assistance without handrail and/or least restrictive device. (MET 5/28/24)    Physical Therapy Long Term Goals= Updated STG's  Initiated 5/21/2024 and to be accomplished within 14 day(s) 6/4/24  1.  Patient will perform supine to sit and sit to supine in bed with modified independence.    2.  Patient will transfer from bed to chair and chair to bed with modified independence using the least restrictive device.  3.  Patient will perform sit to stand with modified independence.  4.  Patient will ambulate with modified independence for 300 feet with the least restrictive device over even/ uneven surfaces.   5.  Patient will ascend/descend 24 stairs with left handrail(s) with modified independence/ supervision.   6. Patient will ascend/ descend 4 steps with supervision without handrail and/or least restrictive device.  Outcome: Progressing     PHYSICAL THERAPY TREATMENT    Patient: Julien Avila (78 y.o. male)  Date: 5/29/2024  Diagnosis: Hemothorax on right [J94.2]   Precautions: fall risk, lumbar precautions   Chart, physical

## 2024-05-30 PROCEDURE — 97150 GROUP THERAPEUTIC PROCEDURES: CPT

## 2024-05-30 PROCEDURE — 97535 SELF CARE MNGMENT TRAINING: CPT

## 2024-05-30 PROCEDURE — 97116 GAIT TRAINING THERAPY: CPT

## 2024-05-30 PROCEDURE — 97530 THERAPEUTIC ACTIVITIES: CPT

## 2024-05-30 PROCEDURE — 99232 SBSQ HOSP IP/OBS MODERATE 35: CPT | Performed by: EMERGENCY MEDICINE

## 2024-05-30 PROCEDURE — 6370000000 HC RX 637 (ALT 250 FOR IP): Performed by: EMERGENCY MEDICINE

## 2024-05-30 PROCEDURE — 1180000000 HC REHAB R&B

## 2024-05-30 PROCEDURE — 97110 THERAPEUTIC EXERCISES: CPT

## 2024-05-30 PROCEDURE — 97112 NEUROMUSCULAR REEDUCATION: CPT

## 2024-05-30 RX ADMIN — DULOXETINE HYDROCHLORIDE 60 MG: 30 CAPSULE, DELAYED RELEASE ORAL at 09:07

## 2024-05-30 RX ADMIN — RIVAROXABAN 20 MG: 20 TABLET, FILM COATED ORAL at 17:08

## 2024-05-30 RX ADMIN — AMIODARONE HYDROCHLORIDE 200 MG: 200 TABLET ORAL at 09:07

## 2024-05-30 RX ADMIN — AMLODIPINE BESYLATE 5 MG: 5 TABLET ORAL at 09:08

## 2024-05-30 RX ADMIN — CHOLECALCIFEROL TAB 25 MCG (1000 UNIT) 1000 UNITS: 25 TAB at 09:08

## 2024-05-30 RX ADMIN — OXYCODONE HYDROCHLORIDE 10 MG: 10 TABLET ORAL at 06:09

## 2024-05-30 RX ADMIN — OXYCODONE HYDROCHLORIDE 10 MG: 10 TABLET ORAL at 14:26

## 2024-05-30 RX ADMIN — SENNOSIDES 17.2 MG: 8.6 TABLET, FILM COATED ORAL at 20:57

## 2024-05-30 RX ADMIN — PRAVASTATIN SODIUM 40 MG: 20 TABLET ORAL at 09:07

## 2024-05-30 RX ADMIN — SENNOSIDES 17.2 MG: 8.6 TABLET, FILM COATED ORAL at 09:07

## 2024-05-30 RX ADMIN — Medication 100 MG: at 09:08

## 2024-05-30 RX ADMIN — THERA TABS 1 TABLET: TAB at 09:08

## 2024-05-30 ASSESSMENT — PAIN DESCRIPTION - ORIENTATION
ORIENTATION: LEFT
ORIENTATION: LEFT

## 2024-05-30 ASSESSMENT — PAIN DESCRIPTION - LOCATION
LOCATION: RIB CAGE
LOCATION: BACK;SHOULDER

## 2024-05-30 ASSESSMENT — PAIN SCALES - GENERAL
PAINLEVEL_OUTOF10: 0
PAINLEVEL_OUTOF10: 6
PAINLEVEL_OUTOF10: 0
PAINLEVEL_OUTOF10: 0
PAINLEVEL_OUTOF10: 3
PAINLEVEL_OUTOF10: 0
PAINLEVEL_OUTOF10: 0
PAINLEVEL_OUTOF10: 6

## 2024-05-30 ASSESSMENT — PAIN DESCRIPTION - ONSET: ONSET: ON-GOING

## 2024-05-30 ASSESSMENT — PAIN DESCRIPTION - PAIN TYPE
TYPE: ACUTE PAIN
TYPE: ACUTE PAIN

## 2024-05-30 ASSESSMENT — PAIN DESCRIPTION - FREQUENCY
FREQUENCY: INTERMITTENT
FREQUENCY: INTERMITTENT

## 2024-05-30 ASSESSMENT — PAIN DESCRIPTION - DESCRIPTORS
DESCRIPTORS: ACHING
DESCRIPTORS: SHARP

## 2024-05-30 NOTE — PLAN OF CARE
Problem: Safety - Adult  Goal: Free from fall injury  5/30/2024 0824 by Arturo Gar LPN  Outcome: Progressing  5/29/2024 2210 by Tracy Shah RN  Outcome: Progressing

## 2024-05-30 NOTE — PLAN OF CARE
Training  Education Method: Demonstration;Verbal  Barriers to Learning: None  Education Outcome: Verbalized understanding;Demonstrated understanding    ASSESSMENT:  Pt now demonstrates increased independence and safety with self cares and functional mobility with SPT while maintaining spinal precautions.   Progression toward goals:  [x]      Improving appropriately and progressing toward goals  []      Improving slowly and progressing toward goals  []      Not making progress toward goals and plan of care will be adjusted     PLAN:  Pt would benefit from continued skilled occupational therapy in order to improve ADL function and IADL with use of least restrictive device. Interventions may include range of motion (AROM, PROM B UE/trunk), motor function (B UE/trunk strengthening/coordination), activity tolerance (vitals, oxygen saturation levels), ADL, balance activities, IADL, and functional transfer training.   Rationale for discharge:  [x] Goals Achieved  [x] Plateau Reached  [] Patient not participating in therapy  [] Other:    Discharge Recommendations:   Home health; home occupational therapy with 24 hr assist  Further Equipment Recommendations for Discharge:   3-in-1 commode; tub transfer bench          Please refer to the flow sheet for vital signs taken during this treatment.  After treatment:   [x]  Patient left in no apparent distress sitting up in chair  []  Patient left in no apparent distress in bed  []  Patient handoff to SLP/PT  [x]  Call bell left within reach  []  Nursing notified  []  Caregiver present  []  Bed alarm activated    COMMUNICATION/EDUCATION:   [] Home safety education was provided and the patient/caregiver indicated understanding.  [] Patient/family have participated as able in goal setting and plan of care.  [x] Patient/family agree to work toward stated goals and plan of care.  [] Patient understands intent and goals of therapy, but is neutral about his/her participation.  [] Patient  is unable to participate in goal setting and plan of care.    IRF-OMI Completed: yes  Entered Differentiated Treatment minutes: yes    Mandy Alonzo OT  5/30/2024

## 2024-05-30 NOTE — PROGRESS NOTES
Patient received communion from [email or web address] volunteer for Julien Avila, who is a 78 y.o.,male.      The  provided the following Interventions:  Continued the relationship of care and support.   Offered prayer and assurance of continued prayer on patients behalf.   Chart reviewed.    The following outcomes were achieved:  Patient received communion from [employer].     Assessment:  There are no further spiritual or Voodoo issues which require Spiritual Care Services interventions at this time.     Plan:  Chaplains will continue to follow and will provide pastoral care on an as needed/requested basis.   recommends bedside caregivers page  on duty if patient shows signs of acute spiritual or emotional distress.     Mara Hernandez, [facility]     Spiritual Care   [phone]

## 2024-05-30 NOTE — PROGRESS NOTES
4 Eyes Skin Assessment     NAME:  Julien Avila  YOB: 1945  MEDICAL RECORD NUMBER:  [id number]    The patient is being assessed for  Shift Handoff    I agree that at least one RN has performed a thorough Head to Toe Skin Assessment on the patient. ALL assessment sites listed below have been assessed.      Areas assessed by both nurses:    Sacrum. Buttock, Coccyx, Ischium        Does the Patient have a Wound? No noted wound(s)       Booker Prevention initiated by RN: Yes  Wound Care Orders initiated by RN: No    Pressure Injury (Stage 3,4, Unstageable, DTI, NWPT, and Complex wounds) if present, place Wound referral order by RN under : No    New Ostomies, if present place, Ostomy referral order under : No     Nurse 1 eSignature: Electronically signed by FREEDOM MCCAULEY LPN on 5/30/24 at 7:33 PM EDT    **SHARE this note so that the co-signing nurse can place an eSignature**    Nurse 2 eSignature: Electronically signed by DANIEL MONREAL RN on 5/31/24 at 12:19 AM EDT

## 2024-05-30 NOTE — PROGRESS NOTES
independent  Sit to Supine: Modified independent  Bed to Chair: Modified independent  Car Transfer: Modified independent   Wheelchair Mobility  Yes       Wheelchair Mobility  Propulsion: Yes         Ambulation  Rolling Walker  Contact guard assistance  Level tile  92 feet Ambulation  Device:  (pushing O2 tank)  Assistance: Modified Independent  Surface: Level tile  Distance: 300 ft (150 ft x 2 reps consecutively without rest break)   Stairs  Yes  Bilateral  Contact guard assistance (Requires VC for sequencing) Stairs  Stairs?: Yes  Rails: Left ascending  Assistance: Supervision, Modified independent , Stand by assistance     Functional Progress:    OCCUPATIONAL THERAPY    ON ADMISSION MOST RECENT   Eating  Setup Eating  Feeding: Independent   Grooming  Stand by assistance Grooming  Grooming: Setup   Bathing  UB Bathing Minimal assistance  LB Bathing Moderate assistance, Maximum assistance Bathing  UB Bathing   UE Bathing: Setup, Adaptive equipment  LB Bathing   FLOW([id number]:last)@   Upper Body Dressing  Moderate assistance   Upper Body Dressing  UE Dressing: Setup   Lower Body Dressing  Moderate assistance Lower Body Dressing  LE Dressing: Supervision   Toileting  Moderate assistance Toileting  Toileting: Supervision   Toilet Transfers  Contact guard assistance Toilet Transfers  Toilet Transfer: Supervision   Tub Transfers  Tub Transfers: Not tested  Tub Transfers Comments: Not tested during initial OT evaluation secondary to pain, impaired balance, impaired standing tolerance,  and safety.  SHOWER Transfers  Shower - Transfer From: Walker  Shower - Transfer Type: To and From  Shower - Transfer To: Transfer tub bench  Shower - Technique: Ambulating  Shower Transfers: Not tested  Shower Transfers Comments: Not tested during initial OT evaluation secondary to pain, impaired balance, impaired standing tolerance, and safety. Tub Transfers  Tub Transfers  Tub Transfers: Not tested  Tub Transfers Comments: Not tested  Stockings     []  Sequential Compression Device     [x]  None     8. GI Prophylaxis:      []  PPI     []  H2 Blocker     [x]  None / Not indicated     9. Code status:Full     Dragon medical dictation software was used for portions of this report. Unintended errors may occur.    Personal Protective Equipment ( face mask ) was used while interacting with the patient        Signed:    Alberto Jain MD      May 30, 2024

## 2024-05-30 NOTE — PLAN OF CARE
ascend/ descend 4 steps with supervision without handrail and/or least restrictive device. (NOT MET 5/30/24- still requiring SBA for safety)     PLAN:  Pt would benefit from continued skilled physical therapy in order to improve independent functional mobility at home level. Interventions may include range of motion (AROM, PROM B LE/trunk), motor function (B LE/trunk strengthening/coordination), activity tolerance (vitals, oxygen saturation levels), bed mobility training, balance activities, gait training (progressive ambulation program), and functional transfer training.     Discharge Recommendations:  Home with assist PRN;Home with Home health PT  Further Equipment Recommendations for Discharge:    Straight Cane   Rolling   cane for stair negotiation to enter/exit home without handrails         Activity Tolerance:   good  Please refer to the flowsheet for vital signs taken during this treatment.  After treatment:   [] Patient left in no apparent distress in bed  [x] Patient left in no apparent distress sitting up in chair (hand off to OT in gym)  [] Patient left in no apparent distress in w/c mobilizing under own power  [] Patient left in no apparent distress dining area  [] Patient left in no apparent distress mobilizing under own power  [] Call bell left within reach  [] Nursing notified  [] Caregiver present  [] Bed alarm activated   [x] Chair alarm activated        IRF-OMI Completed: yes      Matilda Kemp, PTA  5/30/2024

## 2024-05-30 NOTE — PROGRESS NOTES
1400 PM Pulse oximetry on room air is 93 % after ambulating in room..   1420 PM Pulse oximetry on room air rechecked with patient resting in bed was 90 %  1608 PM Pulse oximetry on room air rechecked with patient resting in bed was 91 %

## 2024-05-30 NOTE — PROGRESS NOTES
toileting with modified independence       Barrier: decreased safety, fatigue       Intervention: adl training, o2 tubing management       ADL Accessibility Limitations: none        Physical Therapy Making gains Yes   Goal: Patient will go up and down 4 steps with no handrails at modified independence.       Barrier: decreased balance      Intervention: stair and balance training       Household Mobility Accessibility Limitations:none                                Therapy Minutes Density Met: Yes    Therapy Minutes Not met Action/Justification:   [] Pt on medical hold  [] Pt refusing therapy despite encouragement and education on benefits of therapy  [] Pt displays decreased tolerance to therapy  [] Other     CMG Date: 5/29/2024  Estimated Discharge Date: 5/31/2024  [x]Rehabilitation goals from IPOC/Treatment plan reviewed  [x]No changes identified  []Goal(s) changed:     Patient needs identified []Caregiver Education   []Family Conference         Recommended Discharge Plan []home alone   [x]home alone with assist prn    []Home with continuous supervision       []Home with continuous assistance   [] Assisted living                     []SNF   Home Health pt, ot     CURRENT EQUIPMENT NEEDS:  [x]Handicap Placard Application    Equipment needed at discharge: Straight cane    ADL Equipment:Tub bench and 3 in 1 BSC    Plan/Adjustments to Plan   [x]Medical conditions exist that require a minimum of 3 times/week physician      oversight and 24-hour rehabilitation nursing to manage/progress the plan of care   [x]Functional deficits require intensive and coordinated therapies to achieve   goals outlined in plan of care   [x]3 hours therapy 5 days/week OR 15 hours therapy over 7 days   [x]Continued plan of care as patient is showing progress and /or has an expectation to benefit    Patient's plan of care has been reviewed and/or adjusted. Continue treatment as outlined.   I have led this Team Conference and agree with the  abby, Alberto Jain MD, 5/30/2024, 1:01 PM      Team Conference Recorder Luz Mane  Date 5/30/2024    Team members participating in today's conference.   [x] Tara Valles, PT     [] Bubba Serra, PT           []    Sandra Acevedo, SLP       [x] Mandy Reddy, OT      [] Natacha Watt OT                    []  JUVE Gutiérrez  [x] Luz Mane,         [] Davin Gimenez RN             [x] RN: Coleen Serrano RN  [] Luis Carlos Campa NP                               [] Other:

## 2024-05-31 ENCOUNTER — HOME HEALTH ADMISSION (OUTPATIENT)
Age: 79
End: 2024-05-31
Payer: MEDICARE

## 2024-05-31 VITALS
DIASTOLIC BLOOD PRESSURE: 69 MMHG | BODY MASS INDEX: 36.58 KG/M2 | RESPIRATION RATE: 18 BRPM | WEIGHT: 247 LBS | OXYGEN SATURATION: 92 % | HEART RATE: 70 BPM | SYSTOLIC BLOOD PRESSURE: 129 MMHG | HEIGHT: 69 IN | TEMPERATURE: 97.7 F

## 2024-05-31 PROCEDURE — 6370000000 HC RX 637 (ALT 250 FOR IP): Performed by: EMERGENCY MEDICINE

## 2024-05-31 PROCEDURE — 97110 THERAPEUTIC EXERCISES: CPT

## 2024-05-31 PROCEDURE — 99239 HOSP IP/OBS DSCHRG MGMT >30: CPT | Performed by: NURSE PRACTITIONER

## 2024-05-31 PROCEDURE — 97530 THERAPEUTIC ACTIVITIES: CPT

## 2024-05-31 PROCEDURE — 97116 GAIT TRAINING THERAPY: CPT

## 2024-05-31 RX ORDER — NALOXONE HYDROCHLORIDE 4 MG/.1ML
1 SPRAY NASAL PRN
Qty: 1 EACH | Refills: 0 | Status: SHIPPED | OUTPATIENT
Start: 2024-05-31

## 2024-05-31 RX ORDER — THIAMINE MONONITRATE (VIT B1) 100 MG
100 TABLET ORAL DAILY
Qty: 30 TABLET | Refills: 0 | Status: SHIPPED | OUTPATIENT
Start: 2024-05-31

## 2024-05-31 RX ORDER — SENNOSIDES A AND B 8.6 MG/1
2 TABLET, FILM COATED ORAL 2 TIMES DAILY
Qty: 40 TABLET | Refills: 0 | Status: SHIPPED | OUTPATIENT
Start: 2024-05-31 | End: 2024-06-10

## 2024-05-31 RX ORDER — OXYCODONE HYDROCHLORIDE 10 MG/1
10 TABLET ORAL EVERY 6 HOURS PRN
Qty: 10 TABLET | Refills: 0 | Status: SHIPPED | OUTPATIENT
Start: 2024-05-31 | End: 2024-06-05

## 2024-05-31 RX ORDER — ALBUTEROL SULFATE 90 UG/1
2 AEROSOL, METERED RESPIRATORY (INHALATION) 4 TIMES DAILY PRN
Qty: 18 G | Refills: 0 | Status: SHIPPED | OUTPATIENT
Start: 2024-05-31

## 2024-05-31 RX ORDER — ACETAMINOPHEN 325 MG/1
650 TABLET ORAL EVERY 6 HOURS PRN
Qty: 20 TABLET | Refills: 0
Start: 2024-05-31

## 2024-05-31 RX ORDER — AMLODIPINE BESYLATE 5 MG/1
5 TABLET ORAL DAILY
Qty: 30 TABLET | Refills: 0 | Status: SHIPPED | OUTPATIENT
Start: 2024-06-01

## 2024-05-31 RX ORDER — AMIODARONE HYDROCHLORIDE 200 MG/1
200 TABLET ORAL DAILY
Qty: 30 TABLET | Refills: 0 | Status: SHIPPED | OUTPATIENT
Start: 2024-05-31

## 2024-05-31 RX ADMIN — DULOXETINE HYDROCHLORIDE 60 MG: 30 CAPSULE, DELAYED RELEASE ORAL at 08:46

## 2024-05-31 RX ADMIN — PRAVASTATIN SODIUM 40 MG: 20 TABLET ORAL at 08:41

## 2024-05-31 RX ADMIN — Medication 100 MG: at 08:41

## 2024-05-31 RX ADMIN — ACETAMINOPHEN 650 MG: 325 TABLET ORAL at 08:40

## 2024-05-31 RX ADMIN — RIVAROXABAN 20 MG: 20 TABLET, FILM COATED ORAL at 16:10

## 2024-05-31 RX ADMIN — OXYCODONE HYDROCHLORIDE 10 MG: 10 TABLET ORAL at 10:14

## 2024-05-31 RX ADMIN — SENNOSIDES 17.2 MG: 8.6 TABLET, FILM COATED ORAL at 08:41

## 2024-05-31 RX ADMIN — AMLODIPINE BESYLATE 5 MG: 5 TABLET ORAL at 08:41

## 2024-05-31 RX ADMIN — CHOLECALCIFEROL TAB 25 MCG (1000 UNIT) 1000 UNITS: 25 TAB at 08:42

## 2024-05-31 RX ADMIN — AMIODARONE HYDROCHLORIDE 200 MG: 200 TABLET ORAL at 08:41

## 2024-05-31 RX ADMIN — THERA TABS 1 TABLET: TAB at 08:41

## 2024-05-31 ASSESSMENT — PAIN DESCRIPTION - ORIENTATION
ORIENTATION: LEFT
ORIENTATION: LEFT

## 2024-05-31 ASSESSMENT — PAIN SCALES - GENERAL
PAINLEVEL_OUTOF10: 3
PAINLEVEL_OUTOF10: 0
PAINLEVEL_OUTOF10: 6
PAINLEVEL_OUTOF10: 0

## 2024-05-31 ASSESSMENT — PAIN DESCRIPTION - LOCATION
LOCATION: BACK
LOCATION: BACK

## 2024-05-31 ASSESSMENT — PAIN DESCRIPTION - FREQUENCY: FREQUENCY: INTERMITTENT

## 2024-05-31 ASSESSMENT — PAIN DESCRIPTION - ONSET: ONSET: ON-GOING

## 2024-05-31 ASSESSMENT — PAIN DESCRIPTION - DESCRIPTORS
DESCRIPTORS: ACHING
DESCRIPTORS: ACHING

## 2024-05-31 ASSESSMENT — PAIN DESCRIPTION - PAIN TYPE: TYPE: CHRONIC PAIN

## 2024-05-31 NOTE — PROGRESS NOTES
Pulse oximetry assessment   94% at rest on room air (if 88% or less, skip next steps)  85% while ambulating on room air  95% at rest on 2LPM  94% while ambulating on 2LPM

## 2024-05-31 NOTE — PROGRESS NOTES
4 Eyes Skin Assessment     NAME:  Julien Avila  YOB: 1945  MEDICAL RECORD NUMBER:  [id number]    The patient is being assessed for  Shift Handoff    I agree that at least one RN has performed a thorough Head to Toe Skin Assessment on the patient. ALL assessment sites listed below have been assessed.      Areas assessed by both nurses:    Shoulders, Back, Chest        Does the Patient have a Wound? No noted wound(s)       Booker Prevention initiated by RN: Yes  Wound Care Orders initiated by RN: No    Pressure Injury (Stage 3,4, Unstageable, DTI, NWPT, and Complex wounds) if present, place Wound referral order by RN under : No    New Ostomies, if present place, Ostomy referral order under : No     Nurse 1 eSignature: Electronically signed by DANIEL MONREAL RN on 5/31/24 at 6:50 AM EDT    **SHARE this note so that the co-signing nurse can place an eSignature**    Nurse 2 eSignature: {Esignature:[id number]}

## 2024-05-31 NOTE — DISCHARGE SUMMARY
[facility] FOR PHYSICAL REHABILITATION  [address] [place] [id number]     INPATIENT REHABILITATION  DISCHARGE SUMMARY    Name: Julien Avila MRN: [id number]   Age / Sex: 78 y.o. / male CSN: [id number]   YOB: 1945 Length of Stay: 11 days   Admit Date: 5/20/2024 Discharge Date: 05/31/2024     PRIMARY CARE PHYSICIAN: Mandy Hanson APRN - NP    DISCHARGE DIAGNOSES:    Primary Rehabilitation Diagnosis   1. Impaired Mobility and ADLs in the setting of her right hemothorax and left 7 through 9 posterior rib fractures and T6-T10 thoracic vertebral fractures.    CONSULTS CALLED: None    PROCEDURES DONE: None    BRIEF HISTORY: (from the history and physical completed by Dr. Alberto Jain)   This is a 78-year-old male with a past medical history of sleep apnea and atrial fibrillation (on Xarelto) and hypertension who was recently transferred to [facility] from [facility] for a right hemothorax.  During the hospitalization patient had a chest tube placed.  Hemothorax showed improvement.  Chest tube was removed.  Patient had a recent fall from a horse and also has left seventh through ninth posterior rib fractures and T6-T10 thoracic vertebral fractures.  Pain control was provided.  Patient was evaluated by PT and OT.  It was felt that patient may benefit from transitioning to the inpatient rehab facility.  For full details regarding hospital course please refer to chart.    The patient  was noted to have impaired mobility and ADLs. Patient was felt to be a good candidate for acute inpatient rehabilitation. Upon evaluation by Physical Therapy and Occupational Therapy, the patient was recommended for acute inpatient rehabilitation. The patient was discharged and was subsequently admitted to the [facility] for Physical Rehabilitation for intensive rehabilitation to help recover strength, function and mobility in the setting of right hemothorax and left seventh through

## 2024-05-31 NOTE — DISCHARGE INSTRUCTIONS
------------------------------------------------------------------------------------------------------------    DISCHARGE INSTRUCTIONS    1. Make sure that when you request refills at the pharmacy that the refill requests are sent to your PCP (NOT to the prescriber at the [facility]) to avoid any delays in getting your medication refills. The physician at the [facility] will not able to order medications or refills after discharge -- Please understand that though we would like to help, it is simply not safe for our physician to order you a medication that we cannot monitor.     2. If any of the prescribed medications require a PRIOR AUTHORIZATION, contact your Primary Care Physician or specialist to EITHER complete prior authorizations and paperwork on your behalf OR prescribe an alternative medication. -- Please understand that though we would like to help, it is simply not safe for our physician to order you a medication that we cannot monitor.     3. If any of your prescriptions medications PRIOR TO ADMISSION TO THE HOSPITAL needs a refill (and either the dosage, frequency or instructions was not changed), kindly contact your Primary Care Physician for refills.    -------------------------------------------------------------------------------------------------------------------

## 2024-05-31 NOTE — PLAN OF CARE
Problem: Physical Therapy - Adult  Goal: By Discharge: Performs mobility at highest level of function for planned discharge setting.  See evaluation for individualized goals.  Description: Physical Therapy Short Term Goals  Initiated 5/21/2024 and to be accomplished within 7 day(s) 5/28/24- Reassessed 5/28/24  1.  Patient will perform supine to sit and sit to supine in bed with supervision/set-up.  (MET 5/28/24)  2.  Patient will transfer from bed to chair and chair to bed with supervision/set-up using the least restrictive device.(MET 5/28/24)  3.  Patient will perform sit to stand with supervision/set-up (MET 5/28/24)  4.  Patient will ambulate with supervision/set-up for 200 feet with the least restrictive device. (MET 5/28/24)  5.  Patient will ascend/descend 12 stairs with left handrail(s) with supervision/set-up. (ONGOING 5/28/24)  6. Patient will ascend/ descend 4 steps contact guard assistance without handrail and/or least restrictive device. (MET 5/28/24)    Physical Therapy Long Term Goals= Updated STG's  Initiated 5/21/2024 and to be accomplished within 14 day(s) 6/4/24  1.  Patient will perform supine to sit and sit to supine in bed with modified independence.  (MET 5/30/24)  2.  Patient will transfer from bed to chair and chair to bed with modified independence using the least restrictive device. (MET 5/30/24)  3.  Patient will perform sit to stand with modified independence. (MET 5/30/24)  4.  Patient will ambulate with modified independence for 300 feet with the least restrictive device over even/ uneven surfaces.  (MET 5/30/24)  5.  Patient will ascend/descend 24 stairs with left handrail(s) with modified independence/ supervision. (MET 5/30/24)   6. Patient will ascend/ descend 4 steps with supervision without handrail and/or least restrictive device. (NOT MET 5/30/24- still requiring SBA for safety)  5/31/2024 0711 by Matilda Kemp, HIRO  Outcome: Adequate for Discharge    PHYSICAL THERAPY  Bed alarm activated   [] Chair alarm activated        Matilda Kemp, PTA  5/31/2024

## 2024-05-31 NOTE — PLAN OF CARE
Problem: Occupational Therapy - Adult  Goal: By Discharge: Performs self-care activities at highest level of function for planned discharge setting.  See evaluation for individualized goals.  Description: Occupational Therapy Goals   Long Term Goals  Initiated (2024) and to be accomplished within 2.5 week(s) (Reassessed on 2024 for discharge)  1. Pt will perform self-feeding with Mod I. ( 24)  2. Pt will perform grooming with set-up. ( 24)  3. Pt will perform UB bathing with set-up using AE as needed. ( 2024)  4. Pt will perform LB bathing with SBA using AE as needed. ( 24, upgrade to setup)  5. Pt will perform tub/shower transfer with SBA using least restrictive assistive device.   6. Pt will perform UB dressing with set-up. ( 24)  7. Pt will perform LB dressing with SBA using AE as needed. ( 24, upgrade to setup)  8. Pt will perform toileting task with SBA. ( 24)  9. Pt will perform toilet transfer with SBA using least restrictive assistive device. ( 24)    Short Term Goals   Initiated (2024) and to be accomplished within 7 day(s), (to be reassessed by 2024)   1. Pt will perform self-feeding with set-up/ Mod I. ( 24)  2. Pt will perform grooming with supv. ( 24)  3. Pt will perform UB bathing with CGA using AE as needed. ( 24)  4. Pt will perform LB bathing with Min A using AE as needed. ( 24)  5. Pt will perform tub/shower transfer with CGA using least restrictive assistive device. ( 24)  6. Pt will perform UB dressing with Min A.  24)  7. Pt will perform LB dressing with Min/ Mod A using AE as needed. ( 24)  8. Pt will perform toileting task with Min A. ( 24)  9. Pt will perform toilet transfer with CGA/ SBA using least restrictive assistive device. ( 24)  Occupational Therapy TREATMENT    Patient: Julien Avila   78 y.o.    Patient identified with name and : yes    Date:  verbal cues for positioning of brace and for managing velcro straps.      FUNCTIONAL MOBILITY Daily Assessment    Wheelchair mobility performed (Mod I) within pt's room environment; Mod I for managing brakes. Propels with BUE/ LE's.      WHEELCHAIR TRANSFER INDEPENDENCE Daily Assessment   Transfer Technique Stand step xfer   Level of Assistance Modified independent   Equipment SPC; FWW     Activity Tolerance:  Patient Tolerated treatment well ; intermittent rest breaks due to fatigue.   Pt edu/ instructed on use of energy conservation strategies e.g. pacing, positioning, and pursed lip breathing. SpO2 spot checked   EDUCATION:   Education Given To: Patient  Education Provided: Fall Prevention Strategies;Safety;Transfer Training  Education Method: Demonstration;Verbal  Barriers to Learning: None  Education Outcome: Verbalized understanding;Demonstrated understanding    ASSESSMENT:  Pt demonstrates increased independence with functional transfers/ mobility using AD while maintaining spinal precautions. Patient participated in UE there ex for BUE strengthening and increased activity tolerance for carryover with self care performance. Reviewed UE HEP for carryover to home environment. Per GABRIELA Man; pt will have home oxygen set-up. SpO2 spot checked during upper body there ex; SpO2 dropped to 88% on exertion and room air. Supplemental oxygen reapplied at 2LPM. SpO2 spot checked at end of therapy session ranging from 94 to 97% with O2 in use; staff nurse made aware. Patient seated wheelchair level with needs met; call bell in reach.  Progression toward goals:  [x]          Improving appropriately and progressing toward goals  []          Improving slowly and progressing toward goals  []          Not making progress toward goals and plan of care will be adjusted      PLAN:  Pt would benefit from continued skilled occupational therapy in order to improve ADL function and IADL with use of least restrictive device.

## 2024-05-31 NOTE — PROGRESS NOTES
Julien Avila 78 y.o. male was discharged home on 05/31/24. Patient's armband removed and shredded. Discharge instructions were reviewed with patient, and he verbalized understanding. The patient was wheeled out to transportation vehicle by a staff member along with the patient's belongings. Transportation was provided by private vehicle.    JUAN RAMON ANGEL RN

## 2024-05-31 NOTE — PROGRESS NOTES
Sw placed order for a rolling walker and home o2 set up with [employer] with pt's consent.     Pt previously FOC to [facility] for home care but sw id'd that they only offer outpatient PT. Sw reoffered FOC and pt selects [facility]. Sw placed referral and notified liaison.     No further needs are noted at this time.

## 2024-05-31 NOTE — PROGRESS NOTES
Prior to discharge, nurse practitioner discussed and went through current and discharge medications in detail with the patient at the bedside. The NP discussed which drugs to discontinue, resume, and continue after discharge. NP explained and answered questions about follow-up care/pcp/specialist appointments, as well as discharge process expectations. The patient expressed his understanding verbally. Patient will be discharged with home health, skilled nursing, PT/OT. NP discussed discharge process with discharge RN.     Luis Carlos Campa, SOFIA - CNP

## 2024-05-31 NOTE — PROGRESS NOTES
TEAM CONFERENCE FOLLOW-UP  Patient: Julien Avila (78 y.o. male)  Date: 5/31/2024  Diagnosis: Hemothorax on right [J94.2] Hemothorax on right      Precautions:      Met with patient to discuss the findings from 5/31/2024 Team Conference.    Patient requested further information and/or had questions:   Vilma Mane

## 2024-05-31 NOTE — HOME CARE
Received home health referral for [facility] for the following disciplines  (SN / PT / OT).  Discharge order for today.    Spoke with patient in room;  patient identifiers verified.  Explained home health care services and routines.  Demographics verified including insurance, phone and address confirmed.  Answered all questions to the best of this writer's scope of practice and provided [facility] contact card.  Patient has the following DME: rolling walker and portable oxygen has been ordered.  Caregivers available:  has family and friends available to assist patient.   Orders noted and arranged and sent to central intake and scheduling.        ---   SHANTI Gamble [facility] Home Care Liaison

## 2024-06-01 NOTE — PROGRESS NOTES
Patient received communion from [employer] volunteer for Julien Avila, who is a 78 y.o.,male.      The  provided the following Interventions:  Continued the relationship of care and support.   Offered prayer and assurance of continued prayer on patients behalf.   Chart reviewed.    The following outcomes were achieved:  Patient received communion from Birdi.     Assessment:  There are no further spiritual or Mormonism issues which require Spiritual Care Services interventions at this time.     Plan:  Chaplains will continue to follow and will provide pastoral care on an as needed/requested basis.   recommends bedside caregivers page  on duty if patient shows signs of acute spiritual or emotional distress.     Mara Hernandez, [facility]     Spiritual Care   [phone]

## 2024-06-03 ENCOUNTER — HOME CARE VISIT (OUTPATIENT)
Age: 79
End: 2024-06-03

## 2024-06-04 ENCOUNTER — HOME CARE VISIT (OUTPATIENT)
Age: 79
End: 2024-06-04

## 2024-06-10 ENCOUNTER — HOME CARE VISIT (OUTPATIENT)
Age: 79
End: 2024-06-10

## 2024-06-10 VITALS
DIASTOLIC BLOOD PRESSURE: 73 MMHG | HEART RATE: 78 BPM | RESPIRATION RATE: 16 BRPM | SYSTOLIC BLOOD PRESSURE: 127 MMHG | OXYGEN SATURATION: 97 % | TEMPERATURE: 97.7 F

## 2024-06-10 PROCEDURE — G0299 HHS/HOSPICE OF RN EA 15 MIN: HCPCS

## 2024-06-10 PROCEDURE — 0221000100 HH NO PAY CLAIM PROCEDURE

## 2024-06-10 ASSESSMENT — ENCOUNTER SYMPTOMS
DYSPNEA ACTIVITY LEVEL: AFTER AMBULATING 10 - 20 FT
PAIN LOCATION - PAIN QUALITY: CONSTANT

## 2024-06-11 ENCOUNTER — HOME CARE VISIT (OUTPATIENT)
Age: 79
End: 2024-06-11

## 2024-06-11 VITALS
SYSTOLIC BLOOD PRESSURE: 103 MMHG | OXYGEN SATURATION: 97 % | RESPIRATION RATE: 18 BRPM | HEART RATE: 78 BPM | TEMPERATURE: 97.8 F | DIASTOLIC BLOOD PRESSURE: 60 MMHG

## 2024-06-11 PROCEDURE — G0151 HHCP-SERV OF PT,EA 15 MIN: HCPCS

## 2024-06-12 ASSESSMENT — ENCOUNTER SYMPTOMS: PAIN LOCATION - PAIN QUALITY: ACHE

## 2024-06-12 NOTE — CASE COMMUNICATION
Julien Avila is a 78 y.o. male referred with a dx of hemothorax s/p fall from hose with 7th and 9th posterior rib fractures and T6-10 thoracic fractures 5 weeks ago. PMH: SOB, anxiety, HLD, OA, LVH, IBS, DVT, chest pain, skin cancer, acid reflux, A-fib and HTN. Patient states he also has been diagnosed with DISH diffuse idiopathic skeletal hyperostosis. Patient reports he has had 2 bought's of A-fib today and over the weekend that have l eft him feeling very fatigued. Plan: 2W3, 1W1.

## 2024-06-12 NOTE — HOME HEALTH
follows: pursed lipped breathing and ankle pumps x 10 reps every hour.  Patient's response to treatment: patient reporting dizziness when first standing and fatigue from 2 boughts of A-fib.   Patient's response to education provided: patient expressed understanding to go to ER if A-fib s/s persist  Continued need for the following skills: MD referral for HHPT following recent hospital stay. HHPT is medically necessary to address  dx and clinical findings: decreased strength, impaired gait, decreased ability w stair negotiation, decreased transfer status, decreased endurance, decreased balance and decreased safety, increased pain in order to improve functional mobility/quality of life, decrease burden of care, reduce risk for re-hospitalization, work towards patient's personal goals of return to PLOF w decrease risk for falls.

## 2024-06-13 ENCOUNTER — HOME CARE VISIT (OUTPATIENT)
Age: 79
End: 2024-06-13

## 2024-06-13 VITALS
SYSTOLIC BLOOD PRESSURE: 145 MMHG | OXYGEN SATURATION: 95 % | RESPIRATION RATE: 16 BRPM | TEMPERATURE: 97.4 F | HEART RATE: 73 BPM | DIASTOLIC BLOOD PRESSURE: 87 MMHG

## 2024-06-13 PROCEDURE — G0299 HHS/HOSPICE OF RN EA 15 MIN: HCPCS

## 2024-06-13 ASSESSMENT — ENCOUNTER SYMPTOMS: PAIN LOCATION - PAIN QUALITY: INTERMITTENT

## 2024-06-13 NOTE — HOME HEALTH
Skilled reason for visit: Patient was recently hospitalized for Hemothorax , requiring observation by a SN for s/s of decomposition or adverse effects resulting from newly prescribed medications.  Skilled observation needed to determine if new medication regimen prescribed requires modifications or other therapeutic interventions considered until pt's clinical condition or treatment has stabilized.  Caregiver involvement:  Patient's caregiver is his spouse. Caregiver assists patient with bathing, dressing, walking, bathroom, meal prep and setup, medication management, grocery shopping, household chores, transportation to MD appointment and home exercise program.  Medications reconciled and all medications are available in the home this visit.  The following education was provided regarding medications, medication interactions, and look alike modifications.  amiodarone (CORDARONE) 200 MG tablet         Contact Agency or MD with questions.  Medications are effective at this time.  Patient states understanding.    Patient education provided this visit:  .X.... Disease Management: HTN teaching, pain management.  See interventions for further teaching  Patient level of understanding of education provided: Pt verbalized understanding of all education and repeated back teaching   Skilled Care Performed this visit: Disease process teaching, medication teaching, physical assessment and monitoring  Patient response to procedure performed:  Pt verbalized satisfaction with blood draw attempts  Sharps Education Provided: NA  Goals/teaching progressing. Patient's goal is to have reduced pain. Progressing toward goals. Patient has remained free from falls, free from infection; no safety concerns at this time and is ambulating independently with walker.  SN to complete education of patient and patient to follow up with any further questions or concerns with NP Valentin   Home exercise program:  Fall risk prevention  Continued need

## 2024-06-14 ENCOUNTER — HOSPITAL ENCOUNTER (EMERGENCY)
Facility: HOSPITAL | Age: 79
Discharge: HOME | End: 2024-06-14
Attending: EMERGENCY MEDICINE
Payer: MEDICARE

## 2024-06-14 ENCOUNTER — HOME CARE VISIT (OUTPATIENT)
Age: 79
End: 2024-06-14
Payer: MEDICARE

## 2024-06-14 ENCOUNTER — APPOINTMENT (OUTPATIENT)
Facility: HOSPITAL | Age: 79
End: 2024-06-14
Payer: MEDICARE

## 2024-06-14 VITALS
BODY MASS INDEX: 34.07 KG/M2 | DIASTOLIC BLOOD PRESSURE: 78 MMHG | SYSTOLIC BLOOD PRESSURE: 119 MMHG | OXYGEN SATURATION: 95 % | TEMPERATURE: 98.3 F | HEIGHT: 69 IN | RESPIRATION RATE: 20 BRPM | WEIGHT: 230 LBS | HEART RATE: 90 BPM

## 2024-06-14 DIAGNOSIS — I48.91 ATRIAL FIBRILLATION WITH RVR (HCC): Primary | ICD-10-CM

## 2024-06-14 LAB
ALBUMIN SERPL-MCNC: 3.6 G/DL (ref 3.4–5)
ALBUMIN/GLOB SERPL: 1.2 (ref 0.8–1.7)
ALP SERPL-CCNC: 117 U/L (ref 45–117)
ALT SERPL-CCNC: 18 U/L (ref 16–61)
ANION GAP SERPL CALC-SCNC: 7 MMOL/L (ref 3–18)
AST SERPL-CCNC: 18 U/L (ref 10–38)
BASOPHILS # BLD: 0.1 K/UL (ref 0–0.1)
BASOPHILS NFR BLD: 1 % (ref 0–2)
BILIRUB SERPL-MCNC: 0.5 MG/DL (ref 0.2–1)
BUN SERPL-MCNC: 10 MG/DL (ref 7–18)
BUN/CREAT SERPL: 10 (ref 12–20)
CALCIUM SERPL-MCNC: 9.1 MG/DL (ref 8.5–10.1)
CHLORIDE SERPL-SCNC: 103 MMOL/L (ref 100–111)
CO2 SERPL-SCNC: 28 MMOL/L (ref 21–32)
CREAT SERPL-MCNC: 0.96 MG/DL (ref 0.6–1.3)
DIFFERENTIAL METHOD BLD: ABNORMAL
EOSINOPHIL # BLD: 0.2 K/UL (ref 0–0.4)
EOSINOPHIL NFR BLD: 2 % (ref 0–5)
ERYTHROCYTE [DISTWIDTH] IN BLOOD BY AUTOMATED COUNT: 13.3 % (ref 11.6–14.5)
GLOBULIN SER CALC-MCNC: 3.1 G/DL (ref 2–4)
GLUCOSE SERPL-MCNC: 114 MG/DL (ref 74–99)
HCT VFR BLD AUTO: 42.9 % (ref 36–48)
HGB BLD-MCNC: 14.6 G/DL (ref 13–16)
IMM GRANULOCYTES # BLD AUTO: 0 K/UL (ref 0–0.04)
IMM GRANULOCYTES NFR BLD AUTO: 0 % (ref 0–0.5)
LYMPHOCYTES # BLD: 1.6 K/UL (ref 0.9–3.6)
LYMPHOCYTES NFR BLD: 22 % (ref 21–52)
MAGNESIUM SERPL-MCNC: 2.2 MG/DL (ref 1.6–2.6)
MCH RBC QN AUTO: 30.7 PG (ref 24–34)
MCHC RBC AUTO-ENTMCNC: 34 G/DL (ref 31–37)
MCV RBC AUTO: 90.1 FL (ref 78–100)
MONOCYTES # BLD: 0.5 K/UL (ref 0.05–1.2)
MONOCYTES NFR BLD: 7 % (ref 3–10)
NEUTS SEG # BLD: 5.2 K/UL (ref 1.8–8)
NEUTS SEG NFR BLD: 68 % (ref 40–73)
NRBC # BLD: 0 K/UL (ref 0–0.01)
NRBC BLD-RTO: 0 PER 100 WBC
PLATELET # BLD AUTO: 292 K/UL (ref 135–420)
PMV BLD AUTO: 8.8 FL (ref 9.2–11.8)
POTASSIUM SERPL-SCNC: 3.6 MMOL/L (ref 3.5–5.5)
PROT SERPL-MCNC: 6.7 G/DL (ref 6.4–8.2)
RBC # BLD AUTO: 4.76 M/UL (ref 4.35–5.65)
SODIUM SERPL-SCNC: 138 MMOL/L (ref 136–145)
TROPONIN I SERPL HS-MCNC: 12 NG/L (ref 0–78)
WBC # BLD AUTO: 7.6 K/UL (ref 4.6–13.2)

## 2024-06-14 PROCEDURE — 84484 ASSAY OF TROPONIN QUANT: CPT

## 2024-06-14 PROCEDURE — 99285 EMERGENCY DEPT VISIT HI MDM: CPT

## 2024-06-14 PROCEDURE — 6370000000 HC RX 637 (ALT 250 FOR IP): Performed by: EMERGENCY MEDICINE

## 2024-06-14 PROCEDURE — 85025 COMPLETE CBC W/AUTO DIFF WBC: CPT

## 2024-06-14 PROCEDURE — 80053 COMPREHEN METABOLIC PANEL: CPT

## 2024-06-14 PROCEDURE — 71045 X-RAY EXAM CHEST 1 VIEW: CPT

## 2024-06-14 PROCEDURE — 93005 ELECTROCARDIOGRAM TRACING: CPT | Performed by: EMERGENCY MEDICINE

## 2024-06-14 PROCEDURE — 83735 ASSAY OF MAGNESIUM: CPT

## 2024-06-14 RX ORDER — DILTIAZEM HYDROCHLORIDE 120 MG/1
120 CAPSULE, COATED, EXTENDED RELEASE ORAL DAILY
Qty: 30 CAPSULE | Refills: 0 | Status: SHIPPED | OUTPATIENT
Start: 2024-06-14 | End: 2024-07-14

## 2024-06-14 RX ORDER — DILTIAZEM HYDROCHLORIDE 120 MG/1
120 CAPSULE, COATED, EXTENDED RELEASE ORAL
Status: COMPLETED | OUTPATIENT
Start: 2024-06-14 | End: 2024-06-14

## 2024-06-14 RX ORDER — METOPROLOL TARTRATE 1 MG/ML
5 INJECTION, SOLUTION INTRAVENOUS
Status: DISCONTINUED | OUTPATIENT
Start: 2024-06-14 | End: 2024-06-14 | Stop reason: HOSPADM

## 2024-06-14 RX ADMIN — DILTIAZEM HYDROCHLORIDE 120 MG: 120 CAPSULE, EXTENDED RELEASE ORAL at 12:51

## 2024-06-14 NOTE — ED PROVIDER NOTES
EMERGENCY DEPARTMENT HISTORY AND PHYSICAL EXAM    Date: 6/14/2024  Patient Name: Julien Avila    History of Presenting Illness     Chief Complaint   Patient presents with    Atrial Fibrillation     Pt stated that he has been in a fib since 8 this morning . Pt stated that he feels weak and off balance. Pt stated that this has happened in the past         History Provided By: Patient    Additional History (Context):   9:16 AM EDT  Julien Avila is a 78 y.o. male with PMHX of atrial fibrillation with long-term use anticoagulation, hypertension, reflux, hyperlipidemia, osteoarthritis, previous DVTs, IBS, electrolyte abnormality, skin cancer who presents to the emergency department C/O atrial fibrillation.  Patient with history of paroxysmal atrial fibrillation states she went into A-fib earlier this morning.  He had already been up going about his day when he began having symptoms of palpitations and heart racing.  He took his pulse found to be irregular knew he was in A-fib again.  He denies any vomiting diarrhea.  He denies any fevers chills cough productive sputum or other signs of illness.  He has been compliant with his medication regimen including his amiodarone.  When symptoms started he took an extra dose of this medication.  He is also compliant with his Xarelto.  He states Dr. Diaz his cardiologist.  He and his wife have been calling the office have not been able to get an appointment.    Social History  Patient is a smoker quit almost 20 years ago.  No significant alcohol or drug use.    Family History  Father with hypertension.    PCP: Mandy Hanson APRN - NP    No current facility-administered medications for this encounter.     Current Outpatient Medications   Medication Sig Dispense Refill    dilTIAZem (CARDIZEM CD) 120 MG extended release capsule Take 1 capsule by mouth daily 30 capsule 0    potassium chloride (KLOR-CON) 20 MEQ packet Take 20 mEq by mouth daily.      indapamide (LOZOL) 2.5 MG tablet  tablet by mouth daily     therapeutic multivitamin-minerals tablet  Take 1 tablet by mouth daily     vitamin B-1 100 MG tablet  Commonly known as: THIAMINE  Take 1 tablet by mouth daily     vitamin D 25 MCG (1000 UT) Caps            3. [unfilled]  _______________________________    This note was partially transcribed via voice recognition software. Although efforts have been made to catch any discrepancies, it may contain sound alike words, grammatical errors, or nonsensical words.

## 2024-06-16 LAB
EKG DIAGNOSIS: NORMAL
EKG Q-T INTERVAL: 398 MS
EKG QRS DURATION: 94 MS
EKG QTC CALCULATION (BAZETT): 531 MS
EKG R AXIS: 3 DEGREES
EKG T AXIS: 14 DEGREES
EKG VENTRICULAR RATE: 107 BPM

## 2024-06-17 ENCOUNTER — HOME CARE VISIT (OUTPATIENT)
Age: 79
End: 2024-06-17
Payer: MEDICARE

## 2024-06-17 PROCEDURE — G0157 HHC PT ASSISTANT EA 15: HCPCS

## 2024-06-19 ENCOUNTER — HOME CARE VISIT (OUTPATIENT)
Age: 79
End: 2024-06-19
Payer: MEDICARE

## 2024-06-19 VITALS
OXYGEN SATURATION: 96 % | HEART RATE: 71 BPM | SYSTOLIC BLOOD PRESSURE: 131 MMHG | TEMPERATURE: 98 F | RESPIRATION RATE: 18 BRPM | DIASTOLIC BLOOD PRESSURE: 72 MMHG

## 2024-06-19 PROCEDURE — G0300 HHS/HOSPICE OF LPN EA 15 MIN: HCPCS

## 2024-06-19 PROCEDURE — G0157 HHC PT ASSISTANT EA 15: HCPCS

## 2024-06-19 ASSESSMENT — ENCOUNTER SYMPTOMS: STOOL DESCRIPTION: SOFT FORMED

## 2024-06-24 VITALS
HEART RATE: 64 BPM | OXYGEN SATURATION: 98 % | TEMPERATURE: 98 F | RESPIRATION RATE: 18 BRPM | TEMPERATURE: 97.8 F | OXYGEN SATURATION: 93 % | SYSTOLIC BLOOD PRESSURE: 110 MMHG | RESPIRATION RATE: 16 BRPM | DIASTOLIC BLOOD PRESSURE: 60 MMHG | DIASTOLIC BLOOD PRESSURE: 60 MMHG | HEART RATE: 74 BPM | SYSTOLIC BLOOD PRESSURE: 114 MMHG

## 2024-06-24 NOTE — HOME HEALTH
SUBJECTIVE: Patient reports he went to the ER due to A-Fib on 6/14 and will need to have an appt with Cardiology.  CAREGIVER INVOLVEMENT/ASSISTANCE NEEDED FOR: Patient's wife is able to offer any assist as needed  HOME HEALTH SUPPLIES BY TYPE AND QUANTITY ORDERED/DELIVERED THIS VISIT INCLUDE: None  OBJECTIVE:  See interventions.  PATIENT RESPONSE TO TREATMENT:  Patient has a good response to overall treatment with no reported increase in pain and able to follow cues for good quality with strengthening exercises and deep breathing.  PATIENT LEVEL OF UNDERSTANDING OF EDUCATION PROVIDED: Patient instructed with pain mgmt, energy conservationa nd fall risk prevention  ASSESSMENT OF PROGRESS TOWARD GOALS: Patient instructed with HEP-cues for slow controlled movements,  Patient able to utilize his hand on his abdomen to help cue for proper technique with deep breathing exercises after instruction by return demonstration.  CONTINUED NEED FOR THE FOLLOWING SKILLS: Patient will be seen 2 x week for Physical Therapy to continue to work toward goals within POC and HHPT is medically necessary to address  dx and clinical findings: decreased ROM, decreased strength, impaired gait, decreased ability w stair negotiation, increased swelling, decreased bed mobility, decreased transfer status, decreased endurance, decreased balance and decreased safety, increased pain in order to improve functional mobility/quality of life, decrease burden of care, reduce risk for re-hospitalization, work towards patient's personal goals of return to PLOF w decrease risk for falls.  PLAN FOR NEXT VISIT: Strengthening exercises, balance retraining  THE FOLLOWING DISCHARGE PLANNING WAS DISCUSSED WITH THE PATIENT/CAREGIVER: This is visit number  2 out of  7  planned visits.  NEXT MD APPT:ALIREZA

## 2024-06-24 NOTE — CASE COMMUNICATION
Please add Diliazem  24H ER(CD) 120 MG CP Take one capsule by mouth daily    Patient reports he got a message on Friday to increase his potassium but it was when he went to the ER. Patient reports he will call Dr Salazar to get it straight.    He is currently on Potassium CL ER 20 MEQ tablets to take Two tablets by mouth everyday with food.    Patient has O2 and was weaning off  and now no longer uses it

## 2024-06-24 NOTE — HOME HEALTH
SUBJECTIVE: Patient reports he is trying to follow HEP  CAREGIVER INVOLVEMENT/ASSISTANCE NEEDED FOR: Patient has siginificant other to offer any assist as needed  HOME HEALTH SUPPLIES BY TYPE AND QUANTITY ORDERED/DELIVERED THIS VISIT INCLUDE: None  OBJECTIVE:  See interventions.  PATIENT RESPONSE TO TREATMENT:  Good response to treatment with no report of increased pain  PATIENT LEVEL OF UNDERSTANDING OF EDUCATION PROVIDED: Patient instructed with energy conservation, fall risk prevention andpain mgmt  ASSESSMENT OF PROGRESS TOWARD GOALS: Instructed patient with balance activities working towards goal of increased balance-including box step, one leg stance while other leg motion is forward, to the side, and back, and forward and backward walking-good tolerance with activies-O2 SAT remained in high 90's.  Cues for upright posturing to improve overall balance and he utilizes step over step with front steps with no LOB-Sup.  Instructed to utilize deep breathing when SOB and he is able to utilize good technique.  No fall reported.  Prgoressing towards goals  CONTINUED NEED FOR THE FOLLOWING SKILLS: Patient will be seen 2 x week for Physical Therapy to continue to work toward goals within POC and HHPT is medically necessary to address  dx and clinical findings: decreased ROM, decreased strength, impaired gait, decreased ability w stair negotiation, increased swelling, decreased bed mobility, decreased transfer status, decreased endurance, decreased balance and decreased safety, increased pain in order to improve functional mobility/quality of life, decrease burden of care, reduce risk for re-hospitalization, work towards patient's personal goals of return to PLOF w decrease risk for falls.  PLAN FOR NEXT VISIT: Strengthening exercises and balance retraining woeking towards goals  THE FOLLOWING DISCHARGE PLANNING WAS DISCUSSED WITH THE PATIENT/CAREGIVER: This is visit number 3  out of  7  planned visits.  NEXT MD

## 2024-06-25 ENCOUNTER — HOME CARE VISIT (OUTPATIENT)
Age: 79
End: 2024-06-25
Payer: MEDICARE

## 2024-06-25 VITALS
TEMPERATURE: 97.2 F | HEART RATE: 67 BPM | DIASTOLIC BLOOD PRESSURE: 88 MMHG | OXYGEN SATURATION: 95 % | SYSTOLIC BLOOD PRESSURE: 145 MMHG | RESPIRATION RATE: 6 BRPM

## 2024-06-25 PROCEDURE — G0299 HHS/HOSPICE OF RN EA 15 MIN: HCPCS

## 2024-06-25 ASSESSMENT — ENCOUNTER SYMPTOMS: PAIN LOCATION - PAIN QUALITY: INTERMITTENT

## 2024-06-25 NOTE — HOME HEALTH
Skilled reason for visit: Patient was recently hospitalized for Hemothorax, requiring observation by a SN for s/s of decomposition or adverse effects resulting from newly prescribed medications.  Skilled observation needed to determine if new medication regimen prescribed requires modifications or other therapeutic interventions considered until pt's clinical condition or treatment has stabilized.  Caregiver involvement: Patient lives with spouse . Caregiver assists patient with meal prep and setup, medication management, grocery shopping, household chores, transportation to MD appointment and home exercise program.  Medications reconciled and all medications are available in the home this visit.  The following education was provided regarding medications, medication interactions, and look alike medications:  Report medication questions or problems to Home Health Agency or MD.  Medications are effective currently.  Patient states understanding.    Patient education provided this visit:  Reconciled all medications with patient on discharge, all questions addressed and answered.   Sharps education performed: NA  Patient level of understanding of education provided: Pt verbalized understanding of all education and repeated back teaching  Skilled Care Performed this visit: Disease process teaching, medication teaching, physical assessment and monitoring  Patient response to procedure performed:  No procedure performed, SN teaching visit  Progress toward goals: Goals/teaching completed.  Patient to follow up with any questions or concerns with PCP  Home exercise program: PT  Continued need for the following skills: PT  Patient and/or caregiver notified and agrees to changes in the Plan of Care YES  The following discharge planning was discussed with the pt/caregiver: SN discharge with teaching and goals met.

## 2024-06-26 ENCOUNTER — HOME CARE VISIT (OUTPATIENT)
Age: 79
End: 2024-06-26
Payer: MEDICARE

## 2024-06-26 PROCEDURE — G0157 HHC PT ASSISTANT EA 15: HCPCS

## 2024-06-28 ENCOUNTER — HOME CARE VISIT (OUTPATIENT)
Age: 79
End: 2024-06-28
Payer: MEDICARE

## 2024-06-29 ENCOUNTER — APPOINTMENT (OUTPATIENT)
Facility: HOSPITAL | Age: 79
End: 2024-06-29
Payer: MEDICARE

## 2024-06-29 ENCOUNTER — HOSPITAL ENCOUNTER (EMERGENCY)
Facility: HOSPITAL | Age: 79
Discharge: HOME | End: 2024-06-30
Attending: STUDENT IN AN ORGANIZED HEALTH CARE EDUCATION/TRAINING PROGRAM
Payer: MEDICARE

## 2024-06-29 VITALS
DIASTOLIC BLOOD PRESSURE: 73 MMHG | TEMPERATURE: 97.8 F | WEIGHT: 230 LBS | OXYGEN SATURATION: 95 % | RESPIRATION RATE: 24 BRPM | HEIGHT: 69 IN | SYSTOLIC BLOOD PRESSURE: 136 MMHG | BODY MASS INDEX: 34.07 KG/M2 | HEART RATE: 78 BPM

## 2024-06-29 DIAGNOSIS — I48.0 PAROXYSMAL ATRIAL FIBRILLATION (HCC): ICD-10-CM

## 2024-06-29 DIAGNOSIS — R00.2 PALPITATIONS: Primary | ICD-10-CM

## 2024-06-29 LAB
ALBUMIN SERPL-MCNC: 3.4 G/DL (ref 3.4–5)
ALBUMIN/GLOB SERPL: 1 (ref 0.8–1.7)
ALP SERPL-CCNC: 94 U/L (ref 45–117)
ALT SERPL-CCNC: 25 U/L (ref 16–61)
ANION GAP SERPL CALC-SCNC: 11 MMOL/L (ref 3–18)
AST SERPL-CCNC: 46 U/L (ref 10–38)
BASOPHILS # BLD: 0.1 K/UL (ref 0–0.1)
BASOPHILS NFR BLD: 1 % (ref 0–2)
BILIRUB SERPL-MCNC: 0.4 MG/DL (ref 0.2–1)
BUN SERPL-MCNC: 23 MG/DL (ref 7–18)
BUN/CREAT SERPL: 19 (ref 12–20)
CALCIUM SERPL-MCNC: 8.8 MG/DL (ref 8.5–10.1)
CHLORIDE SERPL-SCNC: 103 MMOL/L (ref 100–111)
CO2 SERPL-SCNC: 21 MMOL/L (ref 21–32)
CREAT SERPL-MCNC: 1.23 MG/DL (ref 0.6–1.3)
DIFFERENTIAL METHOD BLD: NORMAL
EKG ATRIAL RATE: 80 BPM
EKG DIAGNOSIS: NORMAL
EKG P AXIS: 43 DEGREES
EKG P-R INTERVAL: 196 MS
EKG Q-T INTERVAL: 404 MS
EKG QRS DURATION: 92 MS
EKG QTC CALCULATION (BAZETT): 465 MS
EKG R AXIS: 43 DEGREES
EKG T AXIS: 111 DEGREES
EKG VENTRICULAR RATE: 80 BPM
EOSINOPHIL # BLD: 0.2 K/UL (ref 0–0.4)
EOSINOPHIL NFR BLD: 3 % (ref 0–5)
ERYTHROCYTE [DISTWIDTH] IN BLOOD BY AUTOMATED COUNT: 14.1 % (ref 11.6–14.5)
GLOBULIN SER CALC-MCNC: 3.5 G/DL (ref 2–4)
GLUCOSE SERPL-MCNC: 144 MG/DL (ref 74–99)
HCT VFR BLD AUTO: 42.2 % (ref 36–48)
HGB BLD-MCNC: 14.4 G/DL (ref 13–16)
IMM GRANULOCYTES # BLD AUTO: 0 K/UL (ref 0–0.04)
IMM GRANULOCYTES NFR BLD AUTO: 0 % (ref 0–0.5)
LYMPHOCYTES # BLD: 2.6 K/UL (ref 0.9–3.6)
LYMPHOCYTES NFR BLD: 30 % (ref 21–52)
MAGNESIUM SERPL-MCNC: 2.1 MG/DL (ref 1.6–2.6)
MCH RBC QN AUTO: 31.2 PG (ref 24–34)
MCHC RBC AUTO-ENTMCNC: 34.1 G/DL (ref 31–37)
MCV RBC AUTO: 91.3 FL (ref 78–100)
MONOCYTES # BLD: 0.6 K/UL (ref 0.05–1.2)
MONOCYTES NFR BLD: 7 % (ref 3–10)
NEUTS SEG # BLD: 5.1 K/UL (ref 1.8–8)
NEUTS SEG NFR BLD: 60 % (ref 40–73)
NRBC # BLD: 0 K/UL (ref 0–0.01)
NRBC BLD-RTO: 0 PER 100 WBC
NT PRO BNP: 85 PG/ML (ref 0–1800)
PLATELET # BLD AUTO: 267 K/UL (ref 135–420)
PMV BLD AUTO: 9.3 FL (ref 9.2–11.8)
POTASSIUM SERPL-SCNC: 4.1 MMOL/L (ref 3.5–5.5)
PROT SERPL-MCNC: 6.9 G/DL (ref 6.4–8.2)
RBC # BLD AUTO: 4.62 M/UL (ref 4.35–5.65)
SODIUM SERPL-SCNC: 135 MMOL/L (ref 136–145)
TROPONIN I SERPL HS-MCNC: 16 NG/L (ref 0–78)
WBC # BLD AUTO: 8.6 K/UL (ref 4.6–13.2)

## 2024-06-29 PROCEDURE — 85025 COMPLETE CBC W/AUTO DIFF WBC: CPT

## 2024-06-29 PROCEDURE — 93005 ELECTROCARDIOGRAM TRACING: CPT | Performed by: STUDENT IN AN ORGANIZED HEALTH CARE EDUCATION/TRAINING PROGRAM

## 2024-06-29 PROCEDURE — 80053 COMPREHEN METABOLIC PANEL: CPT

## 2024-06-29 PROCEDURE — 83880 ASSAY OF NATRIURETIC PEPTIDE: CPT

## 2024-06-29 PROCEDURE — 84439 ASSAY OF FREE THYROXINE: CPT

## 2024-06-29 PROCEDURE — 84443 ASSAY THYROID STIM HORMONE: CPT

## 2024-06-29 PROCEDURE — 71045 X-RAY EXAM CHEST 1 VIEW: CPT

## 2024-06-29 PROCEDURE — 84484 ASSAY OF TROPONIN QUANT: CPT

## 2024-06-29 PROCEDURE — 99285 EMERGENCY DEPT VISIT HI MDM: CPT

## 2024-06-29 PROCEDURE — 83735 ASSAY OF MAGNESIUM: CPT

## 2024-06-29 ASSESSMENT — PAIN - FUNCTIONAL ASSESSMENT: PAIN_FUNCTIONAL_ASSESSMENT: 0-10

## 2024-06-30 LAB
T4 FREE SERPL-MCNC: 1.1 NG/DL (ref 0.7–1.5)
TSH SERPL DL<=0.05 MIU/L-ACNC: 4.05 UIU/ML (ref 0.36–3.74)

## 2024-06-30 NOTE — DISCHARGE INSTRUCTIONS
You were evaluated for palpations and irregular heart beats .  Based on your work-up it was deemed that she was stable for discharge.   Please follow-up with your primary care physician if you have any further concerns and go over your work-up.  If you experience any chest pain, shortness of breath, worsening abdominal pain, vomiting blood, worsening headache, seizures, or any worsening of your symptoms please return to the emergency department immediately.  If you have any pending results or any further questions please contact the emergency department at [phone]

## 2024-07-01 LAB
EKG ATRIAL RATE: 80 BPM
EKG DIAGNOSIS: NORMAL
EKG P AXIS: 43 DEGREES
EKG P-R INTERVAL: 196 MS
EKG Q-T INTERVAL: 404 MS
EKG QRS DURATION: 92 MS
EKG QTC CALCULATION (BAZETT): 465 MS
EKG R AXIS: 43 DEGREES
EKG T AXIS: 111 DEGREES
EKG VENTRICULAR RATE: 80 BPM

## 2024-07-02 ENCOUNTER — HOME CARE VISIT (OUTPATIENT)
Age: 79
End: 2024-07-02
Payer: MEDICARE

## 2024-07-02 VITALS
DIASTOLIC BLOOD PRESSURE: 62 MMHG | TEMPERATURE: 97.6 F | HEART RATE: 66 BPM | SYSTOLIC BLOOD PRESSURE: 140 MMHG | OXYGEN SATURATION: 97 % | RESPIRATION RATE: 16 BRPM

## 2024-07-02 PROCEDURE — G0151 HHCP-SERV OF PT,EA 15 MIN: HCPCS

## 2024-07-02 ASSESSMENT — ENCOUNTER SYMPTOMS: PAIN LOCATION - PAIN QUALITY: SHARP

## 2024-07-02 NOTE — CASE COMMUNICATION
Patient's Tinetti score increased from 17 /28 to 28/28-placing patient from a high fall risk category to a low fall risk category.  Tug score improved from 15 sec to 11.55 sec.  FSTST score was 16.2 sec-goal was less than 15 sec-not met.  Patient is able to follow HEP and no longer needs caregiver to amb with him or for transfers: gait now independent with good posturing to improve overall balance and is able enter/exit home with Indep endence with stairs. He is able to demonstrate deep breathing with good quality and no cues needed.  Patient is in agreement with upcoming discharge next week.   He would like to continue outpatient therapy.

## 2024-07-02 NOTE — HOME HEALTH
SUBJECTIVE: Patient reports he had a follow up appt with his thoracic MD and patient reports his \"ribs have not healed yet\".  He reports he is turning in his O2 equipment -\"I haven't needed it.\"    CAREGIVER INVOLVEMENT/ASSISTANCE NEEDED FOR: Patient has siginificant other to offer any assist as needed    HOME HEALTH SUPPLIES BY TYPE AND QUANTITY ORDERED/DELIVERED THIS VISIT INCLUDE: None    OBJECTIVE:  See interventions.    PATIENT RESPONSE TO TREATMENT:  Good response to treatment with no report of increased pain and O2 SATs have remained in high 90's throughout treatment.    PATIENT LEVEL OF UNDERSTANDING OF EDUCATION PROVIDED: Patient able to teach back educational goals    ASSESSMENT OF PROGRESS TOWARD GOALS: Patient's Tinetti score increased from 17 /28 to 28/28-placing patient from a high fall risk category to a low fall risk category.  Tug score improved from 15 sec to 11.55 sec.  FSTST score was 16.2 sec-goal was less than 15 sec-not met.  Patient is able to follow HEP and no longer needs caregiver to amb with him or for transfers: gait now independent with good posturing to improve overall balance and is able enter/exit home with [place] with stairs. He is able to demonstrate deep breathing with good quality and no cues needed.  Patient is in agreement with upcoming discharge next week.   He would like to continue outpatient therapy.    CONTINUED NEED FOR THE FOLLOWING SKILLS: Patient will be seen 2 x week for Physical Therapy to continue to work toward goals within POC and HHPT is medically necessary to address  dx and clinical findings: decreased ROM, decreased strength, impaired gait, decreased ability w stair negotiation, increased swelling, decreased bed mobility, decreased transfer status, decreased endurance, decreased balance and decreased safety, increased pain in order to improve functional mobility/quality of life, decrease burden of care, reduce risk for re-hospitalization, work towards

## 2024-07-03 VITALS
OXYGEN SATURATION: 96 % | RESPIRATION RATE: 18 BRPM | SYSTOLIC BLOOD PRESSURE: 135 MMHG | DIASTOLIC BLOOD PRESSURE: 75 MMHG | HEART RATE: 53 BPM | TEMPERATURE: 97.9 F

## 2024-07-03 ASSESSMENT — ENCOUNTER SYMPTOMS: PAIN LOCATION - PAIN QUALITY: ACHE

## 2024-07-03 NOTE — HOME HEALTH
S: Patient stated he saw his cardiologist last week and he added one new medication and took him off of another. He states he has not felt any new side effects of the medication and he hopes it helps keep him out of A-fib.  MED: message left with PCP for bradycardia and medication change with major interaction     O: PAIN: see pain tab   WOUND: wound clean and healing R flank, photo uploaded. Paitent instructed to have PCP remove remaining stitch during visit next week.   ROM: patient has spinal precautions and is to wear back brace at all times although he reports he does not always wear it.   STRENGTH:Patient demonstrates decreased muscle strength as follows:  R hip flex 5/5  R hip abd 5/5  R hip add 5/5  R knee flex 5/5  R knee ext 5/5  R ankle DF 5/5  L hip flex 5/5  L hip abd 5/5  L hip add 5/5  L knee flex 5/5  L knee ext 5/5  L ankle DF 5/5  FTSTS 16 seconds   BED MOBILITY: independent   EQUIPMENT: SPC, back brace, oxygen  TRANSFERS:MI  GAIT: >300 ft with SPC and good reciprocal gait pattern, patient's O2 sats WNL without oxygen   STEPS: MI to second floor master with railing   BALANCE: Tinetti 25/28 and TUG 14 seconds - patient has been free from falls   A:ASSESSMENT AND PROGRESS TOWARD GOALS: Julien Avila received skilled PT and RN s/p hemothorax. This patient has made good progress towards goals and has been discharged to a HEP and at this time with a recommendation to transition to [facility] or outpatient PT. The following is patients functional status at discharge: TUG 14 seconds, FTSTS 16 seconds, Tinetti 25/28 and patient is ambulating >300 ft without an AD indoors and a SPC outdoors. He is ambulating stairs to second floor master and has been instructed in a daily HEP. Patient verbalized understanding of all discharge instructions and is in agreement with discharge this visit.     P: DC    Discharge medication list reconciled with patient and caregiver. Questions regarding medications answered and

## 2024-07-04 NOTE — CASE COMMUNICATION
Julien Austin received skilled PT and RN s/p hemothorax. This patient has made good progress towards goals and has been discharged to a HEP and at this time with a recommendation to transition to [facility] or outpatient PT. The following is patients functional status at discharge: TUG 14 seconds, FTSTS 16 seconds, Tinetti 25/28 and patient is ambulating >300 ft without an AD indoors and a SPC outdoors. He is ambulating stairs to [facility] and has been instructed in a daily HEP. Patient verbalized understanding of all discharge instructions and is in agreement with discharge this visit.

## 2024-07-09 NOTE — ED PROVIDER NOTES
EMERGENCY DEPARTMENT HISTORY AND PHYSICAL EXAM    12:46 PM      Date: 6/29/2024  Patient Name: Julien Avila    History of Presenting Illness     Chief Complaint   Patient presents with    Irregular Heart Beat       History From: Patient    Julien Avila is a 78 y.o. male   HPI    78-year-old male with history of diverticulosis, A-fib on diltiazem, Xarelto who presents with palpitations.  Patient says that he developed acute onset of palpitations today.  Denies change in meds, sick contacts, or any other changes.  No aggravating or alleviating factors.  When assessing ROS he denies any chest pain, shortness of breath, change in bowel movement, nausea, vomiting, rashes, or any other changes.       Nursing Notes were all reviewed and agreed with or any disagreements were addressed in the HPI.    PCP: Mandy Hanson APRN - NP    No current facility-administered medications for this encounter.     Current Outpatient Medications   Medication Sig Dispense Refill    metoprolol tartrate (LOPRESSOR) 25 MG tablet Take 25 mg by mouth 2 times daily.      dilTIAZem (CARDIZEM) 120 MG tablet Take 120 mg by mouth daily.      dilTIAZem (CARDIZEM CD) 120 MG extended release capsule Take 1 capsule by mouth daily 30 capsule 0    potassium chloride (KLOR-CON) 20 MEQ packet Take 20 mEq by mouth daily.      indapamide (LOZOL) 2.5 MG tablet Take 2.5 mg by mouth daily.      oxyCODONE HCl (OXY-IR) 10 MG immediate release tablet Take 10 mg by mouth every 6 hours as needed for Pain.      Oxygen Concentrator 2 L/min by Nasal route continuous prn (SOB).      acetaminophen (TYLENOL) 325 MG tablet Take 2 tablets by mouth every 6 hours as needed for Pain or Fever 20 tablet 0    amiodarone (CORDARONE) 200 MG tablet Take 1 tablet by mouth daily 30 tablet 0    albuterol sulfate HFA (VENTOLIN HFA) 108 (90 Base) MCG/ACT inhaler Inhale 2 puffs into the lungs 4 times daily as needed for Wheezing or Shortness of Breath 18 g 0    naloxone (NARCAN) 4

## 2024-08-21 ENCOUNTER — APPOINTMENT (OUTPATIENT)
Facility: HOSPITAL | Age: 79
End: 2024-08-21
Payer: MEDICARE

## 2024-08-21 ENCOUNTER — HOSPITAL ENCOUNTER (EMERGENCY)
Facility: HOSPITAL | Age: 79
Discharge: HOME | End: 2024-08-21
Attending: EMERGENCY MEDICINE
Payer: MEDICARE

## 2024-08-21 VITALS
RESPIRATION RATE: 23 BRPM | HEART RATE: 64 BPM | OXYGEN SATURATION: 98 % | TEMPERATURE: 97.8 F | DIASTOLIC BLOOD PRESSURE: 86 MMHG | WEIGHT: 230 LBS | HEIGHT: 69 IN | SYSTOLIC BLOOD PRESSURE: 139 MMHG | BODY MASS INDEX: 34.07 KG/M2

## 2024-08-21 DIAGNOSIS — I48.0 PAROXYSMAL ATRIAL FIBRILLATION (HCC): Primary | ICD-10-CM

## 2024-08-21 LAB
ALBUMIN SERPL-MCNC: 3.8 G/DL (ref 3.4–5)
ALBUMIN/GLOB SERPL: 1.1 (ref 0.8–1.7)
ALP SERPL-CCNC: 82 U/L (ref 45–117)
ALT SERPL-CCNC: 27 U/L (ref 16–61)
ANION GAP SERPL CALC-SCNC: 5 MMOL/L (ref 3–18)
AST SERPL-CCNC: 12 U/L (ref 10–38)
BASOPHILS # BLD: 0.1 K/UL (ref 0–0.1)
BASOPHILS NFR BLD: 1 % (ref 0–2)
BILIRUB SERPL-MCNC: 0.5 MG/DL (ref 0.2–1)
BUN SERPL-MCNC: 18 MG/DL (ref 7–18)
BUN/CREAT SERPL: 16 (ref 12–20)
CALCIUM SERPL-MCNC: 9.5 MG/DL (ref 8.5–10.1)
CHLORIDE SERPL-SCNC: 102 MMOL/L (ref 100–111)
CO2 SERPL-SCNC: 31 MMOL/L (ref 21–32)
CREAT SERPL-MCNC: 1.12 MG/DL (ref 0.6–1.3)
DIFFERENTIAL METHOD BLD: ABNORMAL
EKG ATRIAL RATE: 64 BPM
EKG DIAGNOSIS: NORMAL
EKG DIAGNOSIS: NORMAL
EKG P AXIS: 45 DEGREES
EKG P-R INTERVAL: 212 MS
EKG Q-T INTERVAL: 406 MS
EKG Q-T INTERVAL: 440 MS
EKG QRS DURATION: 88 MS
EKG QRS DURATION: 90 MS
EKG QTC CALCULATION (BAZETT): 453 MS
EKG QTC CALCULATION (BAZETT): 488 MS
EKG R AXIS: -7 DEGREES
EKG R AXIS: 55 DEGREES
EKG T AXIS: 58 DEGREES
EKG T AXIS: 62 DEGREES
EKG VENTRICULAR RATE: 64 BPM
EKG VENTRICULAR RATE: 87 BPM
EOSINOPHIL # BLD: 0.1 K/UL (ref 0–0.4)
EOSINOPHIL NFR BLD: 1 % (ref 0–5)
ERYTHROCYTE [DISTWIDTH] IN BLOOD BY AUTOMATED COUNT: 13.2 % (ref 11.6–14.5)
GLOBULIN SER CALC-MCNC: 3.5 G/DL (ref 2–4)
GLUCOSE SERPL-MCNC: 117 MG/DL (ref 74–99)
HCT VFR BLD AUTO: 47.7 % (ref 36–48)
HGB BLD-MCNC: 16.2 G/DL (ref 13–16)
IMM GRANULOCYTES # BLD AUTO: 0 K/UL (ref 0–0.04)
IMM GRANULOCYTES NFR BLD AUTO: 0 % (ref 0–0.5)
LYMPHOCYTES # BLD: 2.3 K/UL (ref 0.9–3.6)
LYMPHOCYTES NFR BLD: 28 % (ref 21–52)
MAGNESIUM SERPL-MCNC: 2.4 MG/DL (ref 1.6–2.6)
MCH RBC QN AUTO: 30.7 PG (ref 24–34)
MCHC RBC AUTO-ENTMCNC: 34 G/DL (ref 31–37)
MCV RBC AUTO: 90.5 FL (ref 78–100)
MONOCYTES # BLD: 0.6 K/UL (ref 0.05–1.2)
MONOCYTES NFR BLD: 8 % (ref 3–10)
NEUTS SEG # BLD: 5.2 K/UL (ref 1.8–8)
NEUTS SEG NFR BLD: 63 % (ref 40–73)
NRBC # BLD: 0 K/UL (ref 0–0.01)
NRBC BLD-RTO: 0 PER 100 WBC
NT PRO BNP: 69 PG/ML (ref 0–1800)
PLATELET # BLD AUTO: 249 K/UL (ref 135–420)
PMV BLD AUTO: 8.6 FL (ref 9.2–11.8)
POTASSIUM SERPL-SCNC: 3.3 MMOL/L (ref 3.5–5.5)
PROT SERPL-MCNC: 7.3 G/DL (ref 6.4–8.2)
RBC # BLD AUTO: 5.27 M/UL (ref 4.35–5.65)
SODIUM SERPL-SCNC: 138 MMOL/L (ref 136–145)
TROPONIN I SERPL HS-MCNC: 8 NG/L (ref 0–78)
TROPONIN I SERPL HS-MCNC: 9 NG/L (ref 0–78)
WBC # BLD AUTO: 8.4 K/UL (ref 4.6–13.2)

## 2024-08-21 PROCEDURE — 84484 ASSAY OF TROPONIN QUANT: CPT

## 2024-08-21 PROCEDURE — 83735 ASSAY OF MAGNESIUM: CPT

## 2024-08-21 PROCEDURE — 93005 ELECTROCARDIOGRAM TRACING: CPT | Performed by: EMERGENCY MEDICINE

## 2024-08-21 PROCEDURE — 71045 X-RAY EXAM CHEST 1 VIEW: CPT

## 2024-08-21 PROCEDURE — 80053 COMPREHEN METABOLIC PANEL: CPT

## 2024-08-21 PROCEDURE — 85025 COMPLETE CBC W/AUTO DIFF WBC: CPT

## 2024-08-21 PROCEDURE — 6370000000 HC RX 637 (ALT 250 FOR IP): Performed by: EMERGENCY MEDICINE

## 2024-08-21 PROCEDURE — 2580000003 HC RX 258: Performed by: EMERGENCY MEDICINE

## 2024-08-21 PROCEDURE — 83880 ASSAY OF NATRIURETIC PEPTIDE: CPT

## 2024-08-21 PROCEDURE — 99285 EMERGENCY DEPT VISIT HI MDM: CPT

## 2024-08-21 PROCEDURE — 93010 ELECTROCARDIOGRAM REPORT: CPT | Performed by: INTERNAL MEDICINE

## 2024-08-21 RX ORDER — 0.9 % SODIUM CHLORIDE 0.9 %
500 INTRAVENOUS SOLUTION INTRAVENOUS ONCE
Status: COMPLETED | OUTPATIENT
Start: 2024-08-21 | End: 2024-08-21

## 2024-08-21 RX ORDER — POTASSIUM CHLORIDE 20 MEQ/1
40 TABLET, EXTENDED RELEASE ORAL
Status: COMPLETED | OUTPATIENT
Start: 2024-08-21 | End: 2024-08-21

## 2024-08-21 RX ORDER — POTASSIUM CHLORIDE 20 MEQ/1
20 TABLET, EXTENDED RELEASE ORAL
Status: DISCONTINUED | OUTPATIENT
Start: 2024-08-21 | End: 2024-08-21

## 2024-08-21 RX ADMIN — POTASSIUM CHLORIDE 40 MEQ: 1500 TABLET, EXTENDED RELEASE ORAL at 10:22

## 2024-08-21 RX ADMIN — SODIUM CHLORIDE 500 ML: 9 INJECTION, SOLUTION INTRAVENOUS at 09:54

## 2024-08-21 NOTE — ED PROVIDER NOTES
every 6 hours as needed for Pain.      Oxygen Concentrator 2 L/min by Nasal route continuous prn (SOB).      acetaminophen (TYLENOL) 325 MG tablet Take 2 tablets by mouth every 6 hours as needed for Pain or Fever 20 tablet 0    amiodarone (CORDARONE) 200 MG tablet Take 1 tablet by mouth daily 30 tablet 0    albuterol sulfate HFA (VENTOLIN HFA) 108 (90 Base) MCG/ACT inhaler Inhale 2 puffs into the lungs 4 times daily as needed for Wheezing or Shortness of Breath 18 g 0    naloxone (NARCAN) 4 MG/0.1ML LIQD nasal spray 1 spray by Nasal route as needed for Opioid Reversal 1 each 0    vitamin B-1 (THIAMINE) 100 MG tablet Take 1 tablet by mouth daily 30 tablet 0    rivaroxaban (XARELTO) 20 MG TABS tablet Take 1 tablet by mouth daily 30 tablet 1    Multiple Vitamins-Minerals (THERAPEUTIC MULTIVITAMIN-MINERALS) tablet Take 1 tablet by mouth daily 10 tablet 0    vitamin D 25 MCG (1000 UT) CAPS Take 1 capsule by mouth daily      DULoxetine (CYMBALTA) 60 MG extended release capsule Take 90 mg by mouth daily      pravastatin (PRAVACHOL) 40 MG tablet Take 1 tablet by mouth daily         Past History     Past Medical History:  Past Medical History:   Diagnosis Date    Acid reflux     Adverse effect of anesthesia     hangover from anes    Arthritis     Atrial fibrillation (HCC)     Paroxysmal    Cancer (HCC)     skin    Chest pain 2/9/2011    DVT of lower extremity (deep venous thrombosis) (HCC)     Right, after horse stepped on me    Hyperlipidemia     Hypertension     Hypopotassemia 6/6/2013    IBS (irritable bowel syndrome)     constipation    Long term (current) use of anticoagulants     LVH (left ventricular hypertrophy)     Near syncope 3/30/2014    Osteoarthritis of right hip 2/15/2021    Other and unspecified hyperlipidemia 1/28/2014    Psychiatric disorder     anxiety    Sleep apnea     advised to bring CPAP day of surgery    SOB (shortness of breath) 2/10/2011       Past Surgical History:  Past Surgical History:  [id number]  [phone]    As needed, If symptoms worsen      DISCHARGE MEDICATIONS:  Discharge Medication List as of 8/21/2024 12:20 PM          Attestations:    Jamie Sandhu DO    Please note that this dictation was completed with [employer], the computer voice recognition software.  Quite often unanticipated grammatical, syntax, homophones, and other interpretive errors are inadvertently transcribed by the computer software.  Please disregard these errors.  Please excuse any errors that have escaped final proofreading.  Thank you.         Jamie Sandhu DO  08/21/24 3555

## 2024-08-21 NOTE — ED NOTES
Pt resting quietly on phone on stretcher. Sts he feels \"tired\" which is how he usually feels after his heart rate decreases.

## 2024-08-21 NOTE — DISCHARGE INSTRUCTIONS
Call your cardiologist to follow-up.  Call the office today, and let them know you were seen in the ER for A-fib and discuss if they want to change her medicines or see you in person.        Thank you for choosing our Emergency Department for your care.  It is our privilege to care for you in your time of need.  In the next several days, you may receive a survey via email or mailed to your home about your experience with our team.  We would greatly appreciate you taking a few minutes to complete the survey, as we use this information to learn what we have done well and what we could be doing better. Thank you for trusting us with your care!    Below you will find a list of your tests from today's visit.   Labs  Recent Results (from the past 12 hour(s))   CBC with Auto Differential    Collection Time: 08/21/24  9:06 AM   Result Value Ref Range    WBC 8.4 4.6 - 13.2 K/uL    RBC 5.27 4.35 - 5.65 M/uL    Hemoglobin 16.2 (H) 13.0 - 16.0 g/dL    Hematocrit 47.7 36.0 - 48.0 %    MCV 90.5 78.0 - 100.0 FL    MCH 30.7 24.0 - 34.0 PG    MCHC 34.0 31.0 - 37.0 g/dL    RDW 13.2 11.6 - 14.5 %    Platelets 249 135 - 420 K/uL    MPV 8.6 (L) 9.2 - 11.8 FL    Nucleated RBCs 0.0 0  WBC    nRBC 0.00 0.00 - 0.01 K/uL    Neutrophils % 63 40 - 73 %    Lymphocytes % 28 21 - 52 %    Monocytes % 8 3 - 10 %    Eosinophils % 1 0 - 5 %    Basophils % 1 0 - 2 %    Immature Granulocytes % 0 0.0 - 0.5 %    Neutrophils Absolute 5.2 1.8 - 8.0 K/UL    Lymphocytes Absolute 2.3 0.9 - 3.6 K/UL    Monocytes Absolute 0.6 0.05 - 1.2 K/UL    Eosinophils Absolute 0.1 0.0 - 0.4 K/UL    Basophils Absolute 0.1 0.0 - 0.1 K/UL    Immature Granulocytes Absolute 0.0 0.00 - 0.04 K/UL    Differential Type AUTOMATED     Comprehensive Metabolic Panel w/ Reflex to MG    Collection Time: 08/21/24  9:06 AM   Result Value Ref Range    Sodium 138 136 - 145 mmol/L    Potassium 3.3 (L) 3.5 - 5.5 mmol/L    Chloride 102 100 - 111 mmol/L    CO2 31 21 - 32 mmol/L    Anion

## 2024-09-30 ENCOUNTER — APPOINTMENT (OUTPATIENT)
Facility: HOSPITAL | Age: 79
DRG: 853 | End: 2024-09-30
Payer: MEDICARE

## 2024-09-30 ENCOUNTER — HOSPITAL ENCOUNTER (INPATIENT)
Facility: HOSPITAL | Age: 79
LOS: 2 days | Discharge: HOME | DRG: 853 | End: 2024-10-02
Attending: STUDENT IN AN ORGANIZED HEALTH CARE EDUCATION/TRAINING PROGRAM | Admitting: INTERNAL MEDICINE
Payer: MEDICARE

## 2024-09-30 ENCOUNTER — ANESTHESIA EVENT (OUTPATIENT)
Facility: HOSPITAL | Age: 79
DRG: 853 | End: 2024-09-30
Payer: MEDICARE

## 2024-09-30 ENCOUNTER — ANESTHESIA (OUTPATIENT)
Facility: HOSPITAL | Age: 79
DRG: 853 | End: 2024-09-30
Payer: MEDICARE

## 2024-09-30 DIAGNOSIS — D72.829 LEUKOCYTOSIS, UNSPECIFIED TYPE: ICD-10-CM

## 2024-09-30 DIAGNOSIS — K35.80 ACUTE APPENDICITIS, UNSPECIFIED ACUTE APPENDICITIS TYPE: Primary | ICD-10-CM

## 2024-09-30 DIAGNOSIS — R65.10 SIRS (SYSTEMIC INFLAMMATORY RESPONSE SYNDROME) (HCC): ICD-10-CM

## 2024-09-30 PROBLEM — A41.9 SEPSIS (HCC): Status: ACTIVE | Noted: 2024-09-30

## 2024-09-30 LAB
ALBUMIN SERPL-MCNC: 3.6 G/DL (ref 3.4–5)
ALBUMIN/GLOB SERPL: 1.1 (ref 0.8–1.7)
ALP SERPL-CCNC: 79 U/L (ref 45–117)
ALT SERPL-CCNC: 20 U/L (ref 16–61)
ANION GAP SERPL CALC-SCNC: 7 MMOL/L (ref 3–18)
AST SERPL-CCNC: 17 U/L (ref 10–38)
BASOPHILS # BLD: 0.1 K/UL (ref 0–0.1)
BASOPHILS NFR BLD: 1 % (ref 0–2)
BILIRUB SERPL-MCNC: 1.3 MG/DL (ref 0.2–1)
BUN SERPL-MCNC: 18 MG/DL (ref 7–18)
BUN/CREAT SERPL: 16 (ref 12–20)
CALCIUM SERPL-MCNC: 9.5 MG/DL (ref 8.5–10.1)
CHLORIDE SERPL-SCNC: 97 MMOL/L (ref 100–111)
CO2 SERPL-SCNC: 30 MMOL/L (ref 21–32)
CREAT SERPL-MCNC: 1.15 MG/DL (ref 0.6–1.3)
DIFFERENTIAL METHOD BLD: ABNORMAL
EOSINOPHIL # BLD: 0.1 K/UL (ref 0–0.4)
EOSINOPHIL NFR BLD: 0 % (ref 0–5)
ERYTHROCYTE [DISTWIDTH] IN BLOOD BY AUTOMATED COUNT: 13 % (ref 11.6–14.5)
GLOBULIN SER CALC-MCNC: 3.2 G/DL (ref 2–4)
GLUCOSE SERPL-MCNC: 130 MG/DL (ref 74–99)
HCT VFR BLD AUTO: 44.8 % (ref 36–48)
HGB BLD-MCNC: 15.6 G/DL (ref 13–16)
IMM GRANULOCYTES # BLD AUTO: 0.1 K/UL (ref 0–0.04)
IMM GRANULOCYTES NFR BLD AUTO: 1 % (ref 0–0.5)
LACTATE BLD-SCNC: 1.19 MMOL/L (ref 0.4–2)
LIPASE SERPL-CCNC: 48 U/L (ref 13–75)
LYMPHOCYTES # BLD: 1.3 K/UL (ref 0.9–3.6)
LYMPHOCYTES NFR BLD: 8 % (ref 21–52)
MCH RBC QN AUTO: 31.5 PG (ref 24–34)
MCHC RBC AUTO-ENTMCNC: 34.8 G/DL (ref 31–37)
MCV RBC AUTO: 90.5 FL (ref 78–100)
MONOCYTES # BLD: 1 K/UL (ref 0.05–1.2)
MONOCYTES NFR BLD: 7 % (ref 3–10)
NEUTS SEG # BLD: 12.9 K/UL (ref 1.8–8)
NEUTS SEG NFR BLD: 84 % (ref 40–73)
NRBC # BLD: 0 K/UL (ref 0–0.01)
NRBC BLD-RTO: 0 PER 100 WBC
PLATELET # BLD AUTO: 243 K/UL (ref 135–420)
PMV BLD AUTO: 8.7 FL (ref 9.2–11.8)
POTASSIUM SERPL-SCNC: 3.7 MMOL/L (ref 3.5–5.5)
PROT SERPL-MCNC: 6.8 G/DL (ref 6.4–8.2)
RBC # BLD AUTO: 4.95 M/UL (ref 4.35–5.65)
SODIUM SERPL-SCNC: 134 MMOL/L (ref 136–145)
WBC # BLD AUTO: 15.3 K/UL (ref 4.6–13.2)

## 2024-09-30 PROCEDURE — 3700000000 HC ANESTHESIA ATTENDED CARE: Performed by: SURGERY

## 2024-09-30 PROCEDURE — 85025 COMPLETE CBC W/AUTO DIFF WBC: CPT

## 2024-09-30 PROCEDURE — 93005 ELECTROCARDIOGRAM TRACING: CPT | Performed by: STUDENT IN AN ORGANIZED HEALTH CARE EDUCATION/TRAINING PROGRAM

## 2024-09-30 PROCEDURE — 94660 CPAP INITIATION&MGMT: CPT

## 2024-09-30 PROCEDURE — 88304 TISSUE EXAM BY PATHOLOGIST: CPT

## 2024-09-30 PROCEDURE — 6360000002 HC RX W HCPCS: Performed by: SURGERY

## 2024-09-30 PROCEDURE — 2500000003 HC RX 250 WO HCPCS

## 2024-09-30 PROCEDURE — 3600000002 HC SURGERY LEVEL 2 BASE: Performed by: SURGERY

## 2024-09-30 PROCEDURE — 2720000010 HC SURG SUPPLY STERILE: Performed by: SURGERY

## 2024-09-30 PROCEDURE — 2580000003 HC RX 258: Performed by: INTERNAL MEDICINE

## 2024-09-30 PROCEDURE — 6360000002 HC RX W HCPCS: Performed by: INTERNAL MEDICINE

## 2024-09-30 PROCEDURE — 96374 THER/PROPH/DIAG INJ IV PUSH: CPT

## 2024-09-30 PROCEDURE — 7100000001 HC PACU RECOVERY - ADDTL 15 MIN: Performed by: SURGERY

## 2024-09-30 PROCEDURE — 80053 COMPREHEN METABOLIC PANEL: CPT

## 2024-09-30 PROCEDURE — 3600000012 HC SURGERY LEVEL 2 ADDTL 15MIN: Performed by: SURGERY

## 2024-09-30 PROCEDURE — 87040 BLOOD CULTURE FOR BACTERIA: CPT

## 2024-09-30 PROCEDURE — 1100000003 HC PRIVATE W/ TELEMETRY

## 2024-09-30 PROCEDURE — 2580000003 HC RX 258: Performed by: SURGERY

## 2024-09-30 PROCEDURE — 2709999900 HC NON-CHARGEABLE SUPPLY: Performed by: SURGERY

## 2024-09-30 PROCEDURE — 2500000003 HC RX 250 WO HCPCS: Performed by: INTERNAL MEDICINE

## 2024-09-30 PROCEDURE — 36415 COLL VENOUS BLD VENIPUNCTURE: CPT

## 2024-09-30 PROCEDURE — 6360000004 HC RX CONTRAST MEDICATION: Performed by: STUDENT IN AN ORGANIZED HEALTH CARE EDUCATION/TRAINING PROGRAM

## 2024-09-30 PROCEDURE — 6360000002 HC RX W HCPCS

## 2024-09-30 PROCEDURE — 6360000002 HC RX W HCPCS: Performed by: NURSE ANESTHETIST, CERTIFIED REGISTERED

## 2024-09-30 PROCEDURE — 99285 EMERGENCY DEPT VISIT HI MDM: CPT

## 2024-09-30 PROCEDURE — 83605 ASSAY OF LACTIC ACID: CPT

## 2024-09-30 PROCEDURE — 0DTJ4ZZ RESECTION OF APPENDIX, PERCUTANEOUS ENDOSCOPIC APPROACH: ICD-10-PCS | Performed by: SURGERY

## 2024-09-30 PROCEDURE — 30283B1 TRANSFUSION OF NONAUTOLOGOUS 4-FACTOR PROTHROMBIN COMPLEX CONCENTRATE INTO VEIN, PERCUTANEOUS APPROACH: ICD-10-PCS | Performed by: HOSPITALIST

## 2024-09-30 PROCEDURE — 74177 CT ABD & PELVIS W/CONTRAST: CPT

## 2024-09-30 PROCEDURE — 6370000000 HC RX 637 (ALT 250 FOR IP): Performed by: INTERNAL MEDICINE

## 2024-09-30 PROCEDURE — 7100000000 HC PACU RECOVERY - FIRST 15 MIN: Performed by: SURGERY

## 2024-09-30 PROCEDURE — 6360000002 HC RX W HCPCS: Performed by: ANESTHESIOLOGY

## 2024-09-30 PROCEDURE — 3700000001 HC ADD 15 MINUTES (ANESTHESIA): Performed by: SURGERY

## 2024-09-30 PROCEDURE — 6360000002 HC RX W HCPCS: Performed by: STUDENT IN AN ORGANIZED HEALTH CARE EDUCATION/TRAINING PROGRAM

## 2024-09-30 PROCEDURE — 83690 ASSAY OF LIPASE: CPT

## 2024-09-30 PROCEDURE — 2580000003 HC RX 258: Performed by: STUDENT IN AN ORGANIZED HEALTH CARE EDUCATION/TRAINING PROGRAM

## 2024-09-30 PROCEDURE — 96375 TX/PRO/DX INJ NEW DRUG ADDON: CPT

## 2024-09-30 RX ORDER — ONDANSETRON 2 MG/ML
4 INJECTION INTRAMUSCULAR; INTRAVENOUS
Status: DISCONTINUED | OUTPATIENT
Start: 2024-09-30 | End: 2024-09-30 | Stop reason: HOSPADM

## 2024-09-30 RX ORDER — ROCURONIUM BROMIDE 10 MG/ML
INJECTION, SOLUTION INTRAVENOUS
Status: DISCONTINUED | OUTPATIENT
Start: 2024-09-30 | End: 2024-09-30 | Stop reason: SDUPTHER

## 2024-09-30 RX ORDER — VITAMIN B COMPLEX
1000 TABLET ORAL DAILY
Status: DISCONTINUED | OUTPATIENT
Start: 2024-09-30 | End: 2024-10-02 | Stop reason: HOSPADM

## 2024-09-30 RX ORDER — SODIUM CHLORIDE, SODIUM LACTATE, POTASSIUM CHLORIDE, CALCIUM CHLORIDE 600; 310; 30; 20 MG/100ML; MG/100ML; MG/100ML; MG/100ML
INJECTION, SOLUTION INTRAVENOUS CONTINUOUS
Status: DISCONTINUED | OUTPATIENT
Start: 2024-09-30 | End: 2024-09-30

## 2024-09-30 RX ORDER — M-VIT,TX,IRON,MINS/CALC/FOLIC 27MG-0.4MG
1 TABLET ORAL DAILY
Status: DISCONTINUED | OUTPATIENT
Start: 2024-09-30 | End: 2024-10-02 | Stop reason: HOSPADM

## 2024-09-30 RX ORDER — SUCCINYLCHOLINE/SOD CL,ISO/PF 100 MG/5ML
SYRINGE (ML) INTRAVENOUS
Status: DISCONTINUED | OUTPATIENT
Start: 2024-09-30 | End: 2024-09-30 | Stop reason: SDUPTHER

## 2024-09-30 RX ORDER — SODIUM CHLORIDE 9 MG/ML
INJECTION, SOLUTION INTRAVENOUS PRN
Status: DISCONTINUED | OUTPATIENT
Start: 2024-09-30 | End: 2024-09-30 | Stop reason: HOSPADM

## 2024-09-30 RX ORDER — HYDROMORPHONE HYDROCHLORIDE 1 MG/ML
0.25 INJECTION, SOLUTION INTRAMUSCULAR; INTRAVENOUS; SUBCUTANEOUS
Status: DISCONTINUED | OUTPATIENT
Start: 2024-09-30 | End: 2024-10-01

## 2024-09-30 RX ORDER — AMIODARONE HYDROCHLORIDE 200 MG/1
200 TABLET ORAL DAILY
Status: DISCONTINUED | OUTPATIENT
Start: 2024-09-30 | End: 2024-10-02 | Stop reason: HOSPADM

## 2024-09-30 RX ORDER — SENNOSIDES A AND B 8.6 MG/1
1 TABLET, FILM COATED ORAL DAILY PRN
Status: DISCONTINUED | OUTPATIENT
Start: 2024-09-30 | End: 2024-10-01

## 2024-09-30 RX ORDER — BUPIVACAINE HYDROCHLORIDE 5 MG/ML
INJECTION, SOLUTION EPIDURAL; INTRACAUDAL PRN
Status: DISCONTINUED | OUTPATIENT
Start: 2024-09-30 | End: 2024-09-30 | Stop reason: ALTCHOICE

## 2024-09-30 RX ORDER — ONDANSETRON 4 MG/1
4 TABLET, ORALLY DISINTEGRATING ORAL EVERY 8 HOURS PRN
Status: DISCONTINUED | OUTPATIENT
Start: 2024-09-30 | End: 2024-10-02 | Stop reason: HOSPADM

## 2024-09-30 RX ORDER — ACETAMINOPHEN 650 MG/1
650 SUPPOSITORY RECTAL EVERY 6 HOURS PRN
Status: DISCONTINUED | OUTPATIENT
Start: 2024-09-30 | End: 2024-10-02 | Stop reason: HOSPADM

## 2024-09-30 RX ORDER — ONDANSETRON 2 MG/ML
INJECTION INTRAMUSCULAR; INTRAVENOUS
Status: DISCONTINUED | OUTPATIENT
Start: 2024-09-30 | End: 2024-09-30 | Stop reason: SDUPTHER

## 2024-09-30 RX ORDER — HYDROCODONE BITARTRATE AND ACETAMINOPHEN 5; 325 MG/1; MG/1
1 TABLET ORAL EVERY 4 HOURS PRN
Status: DISCONTINUED | OUTPATIENT
Start: 2024-09-30 | End: 2024-10-02 | Stop reason: HOSPADM

## 2024-09-30 RX ORDER — DIPHENHYDRAMINE HYDROCHLORIDE 50 MG/ML
12.5 INJECTION INTRAMUSCULAR; INTRAVENOUS
Status: DISCONTINUED | OUTPATIENT
Start: 2024-09-30 | End: 2024-09-30 | Stop reason: HOSPADM

## 2024-09-30 RX ORDER — MIDAZOLAM HYDROCHLORIDE 1 MG/ML
INJECTION INTRAMUSCULAR; INTRAVENOUS
Status: DISCONTINUED | OUTPATIENT
Start: 2024-09-30 | End: 2024-09-30 | Stop reason: SDUPTHER

## 2024-09-30 RX ORDER — PRAVASTATIN SODIUM 20 MG
40 TABLET ORAL NIGHTLY
Status: DISCONTINUED | OUTPATIENT
Start: 2024-09-30 | End: 2024-10-02 | Stop reason: HOSPADM

## 2024-09-30 RX ORDER — FENTANYL CITRATE 50 UG/ML
25 INJECTION, SOLUTION INTRAMUSCULAR; INTRAVENOUS EVERY 5 MIN PRN
Status: COMPLETED | OUTPATIENT
Start: 2024-09-30 | End: 2024-09-30

## 2024-09-30 RX ORDER — SODIUM CHLORIDE, SODIUM LACTATE, POTASSIUM CHLORIDE, AND CALCIUM CHLORIDE .6; .31; .03; .02 G/100ML; G/100ML; G/100ML; G/100ML
500 INJECTION, SOLUTION INTRAVENOUS ONCE
Status: COMPLETED | OUTPATIENT
Start: 2024-09-30 | End: 2024-09-30

## 2024-09-30 RX ORDER — METRONIDAZOLE 500 MG/100ML
500 INJECTION, SOLUTION INTRAVENOUS EVERY 8 HOURS
Status: DISCONTINUED | OUTPATIENT
Start: 2024-09-30 | End: 2024-09-30

## 2024-09-30 RX ORDER — METRONIDAZOLE 500 MG/100ML
500 INJECTION, SOLUTION INTRAVENOUS
Status: COMPLETED | OUTPATIENT
Start: 2024-09-30 | End: 2024-09-30

## 2024-09-30 RX ORDER — ALBUTEROL SULFATE 0.83 MG/ML
2.5 SOLUTION RESPIRATORY (INHALATION) EVERY 6 HOURS PRN
Status: DISCONTINUED | OUTPATIENT
Start: 2024-09-30 | End: 2024-10-02 | Stop reason: HOSPADM

## 2024-09-30 RX ORDER — LIDOCAINE HYDROCHLORIDE 20 MG/ML
INJECTION, SOLUTION EPIDURAL; INFILTRATION; INTRACAUDAL; PERINEURAL
Status: DISCONTINUED | OUTPATIENT
Start: 2024-09-30 | End: 2024-09-30 | Stop reason: SDUPTHER

## 2024-09-30 RX ORDER — HYDROMORPHONE HYDROCHLORIDE 1 MG/ML
0.5 INJECTION, SOLUTION INTRAMUSCULAR; INTRAVENOUS; SUBCUTANEOUS EVERY 5 MIN PRN
Status: COMPLETED | OUTPATIENT
Start: 2024-09-30 | End: 2024-09-30

## 2024-09-30 RX ORDER — POLYETHYLENE GLYCOL 3350 17 G/17G
17 POWDER, FOR SOLUTION ORAL DAILY PRN
Status: DISCONTINUED | OUTPATIENT
Start: 2024-09-30 | End: 2024-10-02 | Stop reason: HOSPADM

## 2024-09-30 RX ORDER — 0.9 % SODIUM CHLORIDE 0.9 %
2100 INTRAVENOUS SOLUTION INTRAVENOUS ONCE
Status: COMPLETED | OUTPATIENT
Start: 2024-09-30 | End: 2024-09-30

## 2024-09-30 RX ORDER — PROPOFOL 10 MG/ML
INJECTION, EMULSION INTRAVENOUS
Status: DISCONTINUED | OUTPATIENT
Start: 2024-09-30 | End: 2024-09-30 | Stop reason: SDUPTHER

## 2024-09-30 RX ORDER — ACETAMINOPHEN 325 MG/1
650 TABLET ORAL EVERY 6 HOURS PRN
Status: DISCONTINUED | OUTPATIENT
Start: 2024-09-30 | End: 2024-10-02 | Stop reason: HOSPADM

## 2024-09-30 RX ORDER — ALBUTEROL SULFATE 90 UG/1
2 INHALANT RESPIRATORY (INHALATION) EVERY 6 HOURS PRN
Status: DISCONTINUED | OUTPATIENT
Start: 2024-09-30 | End: 2024-09-30 | Stop reason: SDUPTHER

## 2024-09-30 RX ORDER — OXYCODONE HYDROCHLORIDE 5 MG/1
10 TABLET ORAL
Status: DISCONTINUED | OUTPATIENT
Start: 2024-09-30 | End: 2024-10-01

## 2024-09-30 RX ORDER — NALOXONE HYDROCHLORIDE 0.4 MG/ML
INJECTION, SOLUTION INTRAMUSCULAR; INTRAVENOUS; SUBCUTANEOUS PRN
Status: DISCONTINUED | OUTPATIENT
Start: 2024-09-30 | End: 2024-09-30 | Stop reason: HOSPADM

## 2024-09-30 RX ORDER — ONDANSETRON 2 MG/ML
4 INJECTION INTRAMUSCULAR; INTRAVENOUS EVERY 6 HOURS PRN
Status: DISCONTINUED | OUTPATIENT
Start: 2024-09-30 | End: 2024-10-02 | Stop reason: HOSPADM

## 2024-09-30 RX ORDER — FENTANYL CITRATE 50 UG/ML
50 INJECTION, SOLUTION INTRAMUSCULAR; INTRAVENOUS ONCE
Status: COMPLETED | OUTPATIENT
Start: 2024-09-30 | End: 2024-09-30

## 2024-09-30 RX ORDER — DEXAMETHASONE SODIUM PHOSPHATE 4 MG/ML
INJECTION, SOLUTION INTRA-ARTICULAR; INTRALESIONAL; INTRAMUSCULAR; INTRAVENOUS; SOFT TISSUE
Status: DISCONTINUED | OUTPATIENT
Start: 2024-09-30 | End: 2024-09-30 | Stop reason: SDUPTHER

## 2024-09-30 RX ORDER — IOPAMIDOL 755 MG/ML
100 INJECTION, SOLUTION INTRAVASCULAR
Status: COMPLETED | OUTPATIENT
Start: 2024-09-30 | End: 2024-09-30

## 2024-09-30 RX ORDER — FENTANYL CITRATE 50 UG/ML
INJECTION, SOLUTION INTRAMUSCULAR; INTRAVENOUS
Status: DISCONTINUED | OUTPATIENT
Start: 2024-09-30 | End: 2024-09-30 | Stop reason: SDUPTHER

## 2024-09-30 RX ORDER — SODIUM CHLORIDE 0.9 % (FLUSH) 0.9 %
5-40 SYRINGE (ML) INJECTION EVERY 12 HOURS SCHEDULED
Status: DISCONTINUED | OUTPATIENT
Start: 2024-09-30 | End: 2024-09-30 | Stop reason: HOSPADM

## 2024-09-30 RX ORDER — TIRZEPATIDE 2.5 MG/.5ML
2.5 INJECTION, SOLUTION SUBCUTANEOUS WEEKLY
COMMUNITY

## 2024-09-30 RX ORDER — 0.9 % SODIUM CHLORIDE 0.9 %
1000 INTRAVENOUS SOLUTION INTRAVENOUS ONCE
Status: COMPLETED | OUTPATIENT
Start: 2024-09-30 | End: 2024-09-30

## 2024-09-30 RX ORDER — SODIUM CHLORIDE, SODIUM LACTATE, POTASSIUM CHLORIDE, CALCIUM CHLORIDE 600; 310; 30; 20 MG/100ML; MG/100ML; MG/100ML; MG/100ML
INJECTION, SOLUTION INTRAVENOUS CONTINUOUS
Status: DISCONTINUED | OUTPATIENT
Start: 2024-09-30 | End: 2024-10-01

## 2024-09-30 RX ORDER — ENOXAPARIN SODIUM 100 MG/ML
40 INJECTION SUBCUTANEOUS DAILY
Status: DISCONTINUED | OUTPATIENT
Start: 2024-10-01 | End: 2024-10-02 | Stop reason: HOSPADM

## 2024-09-30 RX ORDER — METOPROLOL TARTRATE 25 MG/1
25 TABLET, FILM COATED ORAL 2 TIMES DAILY
Status: DISCONTINUED | OUTPATIENT
Start: 2024-09-30 | End: 2024-10-02 | Stop reason: HOSPADM

## 2024-09-30 RX ORDER — HYDROMORPHONE HYDROCHLORIDE 1 MG/ML
0.5 INJECTION, SOLUTION INTRAMUSCULAR; INTRAVENOUS; SUBCUTANEOUS
Status: DISCONTINUED | OUTPATIENT
Start: 2024-09-30 | End: 2024-10-01

## 2024-09-30 RX ORDER — OXYCODONE HYDROCHLORIDE 5 MG/1
5 TABLET ORAL
Status: DISCONTINUED | OUTPATIENT
Start: 2024-09-30 | End: 2024-10-01

## 2024-09-30 RX ORDER — DULOXETIN HYDROCHLORIDE 30 MG/1
90 CAPSULE, DELAYED RELEASE ORAL DAILY
Status: DISCONTINUED | OUTPATIENT
Start: 2024-09-30 | End: 2024-10-02 | Stop reason: HOSPADM

## 2024-09-30 RX ORDER — GAUZE BANDAGE 2" X 2"
100 BANDAGE TOPICAL DAILY
Status: DISCONTINUED | OUTPATIENT
Start: 2024-09-30 | End: 2024-10-02 | Stop reason: HOSPADM

## 2024-09-30 RX ORDER — PHENYLEPHRINE HCL IN 0.9% NACL 1 MG/10 ML
SYRINGE (ML) INTRAVENOUS
Status: DISCONTINUED | OUTPATIENT
Start: 2024-09-30 | End: 2024-09-30 | Stop reason: SDUPTHER

## 2024-09-30 RX ORDER — SODIUM CHLORIDE 0.9 % (FLUSH) 0.9 %
5-40 SYRINGE (ML) INJECTION PRN
Status: DISCONTINUED | OUTPATIENT
Start: 2024-09-30 | End: 2024-09-30 | Stop reason: HOSPADM

## 2024-09-30 RX ADMIN — FENTANYL CITRATE 50 MCG: 50 INJECTION, SOLUTION INTRAMUSCULAR; INTRAVENOUS at 17:06

## 2024-09-30 RX ADMIN — OXYCODONE HYDROCHLORIDE 10 MG: 5 TABLET ORAL at 20:44

## 2024-09-30 RX ADMIN — Medication 100 MG: at 14:09

## 2024-09-30 RX ADMIN — SUGAMMADEX 200 MG: 100 INJECTION, SOLUTION INTRAVENOUS at 18:12

## 2024-09-30 RX ADMIN — FAMOTIDINE 20 MG: 10 INJECTION, SOLUTION INTRAVENOUS at 20:25

## 2024-09-30 RX ADMIN — METOPROLOL TARTRATE 25 MG: 25 TABLET, FILM COATED ORAL at 20:25

## 2024-09-30 RX ADMIN — FENTANYL CITRATE 25 MCG: 50 INJECTION INTRAMUSCULAR; INTRAVENOUS at 18:35

## 2024-09-30 RX ADMIN — PIPERACILLIN AND TAZOBACTAM 3375 MG: 3; .375 INJECTION, POWDER, LYOPHILIZED, FOR SOLUTION INTRAVENOUS at 23:47

## 2024-09-30 RX ADMIN — PROTHROMBIN, COAGULATION FACTOR VII HUMAN, COAGULATION FACTOR IX HUMAN, COAGULATION FACTOR X HUMAN, PROTEIN C, PROTEIN S HUMAN, AND WATER 2000 UNITS: KIT at 14:10

## 2024-09-30 RX ADMIN — LIDOCAINE HYDROCHLORIDE 80 MG: 20 INJECTION, SOLUTION EPIDURAL; INFILTRATION; INTRACAUDAL; PERINEURAL at 16:34

## 2024-09-30 RX ADMIN — HYDROMORPHONE HYDROCHLORIDE 0.5 MG: 1 INJECTION, SOLUTION INTRAMUSCULAR; INTRAVENOUS; SUBCUTANEOUS at 19:11

## 2024-09-30 RX ADMIN — PRAVASTATIN SODIUM 40 MG: 20 TABLET ORAL at 20:24

## 2024-09-30 RX ADMIN — Medication 100 MG: at 16:35

## 2024-09-30 RX ADMIN — FAMOTIDINE 20 MG: 10 INJECTION, SOLUTION INTRAVENOUS at 14:10

## 2024-09-30 RX ADMIN — PIPERACILLIN AND TAZOBACTAM 3375 MG: 3; .375 INJECTION, POWDER, LYOPHILIZED, FOR SOLUTION INTRAVENOUS; PARENTERAL at 16:40

## 2024-09-30 RX ADMIN — Medication 100 MCG: at 16:47

## 2024-09-30 RX ADMIN — IOPAMIDOL 100 ML: 755 INJECTION, SOLUTION INTRAVENOUS at 10:40

## 2024-09-30 RX ADMIN — FENTANYL CITRATE 50 MCG: 50 INJECTION INTRAMUSCULAR; INTRAVENOUS at 09:54

## 2024-09-30 RX ADMIN — HYDROMORPHONE HYDROCHLORIDE 0.5 MG: 1 INJECTION, SOLUTION INTRAMUSCULAR; INTRAVENOUS; SUBCUTANEOUS at 18:56

## 2024-09-30 RX ADMIN — Medication 100 MCG: at 16:41

## 2024-09-30 RX ADMIN — ROCURONIUM BROMIDE 45 MG: 10 INJECTION, SOLUTION INTRAVENOUS at 16:40

## 2024-09-30 RX ADMIN — AMIODARONE HYDROCHLORIDE 200 MG: 200 TABLET ORAL at 14:09

## 2024-09-30 RX ADMIN — SODIUM CHLORIDE 2100 ML: 9 INJECTION, SOLUTION INTRAVENOUS at 11:24

## 2024-09-30 RX ADMIN — SODIUM CHLORIDE, SODIUM LACTATE, POTASSIUM CHLORIDE, AND CALCIUM CHLORIDE: 600; 310; 30; 20 INJECTION, SOLUTION INTRAVENOUS at 14:12

## 2024-09-30 RX ADMIN — ROCURONIUM BROMIDE 10 MG: 10 INJECTION, SOLUTION INTRAVENOUS at 17:35

## 2024-09-30 RX ADMIN — ONDANSETRON HYDROCHLORIDE 4 MG: 2 INJECTION INTRAMUSCULAR; INTRAVENOUS at 18:08

## 2024-09-30 RX ADMIN — MIDAZOLAM 2 MG: 1 INJECTION INTRAMUSCULAR; INTRAVENOUS at 16:27

## 2024-09-30 RX ADMIN — SODIUM CHLORIDE, SODIUM LACTATE, POTASSIUM CHLORIDE, AND CALCIUM CHLORIDE: 600; 310; 30; 20 INJECTION, SOLUTION INTRAVENOUS at 17:45

## 2024-09-30 RX ADMIN — METRONIDAZOLE 500 MG: 500 INJECTION, SOLUTION INTRAVENOUS at 11:23

## 2024-09-30 RX ADMIN — HYDROMORPHONE HYDROCHLORIDE 0.5 MG: 1 INJECTION, SOLUTION INTRAMUSCULAR; INTRAVENOUS; SUBCUTANEOUS at 19:16

## 2024-09-30 RX ADMIN — Medication 1000 UNITS: at 14:09

## 2024-09-30 RX ADMIN — WATER 1000 MG: 1 INJECTION INTRAMUSCULAR; INTRAVENOUS; SUBCUTANEOUS at 11:23

## 2024-09-30 RX ADMIN — Medication 1 TABLET: at 14:09

## 2024-09-30 RX ADMIN — FENTANYL CITRATE 50 MCG: 50 INJECTION, SOLUTION INTRAMUSCULAR; INTRAVENOUS at 16:30

## 2024-09-30 RX ADMIN — SODIUM CHLORIDE 1000 ML: 9 INJECTION, SOLUTION INTRAVENOUS at 09:55

## 2024-09-30 RX ADMIN — HYDROMORPHONE HYDROCHLORIDE 0.5 MG: 1 INJECTION, SOLUTION INTRAMUSCULAR; INTRAVENOUS; SUBCUTANEOUS at 19:01

## 2024-09-30 RX ADMIN — SODIUM CHLORIDE, SODIUM LACTATE, POTASSIUM CHLORIDE, AND CALCIUM CHLORIDE: 600; 310; 30; 20 INJECTION, SOLUTION INTRAVENOUS at 20:05

## 2024-09-30 RX ADMIN — FENTANYL CITRATE 25 MCG: 50 INJECTION INTRAMUSCULAR; INTRAVENOUS at 18:46

## 2024-09-30 RX ADMIN — FENTANYL CITRATE 25 MCG: 50 INJECTION INTRAMUSCULAR; INTRAVENOUS at 18:51

## 2024-09-30 RX ADMIN — PROPOFOL 150 MG: 10 INJECTION, EMULSION INTRAVENOUS at 16:34

## 2024-09-30 RX ADMIN — HYDROMORPHONE HYDROCHLORIDE 0.5 MG: 1 INJECTION, SOLUTION INTRAMUSCULAR; INTRAVENOUS; SUBCUTANEOUS at 23:40

## 2024-09-30 RX ADMIN — SODIUM CHLORIDE, POTASSIUM CHLORIDE, SODIUM LACTATE AND CALCIUM CHLORIDE 500 ML: 600; 310; 30; 20 INJECTION, SOLUTION INTRAVENOUS at 14:11

## 2024-09-30 RX ADMIN — FENTANYL CITRATE 25 MCG: 50 INJECTION INTRAMUSCULAR; INTRAVENOUS at 18:40

## 2024-09-30 RX ADMIN — ROCURONIUM BROMIDE 5 MG: 10 INJECTION, SOLUTION INTRAVENOUS at 16:30

## 2024-09-30 RX ADMIN — DEXAMETHASONE SODIUM PHOSPHATE 4 MG: 4 INJECTION, SOLUTION INTRAMUSCULAR; INTRAVENOUS at 16:40

## 2024-09-30 ASSESSMENT — PAIN DESCRIPTION - DESCRIPTORS
DESCRIPTORS: ACHING
DESCRIPTORS: SORE
DESCRIPTORS: ACHING
DESCRIPTORS: ACHING
DESCRIPTORS: ACHING;SORE
DESCRIPTORS: ACHING
DESCRIPTORS: ACHING

## 2024-09-30 ASSESSMENT — PAIN - FUNCTIONAL ASSESSMENT
PAIN_FUNCTIONAL_ASSESSMENT: ACTIVITIES ARE NOT PREVENTED
PAIN_FUNCTIONAL_ASSESSMENT: PREVENTS OR INTERFERES SOME ACTIVE ACTIVITIES AND ADLS
PAIN_FUNCTIONAL_ASSESSMENT: ACTIVITIES ARE NOT PREVENTED
PAIN_FUNCTIONAL_ASSESSMENT: PREVENTS OR INTERFERES SOME ACTIVE ACTIVITIES AND ADLS
PAIN_FUNCTIONAL_ASSESSMENT: ACTIVITIES ARE NOT PREVENTED
PAIN_FUNCTIONAL_ASSESSMENT: ACTIVITIES ARE NOT PREVENTED
PAIN_FUNCTIONAL_ASSESSMENT: 0-10

## 2024-09-30 ASSESSMENT — PAIN SCALES - GENERAL
PAINLEVEL_OUTOF10: 0
PAINLEVEL_OUTOF10: 1
PAINLEVEL_OUTOF10: 0
PAINLEVEL_OUTOF10: 7
PAINLEVEL_OUTOF10: 4
PAINLEVEL_OUTOF10: 5
PAINLEVEL_OUTOF10: 0
PAINLEVEL_OUTOF10: 7
PAINLEVEL_OUTOF10: 0
PAINLEVEL_OUTOF10: 7
PAINLEVEL_OUTOF10: 4
PAINLEVEL_OUTOF10: 6
PAINLEVEL_OUTOF10: 6
PAINLEVEL_OUTOF10: 7
PAINLEVEL_OUTOF10: 6
PAINLEVEL_OUTOF10: 6

## 2024-09-30 ASSESSMENT — PAIN DESCRIPTION - FREQUENCY
FREQUENCY: CONTINUOUS

## 2024-09-30 ASSESSMENT — PAIN DESCRIPTION - ONSET
ONSET: ON-GOING

## 2024-09-30 ASSESSMENT — PAIN DESCRIPTION - PAIN TYPE
TYPE: SURGICAL PAIN

## 2024-09-30 ASSESSMENT — PAIN DESCRIPTION - LOCATION
LOCATION: ABDOMEN

## 2024-09-30 NOTE — PROGRESS NOTES
Pt needs to go to OR unless Hospitalist states he is too high risk as a surgical candidate.  Please note in chart.  Recommend reversal of xarelto and lap appy.

## 2024-09-30 NOTE — PERIOP NOTE
TRANSFER - OUT REPORT:    Verbal report given to Sourav OCHOA on Julien Avila  being transferred to [address] [place] [address] for routine post-op       Report consisted of patient's Situation, Background, Assessment and   Recommendations(SBAR).     Information from the following report(s) Nurse Handoff Report, Adult Overview, Surgery Report, Intake/Output, MAR, and Recent Results was reviewed with the receiving nurse.           Lines:   Peripheral IV 09/30/24 Left Antecubital (Active)   Site Assessment Clean, dry & intact 09/30/24 1921   Line Status Infusing 09/30/24 1921   Phlebitis Assessment No symptoms 09/30/24 1921   Infiltration Assessment 0 09/30/24 1921   Dressing Status Clean, dry & intact 09/30/24 1921   Dressing Type Transparent 09/30/24 1921        Opportunity for questions and clarification was provided.      Patient transported with:  O2 @ 2lpm and Registered Nurse

## 2024-09-30 NOTE — ED TRIAGE NOTES
Pt sts he started to have sharp pain in right lower quad. Sts it is inconsistent. Sts worse when he stands and tries to move. No complaints of nvd. No fever at home. Last bm was yesterday - normal per patient. No urinary complaints.

## 2024-09-30 NOTE — OP NOTE
OPERATIVE NOTE    Patient: Julien Avila MRN: [id number]  [facility]: [id number]    YOB: 1945  Age: 78 y.o.  Sex: male      Indications: This is a 78 y.o. year-old male who presents with acute abdominal pain and found to have appendicitis on CAT scan.    Date of Procedure: 9/30/2024     Preoperative Diagnosis: Acute appendicitis    Postoperative Diagnosis: Acute appendicitis      Procedure: Procedure(s):  APPENDECTOMY LAPAROSCOPIC    Surgeon(s): Surgeons and Role:     * Akiko Arcos DO - Primary    Assistant(s): Circulator: Bella Hines RN  Surgical Assistant: Xiomy Flores Scrub: Brianna Miles  Scrub Person First: John Thayer    Anesthesia: General     Procedure: After obtaining informed consent and properly identifying the patient and area of focus, the patient was brought to the operating room and placed in the operating table in the supine position.  Pre-operative antibiotics were dosed prior to the incision. The patient was secured with a lower body strap and all pressure points were padded. A brett hugger warming unit was placed across the upper body and SCD’s were placed to bilateral lower extremities. General anesthesia was then induced without complication. The patient’s abdomen was prepped and draped in the standard sterile fashion. A surgical timeout was completed.  A small incision was made below the umbilicus and the abdomen was entered using the Lee open technique and a 12mm trocar was placed without difficulty. Another 5 mm port was placed in the epigastrium of the abdomen and the LUQ due to pt body habitus. The appendix was visualized and found to be acutely inflamed with significant phlegmon and infection. The remainder of the abdominal examination was unremarkable. The appendix was bluntly dissected away from the surrounding phlegmon and was controlled and retracted using a laparoscopic grasper. The mesoappendix was ligated and divided using the Harmonic scalpel.

## 2024-09-30 NOTE — H&P
necrosis and possible impending rupture. 2. 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 3. Diverticulosis of the colon. Electronically signed by Efrain Carver MD     CT ABDOMEN PELVIS W IV CONTRAST Additional Contrast? None   Final Result      1. Acute appendicitis. No evidence of abscess or rupture at this time. However,   there our areas of focal indistinctness of the wall, concerning for wall   necrosis and possible impending rupture.   2. Chronic posttraumatic changes at the left lung base and involving the left   ribs.   3. Diverticulosis of the colon.      Electronically signed by Efrain Carver MD          LAST EKG  Results for orders placed or performed during the hospital encounter of 09/30/24   EKG 12 Lead “IF” over 40 years of age, and Upper Abd pain, SOB, Diaphoresis, Diabetes, or Tachycardia greater than or equal to 120 bmp. (Use as indication for order).   Result Value Ref Range    Ventricular Rate 78 BPM    Atrial Rate 78 BPM    P-R Interval 182 ms    QRS Duration 94 ms    Q-T Interval 454 ms    QTc Calculation (Bazett) 517 ms    P Axis 106 degrees    R Axis -4 degrees    T Axis 54 degrees    Diagnosis       Normal sinus rhythm  Normal ECG  When compared with ECG of 21-AUG-2024 09:41,  PA interval has decreased  QT has lengthened          CT ABDOMEN PELVIS W IV CONTRAST Additional Contrast? None    Result Date: 9/30/2024  EXAM: CT ABDOMEN PELVIS W IV CONTRAST INDICATION: rlq pain and luq pain COMPARISON: CT May 2013 CONTRAST: 100 mL of Isovue-370. ORAL CONTRAST: Not given TECHNIQUE: Following intravenous administration of contrast, thin axial images were obtained through the abdomen and pelvis. Coronal and sagittal reconstructions were generated. CT dose reduction was achieved through use of a standardized protocol tailored for this examination and automatic exposure control for dose modulation. FINDINGS: LOWER THORAX: Fat-containing left basilar diaphragmatic hernia

## 2024-09-30 NOTE — ED PROVIDER NOTES
(DILAUDID) injection 0.5 mg (0.5 mg IntraVENous Given 9/30/24 1916)       CONSULTS: (Who and What was discussed)  IP CONSULT TO GENERAL SURGERY  IP CONSULT TO HOSPITALIST  IP CONSULT TO GENERAL SURGERY    Chronic Conditions: none     Social Determinants affecting Dx or Tx: None    Records Reviewed (source and summary of external notes):  none     Procedures    Critical Care Time: Authorized and Performed by: j carlos campbell   Total critical care time:  50  minutes excluding all other billable procedures   Due to a high probability of clinically significant, life threatening deterioration, the patient required my highest level of preparedness to intervene emergently and I personally spent this critical care time directly and personally managing the patient. This critical care time included obtaining a history; examining the patient; pulse oximetry; ordering and review of studies; arranging urgent treatment with development of a management plan; evaluation of patient's response to treatment; frequent reassessment; and, discussions with other providers.  This critical care time was performed to assess and manage the high probability of imminent, life-threatening deterioration that could result in multi-organ failure. It was exclusive of separately billable procedures and treating other patients and teaching time.      SCREENING TOOLS:  None    CLINICAL MANAGEMENT TOOLS:  Not Applicable      Diagnosis     Clinical Impression:   1. Acute appendicitis, unspecified acute appendicitis type    2. Leukocytosis, unspecified type    3. SIRS (systemic inflammatory response syndrome) (HCC)        Disposition: admission     No follow-up provider specified.     Disclaimer: Sections of this note are dictated using utilizing voice recognition software.  Minor typographical errors may be present. If questions arise, please do not hesitate to contact me or call our department.          J Carlos Campbell MD  10/01/24 [id number]

## 2024-09-30 NOTE — ANESTHESIA POSTPROCEDURE EVALUATION
Department of Anesthesiology  Postprocedure Note    Patient: Julien Avila  MRN: [id number]  YOB: 1945  Date of evaluation: 9/30/2024  Post-Anesthesia Evaluation and Assessment    Cardiovascular Function/Vital Signs  Visit Vitals  BP (!) 165/84   Pulse 77   Temp 37.2 °C (99 °F)   Resp 14   Ht 1.753 m (5' 9\")   Wt 103.9 kg (229 lb)   SpO2 96%   BMI 33.82 kg/m²       Patient is status post Procedure(s):  APPENDECTOMY LAPAROSCOPIC.    Nausea/Vomiting: Controlled.    Postoperative hydration reviewed and adequate.    Pain:      Managed.    Neurological Status:       At baseline.    Mental Status and Level of Consciousness: Arousable.    Pulmonary Status:       Adequate oxygenation and airway patent.    Complications related to anesthesia: None    Post-anesthesia assessment completed. No concerns.    Patient has met all discharge requirements.    Signed By: Bhargavi Henry, SOFIA - CRNA    September 30, 2024

## 2024-09-30 NOTE — ANESTHESIA PRE PROCEDURE
Department of Anesthesiology  Preprocedure Note       Name:  Julien Avila   Age:  78 y.o.  :  1945                                          MRN:  [id number]         Date:  2024      Surgeon: Surgeon(s):  Akiko Arcos DO    Procedure: Procedure(s):  APPENDECTOMY LAPAROSCOPIC    Medications prior to admission:   Prior to Admission medications    Medication Sig Start Date End Date Taking? Authorizing Provider   Tirzepatide (MOUNJARO) 2.5 MG/0.5ML SOPN SC injection Inject 0.5 mLs into the skin once a week   Yes Provider, MD Diane   metoprolol tartrate (LOPRESSOR) 25 MG tablet Take 1 tablet by mouth 2 times daily   Yes ProviderDiane MD   potassium chloride (KLOR-CON) 20 MEQ packet Take 20 mEq by mouth daily   Yes ProviderDiane MD   indapamide (LOZOL) 2.5 MG tablet Take 1 tablet by mouth daily   Yes ProviderDiane MD   amiodarone (CORDARONE) 200 MG tablet Take 1 tablet by mouth daily 24  Yes Luis Carlos Campa APRN - CNP   vitamin B-1 (THIAMINE) 100 MG tablet Take 1 tablet by mouth daily 24  Yes Luis Carlos Campa APRN - CNP   rivaroxaban (XARELTO) 20 MG TABS tablet Take 1 tablet by mouth daily 24  Yes Sepideh Aguilar,    Multiple Vitamins-Minerals (THERAPEUTIC MULTIVITAMIN-MINERALS) tablet Take 1 tablet by mouth daily 24  Yes Ishan Lazo MD   vitamin D 25 MCG (1000 UT) CAPS Take 1 capsule by mouth daily   Yes Automatic Reconciliation, Ar   DULoxetine (CYMBALTA) 60 MG extended release capsule Take 90 mg by mouth daily   Yes Automatic Reconciliation, Ar   pravastatin (PRAVACHOL) 40 MG tablet Take 1 tablet by mouth daily 2/10/15  Yes Automatic Reconciliation, Ar   dilTIAZem (CARDIZEM) 120 MG tablet Take 120 mg by mouth daily.  Patient not taking: Reported on 2024    ProviderDiane MD   dilTIAZem (CARDIZEM CD) 120 MG extended release capsule Take 1 capsule by mouth daily 24  Dell Bailey MD

## 2024-09-30 NOTE — CONSULTS
Surgery Consult    Subjective:      78-year-old male with history of diverticulosis, osteoarthritis, CVA, pleural effusion, A-fib on Xarelto who presents with right lower quadrant pain. Patient had the symptoms started yesterday. Denies any changes in meds, sick, or any other changes. Pain is 10 out of 10, throbbing. Movement aggravates symptoms. No alleviating factors. When assessing ROS he denies any nausea, vomiting, chest pain, shortness of breath, change abdominal, dysuria, or any other changes.     Patient Active Problem List    Diagnosis Date Noted    Sepsis ([facility]) 09/30/2024    Acute respiratory failure with hypoxia 05/13/2024    Pleural effusion 05/13/2024    Hemothorax on right 05/13/2024    Obstructive sleep apnea 05/13/2024    Closed fracture of multiple ribs of left side 05/13/2024    Closed fracture of thoracic vertebra ([facility]) 05/13/2024    Normocytic anemia 05/13/2024    Anxiety and depression 05/13/2024    Osteoarthritis of right hip 02/15/2021    Diverticulosis of colon 08/19/2015    Near syncope 03/30/2014    Hypopotassemia 06/06/2013    Bipolar disorder ([facility]) 05/26/2013    HTN (hypertension) 05/26/2013    Cerebrovascular disease 05/26/2013    Long term (current) use of anticoagulants     LVH (left ventricular hypertrophy)     SOB (shortness of breath) 02/10/2011    Chest pain 02/09/2011    Atrial fibrillation ([facility]) 02/09/2011     Past Medical History:   Diagnosis Date    Acid reflux     Adverse effect of anesthesia     hangover from anes    Arthritis     Atrial fibrillation ([facility])     Paroxysmal    Cancer ([facility])     skin    Chest pain 2/9/2011    DVT of lower extremity (deep venous thrombosis) ([facility])     Right, after horse stepped on me    Hyperlipidemia     Hypertension     Hypopotassemia 6/6/2013    IBS (irritable bowel syndrome)     constipation    Long term (current) use of anticoagulants     LVH (left ventricular hypertrophy)     Near syncope 3/30/2014    Osteoarthritis of right hip 2/15/2021

## 2024-09-30 NOTE — BRIEF OP NOTE
Brief Postoperative Note      Patient: Julien Avila  YOB: 1945  MRN: [id number]    Date of Procedure: 9/30/2024    Pre-Op Diagnosis Codes:      * Acute appendicitis [K35.80]    Post-Op Diagnosis: Post-Op Diagnosis Codes:     * Acute appendicitis [K35.80]       Procedure(s):  APPENDECTOMY LAPAROSCOPIC    Surgeon(s):  Akiko Arcos DO    Assistant:  Surgical Assistant: Xiomy Flores    Anesthesia: General    Estimated Blood Loss (mL): Minimal    Complications: None    Specimens:   ID Type Source Tests Collected by Time Destination   A : APPENDIX Tissue Appendix SURGICAL PATHOLOGY Akiko Arcos DO 9/30/2024 1804        Implants:  * No implants in log *      Drains:   Closed/Suction Drain RUQ Bulb (Active)       [REMOVED] Chest Tube Right Fourth intercostal space;Midaxillary (Removed)       [REMOVED] External Urinary Catheter (Removed)       Findings:  Infection Present At Time Of Surgery (PATOS) (choose all levels that have infection present):  - Organ Space infection (below fascia) present as evidenced by phlegmon and perforated acute appendicitis (microperforation with necrosis)  Other Findings: none    Electronically signed by Akiko Arcos DO on 9/30/2024 at 6:13 PM

## 2024-09-30 NOTE — PERIOP NOTE
TRANSFER - IN REPORT:    Verbal report received from ER RN on Julien Avila  being received from ER for routine progression of patient care      Report consisted of patient's Situation, Background, Assessment and   Recommendations(SBAR).     Information from the following report(s) Adult Overview, Surgery Report, Intake/Output, and MAR was reviewed with the receiving nurse.    Opportunity for questions and clarification was provided.      Assessment completed upon patient's arrival to unit and care assumed.

## 2024-10-01 PROBLEM — G47.33 OBSTRUCTIVE SLEEP APNEA: Chronic | Status: ACTIVE | Noted: 2024-05-13

## 2024-10-01 LAB
ANION GAP SERPL CALC-SCNC: 7 MMOL/L (ref 3–18)
BASOPHILS # BLD: 0 K/UL (ref 0–0.1)
BASOPHILS NFR BLD: 0 % (ref 0–2)
BUN SERPL-MCNC: 10 MG/DL (ref 7–18)
BUN/CREAT SERPL: 11 (ref 12–20)
CALCIUM SERPL-MCNC: 8.5 MG/DL (ref 8.5–10.1)
CHLORIDE SERPL-SCNC: 102 MMOL/L (ref 100–111)
CO2 SERPL-SCNC: 26 MMOL/L (ref 21–32)
CREAT SERPL-MCNC: 0.88 MG/DL (ref 0.6–1.3)
DIFFERENTIAL METHOD BLD: ABNORMAL
EKG ATRIAL RATE: 78 BPM
EKG DIAGNOSIS: NORMAL
EKG P AXIS: 106 DEGREES
EKG P-R INTERVAL: 182 MS
EKG Q-T INTERVAL: 454 MS
EKG QRS DURATION: 94 MS
EKG QTC CALCULATION (BAZETT): 517 MS
EKG R AXIS: -4 DEGREES
EKG T AXIS: 54 DEGREES
EKG VENTRICULAR RATE: 78 BPM
EOSINOPHIL # BLD: 0 K/UL (ref 0–0.4)
EOSINOPHIL NFR BLD: 0 % (ref 0–5)
ERYTHROCYTE [DISTWIDTH] IN BLOOD BY AUTOMATED COUNT: 13.1 % (ref 11.6–14.5)
GLUCOSE SERPL-MCNC: 135 MG/DL (ref 74–99)
HCT VFR BLD AUTO: 40.2 % (ref 36–48)
HGB BLD-MCNC: 13.7 G/DL (ref 13–16)
IMM GRANULOCYTES # BLD AUTO: 0.1 K/UL (ref 0–0.04)
IMM GRANULOCYTES NFR BLD AUTO: 0 % (ref 0–0.5)
LYMPHOCYTES # BLD: 0.6 K/UL (ref 0.9–3.6)
LYMPHOCYTES NFR BLD: 4 % (ref 21–52)
MAGNESIUM SERPL-MCNC: 2.5 MG/DL (ref 1.6–2.6)
MCH RBC QN AUTO: 31.4 PG (ref 24–34)
MCHC RBC AUTO-ENTMCNC: 34.1 G/DL (ref 31–37)
MCV RBC AUTO: 92.2 FL (ref 78–100)
MONOCYTES # BLD: 0.8 K/UL (ref 0.05–1.2)
MONOCYTES NFR BLD: 5 % (ref 3–10)
NEUTS SEG # BLD: 14.7 K/UL (ref 1.8–8)
NEUTS SEG NFR BLD: 90 % (ref 40–73)
NRBC # BLD: 0 K/UL (ref 0–0.01)
NRBC BLD-RTO: 0 PER 100 WBC
PLATELET # BLD AUTO: 229 K/UL (ref 135–420)
PMV BLD AUTO: 9 FL (ref 9.2–11.8)
POTASSIUM SERPL-SCNC: 3.8 MMOL/L (ref 3.5–5.5)
RBC # BLD AUTO: 4.36 M/UL (ref 4.35–5.65)
SODIUM SERPL-SCNC: 135 MMOL/L (ref 136–145)
WBC # BLD AUTO: 16.2 K/UL (ref 4.6–13.2)

## 2024-10-01 PROCEDURE — 94660 CPAP INITIATION&MGMT: CPT

## 2024-10-01 PROCEDURE — 80048 BASIC METABOLIC PNL TOTAL CA: CPT

## 2024-10-01 PROCEDURE — 6370000000 HC RX 637 (ALT 250 FOR IP): Performed by: INTERNAL MEDICINE

## 2024-10-01 PROCEDURE — 6360000002 HC RX W HCPCS: Performed by: INTERNAL MEDICINE

## 2024-10-01 PROCEDURE — 2580000003 HC RX 258: Performed by: SURGERY

## 2024-10-01 PROCEDURE — 6360000002 HC RX W HCPCS: Performed by: SURGERY

## 2024-10-01 PROCEDURE — 2700000000 HC OXYGEN THERAPY PER DAY

## 2024-10-01 PROCEDURE — 36415 COLL VENOUS BLD VENIPUNCTURE: CPT

## 2024-10-01 PROCEDURE — 2580000003 HC RX 258: Performed by: INTERNAL MEDICINE

## 2024-10-01 PROCEDURE — 1100000003 HC PRIVATE W/ TELEMETRY

## 2024-10-01 PROCEDURE — 83735 ASSAY OF MAGNESIUM: CPT

## 2024-10-01 PROCEDURE — 85025 COMPLETE CBC W/AUTO DIFF WBC: CPT

## 2024-10-01 PROCEDURE — 2500000003 HC RX 250 WO HCPCS: Performed by: INTERNAL MEDICINE

## 2024-10-01 RX ORDER — SODIUM CHLORIDE 9 MG/ML
INJECTION, SOLUTION INTRAVENOUS CONTINUOUS
Status: DISPENSED | OUTPATIENT
Start: 2024-10-01 | End: 2024-10-02

## 2024-10-01 RX ORDER — SODIUM CHLORIDE, SODIUM LACTATE, POTASSIUM CHLORIDE, CALCIUM CHLORIDE 600; 310; 30; 20 MG/100ML; MG/100ML; MG/100ML; MG/100ML
INJECTION, SOLUTION INTRAVENOUS CONTINUOUS
Status: DISCONTINUED | OUTPATIENT
Start: 2024-10-01 | End: 2024-10-01

## 2024-10-01 RX ORDER — SENNOSIDES 8.8 MG/5ML
15 LIQUID ORAL NIGHTLY
Status: DISCONTINUED | OUTPATIENT
Start: 2024-10-01 | End: 2024-10-02 | Stop reason: HOSPADM

## 2024-10-01 RX ORDER — KETOROLAC TROMETHAMINE 15 MG/ML
15 INJECTION, SOLUTION INTRAMUSCULAR; INTRAVENOUS EVERY 6 HOURS PRN
Status: DISCONTINUED | OUTPATIENT
Start: 2024-10-01 | End: 2024-10-02 | Stop reason: HOSPADM

## 2024-10-01 RX ORDER — CALCIUM POLYCARBOPHIL 625 MG 625 MG/1
625 TABLET ORAL DAILY
Status: DISCONTINUED | OUTPATIENT
Start: 2024-10-01 | End: 2024-10-02 | Stop reason: HOSPADM

## 2024-10-01 RX ADMIN — PIPERACILLIN AND TAZOBACTAM 3375 MG: 3; .375 INJECTION, POWDER, LYOPHILIZED, FOR SOLUTION INTRAVENOUS at 13:17

## 2024-10-01 RX ADMIN — OXYCODONE HYDROCHLORIDE 10 MG: 5 TABLET ORAL at 05:57

## 2024-10-01 RX ADMIN — FAMOTIDINE 20 MG: 10 INJECTION, SOLUTION INTRAVENOUS at 08:51

## 2024-10-01 RX ADMIN — METOPROLOL TARTRATE 25 MG: 25 TABLET, FILM COATED ORAL at 08:51

## 2024-10-01 RX ADMIN — DULOXETINE HYDROCHLORIDE 90 MG: 30 CAPSULE, DELAYED RELEASE ORAL at 08:51

## 2024-10-01 RX ADMIN — KETOROLAC TROMETHAMINE 15 MG: 15 INJECTION, SOLUTION INTRAMUSCULAR; INTRAVENOUS at 16:05

## 2024-10-01 RX ADMIN — Medication 1000 UNITS: at 08:51

## 2024-10-01 RX ADMIN — MAGNESIUM HYDROXIDE 30 ML: 400 SUSPENSION ORAL at 16:24

## 2024-10-01 RX ADMIN — Medication 625 MG: at 14:51

## 2024-10-01 RX ADMIN — SODIUM CHLORIDE: 9 INJECTION, SOLUTION INTRAVENOUS at 14:51

## 2024-10-01 RX ADMIN — Medication 100 MG: at 08:51

## 2024-10-01 RX ADMIN — AMIODARONE HYDROCHLORIDE 200 MG: 200 TABLET ORAL at 08:51

## 2024-10-01 RX ADMIN — FAMOTIDINE 20 MG: 10 INJECTION, SOLUTION INTRAVENOUS at 20:00

## 2024-10-01 RX ADMIN — METOPROLOL TARTRATE 25 MG: 25 TABLET, FILM COATED ORAL at 20:01

## 2024-10-01 RX ADMIN — ENOXAPARIN SODIUM 40 MG: 100 INJECTION SUBCUTANEOUS at 08:50

## 2024-10-01 RX ADMIN — PIPERACILLIN AND TAZOBACTAM 3375 MG: 3; .375 INJECTION, POWDER, LYOPHILIZED, FOR SOLUTION INTRAVENOUS at 06:07

## 2024-10-01 RX ADMIN — PIPERACILLIN AND TAZOBACTAM 3375 MG: 3; .375 INJECTION, POWDER, LYOPHILIZED, FOR SOLUTION INTRAVENOUS at 18:33

## 2024-10-01 RX ADMIN — SODIUM CHLORIDE, SODIUM LACTATE, POTASSIUM CHLORIDE, AND CALCIUM CHLORIDE: 600; 310; 30; 20 INJECTION, SOLUTION INTRAVENOUS at 06:05

## 2024-10-01 RX ADMIN — SODIUM CHLORIDE: 9 INJECTION, SOLUTION INTRAVENOUS at 22:10

## 2024-10-01 RX ADMIN — PRAVASTATIN SODIUM 40 MG: 20 TABLET ORAL at 20:00

## 2024-10-01 RX ADMIN — Medication 1 TABLET: at 08:51

## 2024-10-01 ASSESSMENT — PAIN DESCRIPTION - ONSET: ONSET: ON-GOING

## 2024-10-01 ASSESSMENT — PAIN DESCRIPTION - DESCRIPTORS
DESCRIPTORS: SORE
DESCRIPTORS: STABBING

## 2024-10-01 ASSESSMENT — PAIN DESCRIPTION - LOCATION
LOCATION: ABDOMEN

## 2024-10-01 ASSESSMENT — PAIN SCALES - GENERAL
PAINLEVEL_OUTOF10: 1
PAINLEVEL_OUTOF10: 5
PAINLEVEL_OUTOF10: 1
PAINLEVEL_OUTOF10: 8
PAINLEVEL_OUTOF10: 7
PAINLEVEL_OUTOF10: 3

## 2024-10-01 ASSESSMENT — PAIN DESCRIPTION - FREQUENCY: FREQUENCY: CONTINUOUS

## 2024-10-01 ASSESSMENT — PAIN - FUNCTIONAL ASSESSMENT: PAIN_FUNCTIONAL_ASSESSMENT: ACTIVITIES ARE NOT PREVENTED

## 2024-10-01 ASSESSMENT — PAIN DESCRIPTION - PAIN TYPE: TYPE: SURGICAL PAIN

## 2024-10-01 NOTE — PROGRESS NOTES
Hospitalist Progress Note      Patient: Julien Avila MRN: [id number]  CSN: [id number]    YOB: 1945  Age: 78 y.o.  Sex: male    DOA: 9/30/2024 LOS:  LOS: 1 day            Chief complaint: RLQ pain    Assessment/Plan     Active Hospital Problems    Diagnosis Date Noted    Sepsis 2/2 appendicitis, s/p appendectomy w/ drain [A41.9] 09/30/2024     Priority: High    Acute appendicitis [K35.80] 09/30/2024     Priority: High    Obstructive sleep apnea [G47.33] 05/13/2024    Diverticulosis of colon [K57.30] 08/19/2015    Bipolar disorder (HCC) [F31.9] 05/26/2013    HTN (hypertension) [I10] 05/26/2013    Cerebrovascular disease [I67.9] 05/26/2013    Long term (current) use of anticoagulants [Z79.01]     Atrial fibrillation (HCC) [I48.91] 02/09/2011     Appendicitis, acute, resolving  Sepsis, source GI  CT a/p: acute appendicitis.  BlCx: NGTD.  ABX: rocephin and flagyl changed to piptazo, continue to overlap with surgery and drain.  CHAHAL kelsy-surgical score:  FRANCK risk of 0.6% of MI or cardiac arrest, intra-op or up to 30 days post-op.   Surgery: OR 9/30, drain placed for necrotic appendix.   Cx/path: appendix.  Pain control with Toradol or hydrocodone, Seems to be relatively contraindicate give pts night terrors after oxycodone and in past morphine.    (pt had concerning dreams on Morphine.   IVF NS 75cc x 24 hours.     PAF  Currently in SR at 78bpm w/o evidence of ischemia.  Xarelto: given 2 units Kcentra in ED.   Will restart Xarelto as to minimize interruption, paged surgery for recs, otherwise will start tomorrow, 10/2.     HTN, improved  Above goal, will continue and start home meds after surgery depending on post-surgical clearance.      HLD  C/W home statin after surgery.     Hx of DVT, LE below knee  On Xarelto for this and AF.     FC  DVDPPX: Xarelto reversed with K-centra in ED / SCDs.  Dispo: Anticipate discharge to home, possible on 10/2 if drain is pulled or will need HHC/SN orderred for

## 2024-10-01 NOTE — CARE COORDINATION
basic dialogue that supports the patient's individualized plan of care/goals, treatment preferences, and shares the quality data associated with the providers?  Yes     This CM met with the patient at the bedside to discuss DC planning. The patient was admited from home and states prior to admission she he was independent at home. The patient states he is able to get his medications without barriers and will have no CM needs. The patients plan is to DC to home when stable. Will continue to follow.

## 2024-10-01 NOTE — PLAN OF CARE
Problem: Pain  Goal: Verbalizes/displays adequate comfort level or baseline comfort level  Outcome: Progressing  Flowsheets (Taken 10/1/2024 1704)  Verbalizes/displays adequate comfort level or baseline comfort level: Assess pain using appropriate pain scale  Note: Medicated per MAR     Problem: Safety - Adult  Goal: Free from fall injury  Outcome: Progressing  Flowsheets (Taken 10/1/2024 1706)  Free From Fall Injury: Instruct family/caregiver on patient safety  Note: Bed low and locked, bed alarm on.      Problem: Discharge Planning  Goal: Discharge to home or other facility with appropriate resources  Outcome: Progressing  Flowsheets (Taken 10/1/2024 1706)  Discharge to home or other facility with appropriate resources: Arrange for needed discharge resources and transportation as appropriate

## 2024-10-01 NOTE — PROGRESS NOTES
09/30/24 2129   NIV Type   $NIV $Daily Charge   Ventilator ID [id number]   NIV Started/Stopped On   Equipment Type resmed   Mode CPAP   Mask Type Full face mask   Mask Size Medium   Bonnet size Medium

## 2024-10-02 VITALS
HEART RATE: 65 BPM | OXYGEN SATURATION: 93 % | RESPIRATION RATE: 18 BRPM | TEMPERATURE: 98.3 F | HEIGHT: 69 IN | SYSTOLIC BLOOD PRESSURE: 135 MMHG | DIASTOLIC BLOOD PRESSURE: 77 MMHG | WEIGHT: 229 LBS | BODY MASS INDEX: 33.92 KG/M2

## 2024-10-02 LAB
ANION GAP SERPL CALC-SCNC: 7 MMOL/L (ref 3–18)
BASOPHILS # BLD: 0 K/UL (ref 0–0.1)
BASOPHILS NFR BLD: 0 % (ref 0–2)
BILIRUB DIRECT SERPL-MCNC: 0.2 MG/DL (ref 0–0.2)
BILIRUB INDIRECT SERPL-MCNC: 0.5 MG/DL
BILIRUB SERPL-MCNC: 0.7 MG/DL (ref 0.2–1)
BUN SERPL-MCNC: 12 MG/DL (ref 7–18)
BUN/CREAT SERPL: 13 (ref 12–20)
CALCIUM SERPL-MCNC: 8.8 MG/DL (ref 8.5–10.1)
CHLORIDE SERPL-SCNC: 101 MMOL/L (ref 100–111)
CO2 SERPL-SCNC: 29 MMOL/L (ref 21–32)
CREAT SERPL-MCNC: 0.91 MG/DL (ref 0.6–1.3)
DIFFERENTIAL METHOD BLD: ABNORMAL
EOSINOPHIL # BLD: 0.1 K/UL (ref 0–0.4)
EOSINOPHIL NFR BLD: 1 % (ref 0–5)
ERYTHROCYTE [DISTWIDTH] IN BLOOD BY AUTOMATED COUNT: 13.1 % (ref 11.6–14.5)
GLUCOSE SERPL-MCNC: 104 MG/DL (ref 74–99)
HCT VFR BLD AUTO: 36.2 % (ref 36–48)
HGB BLD-MCNC: 12.2 G/DL (ref 13–16)
IMM GRANULOCYTES # BLD AUTO: 0 K/UL (ref 0–0.04)
IMM GRANULOCYTES NFR BLD AUTO: 0 % (ref 0–0.5)
LYMPHOCYTES # BLD: 1.4 K/UL (ref 0.9–3.6)
LYMPHOCYTES NFR BLD: 14 % (ref 21–52)
MAGNESIUM SERPL-MCNC: 2.5 MG/DL (ref 1.6–2.6)
MCH RBC QN AUTO: 31.1 PG (ref 24–34)
MCHC RBC AUTO-ENTMCNC: 33.7 G/DL (ref 31–37)
MCV RBC AUTO: 92.3 FL (ref 78–100)
MONOCYTES # BLD: 0.7 K/UL (ref 0.05–1.2)
MONOCYTES NFR BLD: 7 % (ref 3–10)
NEUTS SEG # BLD: 7.4 K/UL (ref 1.8–8)
NEUTS SEG NFR BLD: 77 % (ref 40–73)
NRBC # BLD: 0 K/UL (ref 0–0.01)
NRBC BLD-RTO: 0 PER 100 WBC
PLATELET # BLD AUTO: 225 K/UL (ref 135–420)
PMV BLD AUTO: 9.1 FL (ref 9.2–11.8)
POTASSIUM SERPL-SCNC: 3.3 MMOL/L (ref 3.5–5.5)
RBC # BLD AUTO: 3.92 M/UL (ref 4.35–5.65)
SODIUM SERPL-SCNC: 137 MMOL/L (ref 136–145)
WBC # BLD AUTO: 9.7 K/UL (ref 4.6–13.2)

## 2024-10-02 PROCEDURE — 36415 COLL VENOUS BLD VENIPUNCTURE: CPT

## 2024-10-02 PROCEDURE — 6370000000 HC RX 637 (ALT 250 FOR IP): Performed by: HOSPITALIST

## 2024-10-02 PROCEDURE — 82248 BILIRUBIN DIRECT: CPT

## 2024-10-02 PROCEDURE — 83735 ASSAY OF MAGNESIUM: CPT

## 2024-10-02 PROCEDURE — 82247 BILIRUBIN TOTAL: CPT

## 2024-10-02 PROCEDURE — 6370000000 HC RX 637 (ALT 250 FOR IP): Performed by: INTERNAL MEDICINE

## 2024-10-02 PROCEDURE — 6360000002 HC RX W HCPCS: Performed by: SURGERY

## 2024-10-02 PROCEDURE — 2500000003 HC RX 250 WO HCPCS: Performed by: INTERNAL MEDICINE

## 2024-10-02 PROCEDURE — 6360000002 HC RX W HCPCS: Performed by: INTERNAL MEDICINE

## 2024-10-02 PROCEDURE — 85025 COMPLETE CBC W/AUTO DIFF WBC: CPT

## 2024-10-02 PROCEDURE — 2580000003 HC RX 258: Performed by: INTERNAL MEDICINE

## 2024-10-02 PROCEDURE — 6370000000 HC RX 637 (ALT 250 FOR IP): Performed by: SURGERY

## 2024-10-02 PROCEDURE — 2580000003 HC RX 258: Performed by: SURGERY

## 2024-10-02 PROCEDURE — 80048 BASIC METABOLIC PNL TOTAL CA: CPT

## 2024-10-02 RX ORDER — POTASSIUM CHLORIDE 1500 MG/1
40 TABLET, EXTENDED RELEASE ORAL ONCE
Status: COMPLETED | OUTPATIENT
Start: 2024-10-02 | End: 2024-10-02

## 2024-10-02 RX ADMIN — DULOXETINE HYDROCHLORIDE 90 MG: 30 CAPSULE, DELAYED RELEASE ORAL at 08:43

## 2024-10-02 RX ADMIN — POTASSIUM CHLORIDE 40 MEQ: 1500 TABLET, EXTENDED RELEASE ORAL at 10:24

## 2024-10-02 RX ADMIN — ENOXAPARIN SODIUM 40 MG: 100 INJECTION SUBCUTANEOUS at 08:41

## 2024-10-02 RX ADMIN — MAGNESIUM HYDROXIDE 30 ML: 400 SUSPENSION ORAL at 08:42

## 2024-10-02 RX ADMIN — Medication 625 MG: at 10:23

## 2024-10-02 RX ADMIN — POTASSIUM CHLORIDE 40 MEQ: 1500 TABLET, EXTENDED RELEASE ORAL at 18:12

## 2024-10-02 RX ADMIN — PIPERACILLIN AND TAZOBACTAM 3375 MG: 3; .375 INJECTION, POWDER, LYOPHILIZED, FOR SOLUTION INTRAVENOUS at 13:13

## 2024-10-02 RX ADMIN — PIPERACILLIN AND TAZOBACTAM 3375 MG: 3; .375 INJECTION, POWDER, LYOPHILIZED, FOR SOLUTION INTRAVENOUS at 06:17

## 2024-10-02 RX ADMIN — AMIODARONE HYDROCHLORIDE 200 MG: 200 TABLET ORAL at 08:44

## 2024-10-02 RX ADMIN — Medication 100 MG: at 08:59

## 2024-10-02 RX ADMIN — PIPERACILLIN AND TAZOBACTAM 3375 MG: 3; .375 INJECTION, POWDER, LYOPHILIZED, FOR SOLUTION INTRAVENOUS at 00:27

## 2024-10-02 RX ADMIN — FAMOTIDINE 20 MG: 10 INJECTION, SOLUTION INTRAVENOUS at 08:41

## 2024-10-02 RX ADMIN — Medication 1000 UNITS: at 08:44

## 2024-10-02 RX ADMIN — Medication 1 TABLET: at 08:44

## 2024-10-02 RX ADMIN — ACETAMINOPHEN 650 MG: 325 TABLET ORAL at 17:37

## 2024-10-02 RX ADMIN — ACETAMINOPHEN 650 MG: 325 TABLET ORAL at 09:03

## 2024-10-02 ASSESSMENT — PAIN DESCRIPTION - LOCATION
LOCATION: ABDOMEN
LOCATION: ABDOMEN

## 2024-10-02 ASSESSMENT — PAIN SCALES - GENERAL
PAINLEVEL_OUTOF10: 1
PAINLEVEL_OUTOF10: 3
PAINLEVEL_OUTOF10: 8

## 2024-10-02 ASSESSMENT — PAIN DESCRIPTION - ORIENTATION: ORIENTATION: RIGHT

## 2024-10-02 ASSESSMENT — PAIN DESCRIPTION - DESCRIPTORS: DESCRIPTORS: STABBING

## 2024-10-02 NOTE — PROGRESS NOTES
Progress Note    Patient: Julien Avila MRN: [id number]  CSN: [id number]    YOB: 1945  Age: 78 y.o.  Sex: male    DOA: 9/30/2024 LOS:  LOS: 2 days                    Subjective:     Patient states feels okay..    Objective:      Visit Vitals  /77   Pulse 65   Temp 98.3 °F (36.8 °C) (Oral)   Resp 18   Ht 1.753 m (5' 9\")   Wt 103.9 kg (229 lb)   SpO2 93%   BMI 33.82 kg/m²       Physical Exam:  General appearance: Alert, no distress  Lungs: clear to auscultation bilaterally  Heart: regular rate and rhythm, S1, S2 normal, no murmur, click, rub or gallop  Abdomen: soft, appropriate tenderness, non distended  Extremities: extremities normal, atraumatic, no cyanosis or edema, calfs non-tender  Skin: Skin color, texture, turgor normal. No rashes or lesions  Psych: Alert, oriented x3, appropriate    Intake and Output:  Current Shift:  10/02 0701 - 10/02 1900  In: 2510.9 [P.O.:640; I.V.:1572.6]  Out: 825 [Urine:825]  Last three shifts:  09/30 1901 - 10/02 0700  In: 200 [I.V.:200]  Out: 1705 [Urine:1550; Drains:155]    Lab/Data Reviewed:      Medications Reviewed.    Assessment/Plan     Principal Problem:    Sepsis 2/2 appendicitis, s/p appendectomy w/ drain  Active Problems:    Acute appendicitis    Long term (current) use of anticoagulants    Bipolar disorder (HCC)    Diverticulosis of colon    Atrial fibrillation (HCC)    HTN (hypertension)    Cerebrovascular disease    Obstructive sleep apnea  Resolved Problems:    * No resolved hospital problems. *    Dr. Arcos had called into the nurse and wanted to remove the drain therefore this will be done.

## 2024-10-02 NOTE — DISCHARGE SUMMARY
Hospitalist Discharge Summary    Patient: Julien Avila MRN: [id number]  CSN: [id number]    YOB: 1945  Age: 78 y.o.  Sex: male    DOA: 9/30/2024 LOS:  LOS: 2 days   Discharge Date:      Primary Care Provider:  Mandy Hanson APRN - NP    Admission Diagnoses: Sepsis (HCC) [A41.9]    Discharge Diagnoses:    Active Hospital Problems    Diagnosis     Acute appendicitis [K35.80]      Priority: High    Sepsis 2/2 appendicitis, s/p appendectomy w/ drain [A41.9]     Obstructive sleep apnea [G47.33]     Diverticulosis of colon [K57.30]     Bipolar disorder (HCC) [F31.9]     HTN (hypertension) [I10]     Cerebrovascular disease [I67.9]     Long term (current) use of anticoagulants [Z79.01]     Atrial fibrillation ([facility]) [I48.91]        Discharge Medications:        Medication List        START taking these medications      amoxicillin-clavulanate 875-125 MG per tablet  Commonly known as: AUGMENTIN  Take 1 tablet by mouth 2 times daily for 7 days            CONTINUE taking these medications      amiodarone 200 MG tablet  Commonly known as: CORDARONE  Take 1 tablet by mouth daily     dilTIAZem 120 MG extended release capsule  Commonly known as: CARDIZEM CD  Take 1 capsule by mouth daily     DULoxetine 60 MG extended release capsule  Commonly known as: CYMBALTA     indapamide 2.5 MG tablet  Commonly known as: LOZOL     metoprolol tartrate 25 MG tablet  Commonly known as: LOPRESSOR     Mounjaro 2.5 MG/0.5ML Sopn SC injection  Generic drug: Tirzepatide     Oxygen Concentrator     potassium chloride 20 MEQ packet  Commonly known as: KLOR-CON     pravastatin 40 MG tablet  Commonly known as: PRAVACHOL     rivaroxaban 20 MG Tabs tablet  Commonly known as: XARELTO  Take 1 tablet by mouth daily     therapeutic multivitamin-minerals tablet  Take 1 tablet by mouth daily     vitamin B-1 100 MG

## 2024-10-02 NOTE — PLAN OF CARE
Problem: Pain  Goal: Verbalizes/displays adequate comfort level or baseline comfort level  10/1/2024 2013 by Nathalia Arellano RN  Outcome: Progressing  10/1/2024 1706 by Yael Cerda RN  Outcome: Progressing  Flowsheets (Taken 10/1/2024 1704)  Verbalizes/displays adequate comfort level or baseline comfort level: Assess pain using appropriate pain scale  Note: Medicated per MAR     Problem: Safety - Adult  Goal: Free from fall injury  10/1/2024 2013 by Nathalia Arellano RN  Outcome: Progressing  10/1/2024 1706 by Yael Cerda RN  Outcome: Progressing  Flowsheets (Taken 10/1/2024 1706)  Free From Fall Injury: Instruct family/caregiver on patient safety  Note: Bed low and locked, bed alarm on.      Problem: Discharge Planning  Goal: Discharge to home or other facility with appropriate resources  10/1/2024 2013 by Nathalia Arellano RN  Outcome: Progressing  10/1/2024 1706 by Yael Cerda RN  Outcome: Progressing  Flowsheets (Taken 10/1/2024 1706)  Discharge to home or other facility with appropriate resources: Arrange for needed discharge resources and transportation as appropriate

## 2024-10-02 NOTE — PROGRESS NOTES
10/01/24 2016   NIV Type   $NIV $Daily Charge   Ventilator ID [id number]   NIV Started/Stopped On   Equipment Type resmed   Mode CPAP   Mask Type Full face mask   Mask Size Medium   Bonnet size Medium

## 2024-10-02 NOTE — PLAN OF CARE
Problem: Pain  Goal: Verbalizes/displays adequate comfort level or baseline comfort level  10/2/2024 1010 by Debra Busby RN  Outcome: Progressing  10/1/2024 2013 by Nathalia Arellano, RN  Outcome: Progressing     Problem: Safety - Adult  Goal: Free from fall injury  10/2/2024 1010 by Debra Busby RN  Outcome: Progressing  10/1/2024 2013 by Nathalia Arellano, RN  Outcome: Progressing     Problem: Discharge Planning  Goal: Discharge to home or other facility with appropriate resources  10/2/2024 1010 by Debra Busby, RN  Outcome: Progressing  Flowsheets (Taken 10/2/2024 0826)  Discharge to home or other facility with appropriate resources:   Identify barriers to discharge with patient and caregiver   Arrange for needed discharge resources and transportation as appropriate  10/1/2024 2013 by Nathalia Arellano, RN  Outcome: Progressing

## 2024-10-02 NOTE — PROGRESS NOTES
Surgery Post Operative Day Note    Julien Avila    Admit Date: 9/30/2024  Surgery Date:  9/30/2024  Surgery: Procedure(s):  APPENDECTOMY LAPAROSCOPIC  POD: 1 Day Post-Op  Preoperative Diagnosis: Pre-Op Diagnosis Codes:      * Acute appendicitis [K35.80]  Postoperative Diagnosis: Post-Op Diagnosis Codes:     * Acute appendicitis [K35.80]      Subjective:      Patient present conditions: No significant medical complaints    Objective:     Patient Vitals for the past 24 hrs:   BP Temp Temp src Pulse Resp SpO2   10/01/24 1958 119/65 -- -- 63 -- --   10/01/24 1904 132/71 97.9 °F (36.6 °C) Oral 58 20 95 %   10/01/24 1525 120/79 97 °F (36.1 °C) Oral 57 22 99 %   10/01/24 1300 -- -- -- 59 -- --   10/01/24 1155 118/65 97.6 °F (36.4 °C) Oral 58 22 97 %   10/01/24 0900 -- -- -- 62 -- --   10/01/24 0708 130/74 98.3 °F (36.8 °C) Oral 65 18 94 %   10/01/24 0627 -- -- -- -- 18 --   10/01/24 0512 111/72 97.3 °F (36.3 °C) Temporal 77 16 94 %   10/01/24 0025 (!) 105/93 97.1 °F (36.2 °C) Temporal 71 16 92 %   10/01/24 0010 -- -- -- -- 18 --      09/30 0701 - 10/01 1900  In: 1750 [I.V.:1700]  Out: 1660 [Urine:1550; Drains:110]  Recent Results (from the past 24 hour(s))   Basic Metabolic Panel w/ Reflex to MG    Collection Time: 10/01/24  5:44 AM   Result Value Ref Range    Sodium 135 (L) 136 - 145 mmol/L    Potassium 3.8 3.5 - 5.5 mmol/L    Chloride 102 100 - 111 mmol/L    CO2 26 21 - 32 mmol/L    Anion Gap 7 3.0 - 18 mmol/L    Glucose 135 (H) 74 - 99 mg/dL    BUN 10 7.0 - 18 MG/DL    Creatinine 0.88 0.6 - 1.3 MG/DL    BUN/Creatinine Ratio 11 (L) 12 - 20      Est, Glom Filt Rate 89 >60 ml/min/1.73m2    Calcium 8.5 8.5 - 10.1 MG/DL   CBC with Auto Differential    Collection Time: 10/01/24  5:44 AM   Result Value Ref Range    WBC 16.2 (H) 4.6 - 13.2 K/uL    RBC 4.36 4.35 - 5.65 M/uL    Hemoglobin 13.7 13.0 - 16.0 g/dL    Hematocrit 40.2 36.0 - 48.0 %    MCV 92.2 78.0 - 100.0 FL    MCH 31.4 24.0 - 34.0 PG    MCHC 34.1 31.0 - 37.0 g/dL

## 2024-10-06 LAB
BACTERIA SPEC CULT: NORMAL
BACTERIA SPEC CULT: NORMAL
SERVICE CMNT-IMP: NORMAL
SERVICE CMNT-IMP: NORMAL

## 2024-11-06 NOTE — PROGRESS NOTES
0235- Arrived from ED with NBR mask in place at 8 liters, accessory muscle use noted/increased work of breathing. Assessment completed. RT updated.    0412- Noted to be in atrial fibrillation, Dr Galvin notified, orders received for an EKG.  in to see.    0508- Placed on high flow nasal cannula per RT.    0530- Reports work of breathing feels much improved with high flow nasal cannula in place.    Pre-procedure checklist reviewed.

## 2024-12-30 ENCOUNTER — HOSPITAL ENCOUNTER (EMERGENCY)
Facility: HOSPITAL | Age: 79
Discharge: HOME | End: 2024-12-30
Attending: EMERGENCY MEDICINE
Payer: MEDICARE

## 2024-12-30 VITALS
SYSTOLIC BLOOD PRESSURE: 115 MMHG | HEART RATE: 56 BPM | RESPIRATION RATE: 20 BRPM | BODY MASS INDEX: 33.82 KG/M2 | TEMPERATURE: 98.2 F | DIASTOLIC BLOOD PRESSURE: 63 MMHG | HEIGHT: 69 IN | OXYGEN SATURATION: 92 %

## 2024-12-30 DIAGNOSIS — E87.6 ACUTE HYPOKALEMIA: ICD-10-CM

## 2024-12-30 DIAGNOSIS — R00.0 RACING HEART BEAT: Primary | ICD-10-CM

## 2024-12-30 DIAGNOSIS — R79.89 ELEVATED TSH: ICD-10-CM

## 2024-12-30 LAB
ABO + RH BLD: NORMAL
ALBUMIN SERPL-MCNC: 3.4 G/DL (ref 3.4–5)
ALBUMIN/GLOB SERPL: 1.3 (ref 0.8–1.7)
ALP SERPL-CCNC: 75 U/L (ref 45–117)
ALT SERPL-CCNC: 19 U/L (ref 16–61)
ANION GAP SERPL CALC-SCNC: 7 MMOL/L (ref 3–18)
AST SERPL-CCNC: 14 U/L (ref 10–38)
BASOPHILS # BLD: 0.1 K/UL (ref 0–0.1)
BASOPHILS NFR BLD: 1 % (ref 0–2)
BILIRUB SERPL-MCNC: 0.4 MG/DL (ref 0.2–1)
BUN SERPL-MCNC: 24 MG/DL (ref 7–18)
BUN/CREAT SERPL: 20 (ref 12–20)
CALCIUM SERPL-MCNC: 9 MG/DL (ref 8.5–10.1)
CHLORIDE SERPL-SCNC: 105 MMOL/L (ref 100–111)
CO2 SERPL-SCNC: 27 MMOL/L (ref 21–32)
CREAT SERPL-MCNC: 1.21 MG/DL (ref 0.6–1.3)
DIFFERENTIAL METHOD BLD: ABNORMAL
EOSINOPHIL # BLD: 0.3 K/UL (ref 0–0.4)
EOSINOPHIL NFR BLD: 3 % (ref 0–5)
ERYTHROCYTE [DISTWIDTH] IN BLOOD BY AUTOMATED COUNT: 13.4 % (ref 11.6–14.5)
GLOBULIN SER CALC-MCNC: 2.7 G/DL (ref 2–4)
GLUCOSE SERPL-MCNC: 141 MG/DL (ref 74–99)
HCT VFR BLD AUTO: 40.5 % (ref 36–48)
HGB BLD-MCNC: 14 G/DL (ref 13–16)
IMM GRANULOCYTES # BLD AUTO: 0.1 K/UL (ref 0–0.04)
IMM GRANULOCYTES NFR BLD AUTO: 1 % (ref 0–0.5)
LYMPHOCYTES # BLD: 1.1 K/UL (ref 0.9–3.6)
LYMPHOCYTES NFR BLD: 12 % (ref 21–52)
MAGNESIUM SERPL-MCNC: 2.2 MG/DL (ref 1.6–2.6)
MCH RBC QN AUTO: 32.8 PG (ref 24–34)
MCHC RBC AUTO-ENTMCNC: 34.6 G/DL (ref 31–37)
MCV RBC AUTO: 94.8 FL (ref 78–100)
MONOCYTES # BLD: 0.8 K/UL (ref 0.05–1.2)
MONOCYTES NFR BLD: 9 % (ref 3–10)
NEUTS SEG # BLD: 6.4 K/UL (ref 1.8–8)
NEUTS SEG NFR BLD: 74 % (ref 40–73)
NRBC # BLD: 0.02 K/UL (ref 0–0.01)
NRBC BLD-RTO: 0.2 PER 100 WBC
PLATELET # BLD AUTO: 225 K/UL (ref 135–420)
PMV BLD AUTO: 9 FL (ref 9.2–11.8)
POTASSIUM SERPL-SCNC: 3.2 MMOL/L (ref 3.5–5.5)
PROT SERPL-MCNC: 6.1 G/DL (ref 6.4–8.2)
RBC # BLD AUTO: 4.27 M/UL (ref 4.35–5.65)
SODIUM SERPL-SCNC: 139 MMOL/L (ref 136–145)
T3FREE SERPL-MCNC: 2.3 PG/ML (ref 2.18–3.98)
T4 FREE SERPL-MCNC: 1 NG/DL (ref 0.7–1.5)
TROPONIN I SERPL HS-MCNC: 14 NG/L (ref 0–78)
TSH SERPL DL<=0.05 MIU/L-ACNC: 4.37 UIU/ML (ref 0.36–3.74)
WBC # BLD AUTO: 8.6 K/UL (ref 4.6–13.2)

## 2024-12-30 PROCEDURE — 99284 EMERGENCY DEPT VISIT MOD MDM: CPT

## 2024-12-30 PROCEDURE — 84439 ASSAY OF FREE THYROXINE: CPT

## 2024-12-30 PROCEDURE — 84481 FREE ASSAY (FT-3): CPT

## 2024-12-30 PROCEDURE — 84443 ASSAY THYROID STIM HORMONE: CPT

## 2024-12-30 PROCEDURE — 86901 BLOOD TYPING SEROLOGIC RH(D): CPT

## 2024-12-30 PROCEDURE — 83735 ASSAY OF MAGNESIUM: CPT

## 2024-12-30 PROCEDURE — 84484 ASSAY OF TROPONIN QUANT: CPT

## 2024-12-30 PROCEDURE — 80053 COMPREHEN METABOLIC PANEL: CPT

## 2024-12-30 PROCEDURE — 86900 BLOOD TYPING SEROLOGIC ABO: CPT

## 2024-12-30 PROCEDURE — 85025 COMPLETE CBC W/AUTO DIFF WBC: CPT

## 2024-12-30 RX ORDER — POTASSIUM CHLORIDE 1500 MG/1
40 TABLET, EXTENDED RELEASE ORAL
Status: DISCONTINUED | OUTPATIENT
Start: 2024-12-30 | End: 2024-12-30

## 2024-12-30 RX ORDER — METOPROLOL SUCCINATE 50 MG/1
50 TABLET, EXTENDED RELEASE ORAL DAILY
Qty: 30 TABLET | Refills: 0 | Status: SHIPPED | OUTPATIENT
Start: 2024-12-30

## 2024-12-30 ASSESSMENT — PAIN - FUNCTIONAL ASSESSMENT: PAIN_FUNCTIONAL_ASSESSMENT: 0-10

## 2024-12-30 ASSESSMENT — PAIN SCALES - GENERAL: PAINLEVEL_OUTOF10: 0

## 2024-12-30 NOTE — ED TRIAGE NOTES
Pt arrives ambulatory to triage. Pt reports 3 episodes of a , pt reports he felt and noticed it racing tonight at 2230. Pt's HR is 73 in triage. Pt denies SOB with palpitations. Pt reports taking 2 doses of metoprolol PTA, as directed by doctor.

## 2024-12-30 NOTE — DISCHARGE INSTRUCTIONS
With take Lopressor 25 mg tablet once in the morning once in the evening.  Alternatively, can try the long-acting Toprol-XL I have prescribed if this is not effective.  Would hold Lopressor if heart rate is less than 60.  We sent off thyroid hormone levels as your TSH level is still elevated suggesting possible hypothyroidism.  This would not make sense with intermittent episodes of heart racing as it would typically be associated with hyperthyroidism.  This can be followed up and trended by her primary care provider.

## 2024-12-30 NOTE — ED PROVIDER NOTES
GREG.  Emergency Physician   [facility]            Robin Nelson DO  12/30/24 0575

## 2025-01-04 LAB
EKG ATRIAL RATE: 74 BPM
EKG DIAGNOSIS: NORMAL
EKG P AXIS: 39 DEGREES
EKG P-R INTERVAL: 192 MS
EKG Q-T INTERVAL: 440 MS
EKG QRS DURATION: 88 MS
EKG QTC CALCULATION (BAZETT): 488 MS
EKG R AXIS: -9 DEGREES
EKG T AXIS: 37 DEGREES
EKG VENTRICULAR RATE: 74 BPM

## 2025-01-09 ENCOUNTER — HOSPITAL ENCOUNTER (OUTPATIENT)
Facility: HOSPITAL | Age: 80
Discharge: HOME | End: 2025-01-12
Payer: MEDICARE

## 2025-01-09 ENCOUNTER — TRANSCRIBE ORDERS (OUTPATIENT)
Facility: HOSPITAL | Age: 80
End: 2025-01-09

## 2025-01-09 DIAGNOSIS — I25.119 ATHEROSCLEROSIS OF NATIVE CORONARY ARTERY WITH ANGINA PECTORIS, UNSPECIFIED WHETHER NATIVE OR TRANSPLANTED HEART (HCC): ICD-10-CM

## 2025-01-09 DIAGNOSIS — I48.0 PAROXYSMAL ATRIAL FIBRILLATION (HCC): Primary | ICD-10-CM

## 2025-01-09 DIAGNOSIS — I48.0 PAROXYSMAL ATRIAL FIBRILLATION (HCC): ICD-10-CM

## 2025-01-09 LAB
ANION GAP SERPL CALC-SCNC: 4 MMOL/L (ref 3–18)
BUN SERPL-MCNC: 25 MG/DL (ref 7–18)
BUN/CREAT SERPL: 20 (ref 12–20)
CALCIUM SERPL-MCNC: 9.4 MG/DL (ref 8.5–10.1)
CHLORIDE SERPL-SCNC: 105 MMOL/L (ref 100–111)
CO2 SERPL-SCNC: 29 MMOL/L (ref 21–32)
CREAT SERPL-MCNC: 1.23 MG/DL (ref 0.6–1.3)
GLUCOSE SERPL-MCNC: 106 MG/DL (ref 74–99)
MAGNESIUM SERPL-MCNC: 2.2 MG/DL (ref 1.6–2.6)
POTASSIUM SERPL-SCNC: 4.2 MMOL/L (ref 3.5–5.5)
SODIUM SERPL-SCNC: 138 MMOL/L (ref 136–145)

## 2025-01-09 PROCEDURE — 83735 ASSAY OF MAGNESIUM: CPT

## 2025-01-09 PROCEDURE — 36415 COLL VENOUS BLD VENIPUNCTURE: CPT

## 2025-01-09 PROCEDURE — 80048 BASIC METABOLIC PNL TOTAL CA: CPT

## 2025-01-10 LAB
FAX TO NUMBER: NORMAL
TEST RESULTS FAXED TO: NORMAL

## 2025-03-24 ENCOUNTER — HOSPITAL ENCOUNTER (EMERGENCY)
Facility: HOSPITAL | Age: 80
Discharge: HOME | End: 2025-03-24
Attending: STUDENT IN AN ORGANIZED HEALTH CARE EDUCATION/TRAINING PROGRAM
Payer: MEDICARE

## 2025-03-24 ENCOUNTER — APPOINTMENT (OUTPATIENT)
Facility: HOSPITAL | Age: 80
End: 2025-03-24
Payer: MEDICARE

## 2025-03-24 VITALS
HEIGHT: 69 IN | SYSTOLIC BLOOD PRESSURE: 143 MMHG | TEMPERATURE: 97.8 F | BODY MASS INDEX: 34.8 KG/M2 | HEART RATE: 62 BPM | WEIGHT: 235 LBS | DIASTOLIC BLOOD PRESSURE: 78 MMHG | RESPIRATION RATE: 14 BRPM | OXYGEN SATURATION: 96 %

## 2025-03-24 DIAGNOSIS — R00.2 PALPITATIONS: Primary | ICD-10-CM

## 2025-03-24 DIAGNOSIS — J90 PLEURAL EFFUSION: ICD-10-CM

## 2025-03-24 LAB
ALBUMIN SERPL-MCNC: 3.8 G/DL (ref 3.4–5)
ALBUMIN/GLOB SERPL: 1.4 (ref 0.8–1.7)
ALP SERPL-CCNC: 71 U/L (ref 45–117)
ALT SERPL-CCNC: 19 U/L (ref 16–61)
ANION GAP SERPL CALC-SCNC: 8 MMOL/L (ref 3–18)
AST SERPL-CCNC: 19 U/L (ref 10–38)
BASOPHILS # BLD: 0.1 K/UL (ref 0–0.1)
BASOPHILS NFR BLD: 1.3 % (ref 0–2)
BILIRUB SERPL-MCNC: 0.9 MG/DL (ref 0.2–1)
BUN SERPL-MCNC: 23 MG/DL (ref 7–18)
BUN/CREAT SERPL: 21 (ref 12–20)
CALCIUM SERPL-MCNC: 9.6 MG/DL (ref 8.5–10.1)
CHLORIDE SERPL-SCNC: 102 MMOL/L (ref 100–111)
CO2 SERPL-SCNC: 28 MMOL/L (ref 21–32)
CREAT SERPL-MCNC: 1.12 MG/DL (ref 0.6–1.3)
DIFFERENTIAL METHOD BLD: NORMAL
EOSINOPHIL # BLD: 0.29 K/UL (ref 0–0.4)
EOSINOPHIL NFR BLD: 3.9 % (ref 0–5)
ERYTHROCYTE [DISTWIDTH] IN BLOOD BY AUTOMATED COUNT: 12.5 % (ref 11.6–14.5)
GLOBULIN SER CALC-MCNC: 2.8 G/DL (ref 2–4)
GLUCOSE SERPL-MCNC: 148 MG/DL (ref 74–99)
HCT VFR BLD AUTO: 46.1 % (ref 36–48)
HGB BLD-MCNC: 15.6 G/DL (ref 13–16)
IMM GRANULOCYTES # BLD AUTO: 0.02 K/UL (ref 0–0.04)
IMM GRANULOCYTES NFR BLD AUTO: 0.3 % (ref 0–0.5)
LYMPHOCYTES # BLD: 1.95 K/UL (ref 0.9–3.3)
LYMPHOCYTES NFR BLD: 26.2 % (ref 21–52)
MAGNESIUM SERPL-MCNC: 2.2 MG/DL (ref 1.6–2.6)
MCH RBC QN AUTO: 31.6 PG (ref 24–34)
MCHC RBC AUTO-ENTMCNC: 33.8 G/DL (ref 31–37)
MCV RBC AUTO: 93.3 FL (ref 78–100)
MONOCYTES # BLD: 0.52 K/UL (ref 0.05–1.2)
MONOCYTES NFR BLD: 7 % (ref 3–10)
NEUTS SEG # BLD: 4.56 K/UL (ref 1.8–8)
NEUTS SEG NFR BLD: 61.3 % (ref 40–73)
NRBC # BLD: 0 K/UL (ref 0–0.01)
NRBC BLD-RTO: 0 PER 100 WBC
NT PRO BNP: 219 PG/ML (ref 0–1800)
PLATELET # BLD AUTO: 271 K/UL (ref 135–420)
PMV BLD AUTO: 9.6 FL (ref 9.2–11.8)
POTASSIUM SERPL-SCNC: 3.6 MMOL/L (ref 3.5–5.5)
PROT SERPL-MCNC: 6.6 G/DL (ref 6.4–8.2)
RBC # BLD AUTO: 4.94 M/UL (ref 4.35–5.65)
SODIUM SERPL-SCNC: 138 MMOL/L (ref 136–145)
TROPONIN I SERPL HS-MCNC: 12 NG/L (ref 0–78)
WBC # BLD AUTO: 7.4 K/UL (ref 4.6–13.2)

## 2025-03-24 PROCEDURE — 99285 EMERGENCY DEPT VISIT HI MDM: CPT

## 2025-03-24 PROCEDURE — 83880 ASSAY OF NATRIURETIC PEPTIDE: CPT

## 2025-03-24 PROCEDURE — 96374 THER/PROPH/DIAG INJ IV PUSH: CPT

## 2025-03-24 PROCEDURE — 6360000002 HC RX W HCPCS: Performed by: STUDENT IN AN ORGANIZED HEALTH CARE EDUCATION/TRAINING PROGRAM

## 2025-03-24 PROCEDURE — 71045 X-RAY EXAM CHEST 1 VIEW: CPT

## 2025-03-24 PROCEDURE — 80053 COMPREHEN METABOLIC PANEL: CPT

## 2025-03-24 PROCEDURE — 93005 ELECTROCARDIOGRAM TRACING: CPT | Performed by: STUDENT IN AN ORGANIZED HEALTH CARE EDUCATION/TRAINING PROGRAM

## 2025-03-24 PROCEDURE — 84484 ASSAY OF TROPONIN QUANT: CPT

## 2025-03-24 PROCEDURE — 83735 ASSAY OF MAGNESIUM: CPT

## 2025-03-24 PROCEDURE — 85025 COMPLETE CBC W/AUTO DIFF WBC: CPT

## 2025-03-24 RX ORDER — FUROSEMIDE 10 MG/ML
20 INJECTION INTRAMUSCULAR; INTRAVENOUS
Status: COMPLETED | OUTPATIENT
Start: 2025-03-24 | End: 2025-03-24

## 2025-03-24 RX ADMIN — FUROSEMIDE 20 MG: 10 INJECTION, SOLUTION INTRAVENOUS at 11:09

## 2025-03-24 ASSESSMENT — ENCOUNTER SYMPTOMS
NAUSEA: 0
SHORTNESS OF BREATH: 1
CHEST TIGHTNESS: 0
DIARRHEA: 0
SORE THROAT: 0
COUGH: 0
CHOKING: 0
WHEEZING: 0
VOMITING: 0
ABDOMINAL PAIN: 0
STRIDOR: 0

## 2025-03-24 ASSESSMENT — PAIN - FUNCTIONAL ASSESSMENT: PAIN_FUNCTIONAL_ASSESSMENT: NONE - DENIES PAIN

## 2025-03-24 NOTE — DISCHARGE INSTRUCTIONS
Please make a follow-up appointment with the cardiologist as soon as possible regarding your emergency department visit today to make adjustments to your medication and for further testing of your heart.  Return to the emergency department for any new or worsening symptoms.

## 2025-03-24 NOTE — ED PROVIDER NOTES
EMERGENCY DEPARTMENT HISTORY AND PHYSICAL EXAM      Date: 3/24/2025  Patient Name: Julien Avila    History of Presenting Illness     Chief Complaint   Patient presents with    Tachycardia    Shortness of Breath       79-year-old male with history of hypertension, atrial fibrillation on metoprolol presenting to the emergency department for evaluation of shortness of breath and palpitations.  Patient reports has been sensation of heart racing last night associate with some shortness of breath.  Patient took a half dose of metoprolol in the middle of the night which did improve his symptoms.  States that he is noticing that he is having to take increased doses of his metoprolol.  He tried to see his cardiologist but was unable to get an appointment.  Patient also reporting some dyspnea upon exertion that is new.  He denies any chest pain.          PCP: Mandy Hanson APRN - NP    No current facility-administered medications for this encounter.     Current Outpatient Medications   Medication Sig Dispense Refill    metoprolol succinate (TOPROL XL) 50 MG extended release tablet Take 1 tablet by mouth daily 30 tablet 0    Tirzepatide (MOUNJARO) 2.5 MG/0.5ML SOPN SC injection Inject 0.5 mLs into the skin once a week      metoprolol tartrate (LOPRESSOR) 25 MG tablet Take 1 tablet by mouth 2 times daily      dilTIAZem (CARDIZEM CD) 120 MG extended release capsule Take 1 capsule by mouth daily 30 capsule 0    potassium chloride (KLOR-CON) 20 MEQ packet Take 20 mEq by mouth daily      indapamide (LOZOL) 2.5 MG tablet Take 1 tablet by mouth daily      Oxygen Concentrator 2 L/min by Nasal route continuous prn (SOB). (Patient not taking: Reported on 9/30/2024)      amiodarone (CORDARONE) 200 MG tablet Take 1 tablet by mouth daily 30 tablet 0    vitamin B-1 (THIAMINE) 100 MG tablet Take 1 tablet by mouth daily 30 tablet 0    rivaroxaban (XARELTO) 20 MG TABS tablet Take 1 tablet by mouth daily 30 tablet 1    Multiple

## 2025-03-25 ENCOUNTER — TRANSCRIBE ORDERS (OUTPATIENT)
Facility: HOSPITAL | Age: 80
End: 2025-03-25

## 2025-03-25 ENCOUNTER — HOSPITAL ENCOUNTER (OUTPATIENT)
Facility: HOSPITAL | Age: 80
Discharge: HOME | End: 2025-03-28
Payer: MEDICARE

## 2025-03-25 DIAGNOSIS — I48.91 ATRIAL FIBRILLATION, UNSPECIFIED TYPE (HCC): Primary | ICD-10-CM

## 2025-03-25 DIAGNOSIS — I48.91 ATRIAL FIBRILLATION, UNSPECIFIED TYPE (HCC): ICD-10-CM

## 2025-03-25 LAB
T4 FREE SERPL-MCNC: 1 NG/DL (ref 0.7–1.5)
TSH SERPL DL<=0.05 MIU/L-ACNC: 6.64 UIU/ML (ref 0.36–3.74)

## 2025-03-25 PROCEDURE — 84480 ASSAY TRIIODOTHYRONINE (T3): CPT

## 2025-03-25 PROCEDURE — 84443 ASSAY THYROID STIM HORMONE: CPT

## 2025-03-25 PROCEDURE — 84439 ASSAY OF FREE THYROXINE: CPT

## 2025-03-25 PROCEDURE — 36415 COLL VENOUS BLD VENIPUNCTURE: CPT

## 2025-03-26 LAB
EKG ATRIAL RATE: 61 BPM
EKG DIAGNOSIS: NORMAL
EKG P AXIS: 36 DEGREES
EKG P-R INTERVAL: 200 MS
EKG Q-T INTERVAL: 318 MS
EKG QRS DURATION: 94 MS
EKG QTC CALCULATION (BAZETT): 320 MS
EKG R AXIS: 2 DEGREES
EKG T AXIS: 61 DEGREES
EKG VENTRICULAR RATE: 61 BPM
T3 SERPL-MCNC: 94 NG/DL (ref 71–180)

## 2025-03-27 LAB
FAX TO NUMBER: NORMAL
TEST RESULTS FAXED TO: NORMAL

## 2025-04-09 ENCOUNTER — TRANSCRIBE ORDERS (OUTPATIENT)
Facility: HOSPITAL | Age: 80
End: 2025-04-09

## 2025-04-09 DIAGNOSIS — I48.0 PAROXYSMAL ATRIAL FIBRILLATION (HCC): Primary | ICD-10-CM

## 2025-04-15 ENCOUNTER — APPOINTMENT (OUTPATIENT)
Facility: HOSPITAL | Age: 80
End: 2025-04-15
Payer: MEDICARE

## 2025-04-15 ENCOUNTER — HOSPITAL ENCOUNTER (EMERGENCY)
Facility: HOSPITAL | Age: 80
Discharge: HOME | End: 2025-04-16
Payer: MEDICARE

## 2025-04-15 VITALS
TEMPERATURE: 98.4 F | HEIGHT: 69 IN | RESPIRATION RATE: 18 BRPM | OXYGEN SATURATION: 93 % | DIASTOLIC BLOOD PRESSURE: 75 MMHG | HEART RATE: 98 BPM | BODY MASS INDEX: 34.8 KG/M2 | WEIGHT: 235 LBS | SYSTOLIC BLOOD PRESSURE: 144 MMHG

## 2025-04-15 DIAGNOSIS — R06.02 SHORTNESS OF BREATH: Primary | ICD-10-CM

## 2025-04-15 DIAGNOSIS — R06.6 HICCUPS: ICD-10-CM

## 2025-04-15 LAB
ALBUMIN SERPL-MCNC: 4.2 G/DL (ref 3.4–5)
ALBUMIN/GLOB SERPL: 1.1 (ref 0.8–1.7)
ALP SERPL-CCNC: 84 U/L (ref 45–117)
ALT SERPL-CCNC: 25 U/L (ref 16–61)
ANION GAP SERPL CALC-SCNC: 7 MMOL/L (ref 3–18)
AST SERPL-CCNC: 15 U/L (ref 10–38)
BASOPHILS # BLD: 0.08 K/UL (ref 0–0.1)
BASOPHILS NFR BLD: 0.6 % (ref 0–2)
BILIRUB SERPL-MCNC: 0.6 MG/DL (ref 0.2–1)
BUN SERPL-MCNC: 24 MG/DL (ref 7–18)
BUN/CREAT SERPL: 19 (ref 12–20)
CALCIUM SERPL-MCNC: 9.6 MG/DL (ref 8.5–10.1)
CHLORIDE SERPL-SCNC: 98 MMOL/L (ref 100–111)
CO2 SERPL-SCNC: 30 MMOL/L (ref 21–32)
CREAT SERPL-MCNC: 1.27 MG/DL (ref 0.6–1.3)
D DIMER PPP FEU-MCNC: 1.21 UG/ML(FEU)
DIFFERENTIAL METHOD BLD: ABNORMAL
EOSINOPHIL # BLD: 0.37 K/UL (ref 0–0.4)
EOSINOPHIL NFR BLD: 2.9 % (ref 0–5)
ERYTHROCYTE [DISTWIDTH] IN BLOOD BY AUTOMATED COUNT: 12.4 % (ref 11.6–14.5)
GLOBULIN SER CALC-MCNC: 3.7 G/DL (ref 2–4)
GLUCOSE SERPL-MCNC: 93 MG/DL (ref 74–99)
HCT VFR BLD AUTO: 54.2 % (ref 36–48)
HGB BLD-MCNC: 18.2 G/DL (ref 13–16)
IMM GRANULOCYTES # BLD AUTO: 0.08 K/UL (ref 0–0.04)
IMM GRANULOCYTES NFR BLD AUTO: 0.6 % (ref 0–0.5)
LYMPHOCYTES # BLD: 3.03 K/UL (ref 0.9–3.3)
LYMPHOCYTES NFR BLD: 23.7 % (ref 21–52)
MAGNESIUM SERPL-MCNC: 2.3 MG/DL (ref 1.6–2.6)
MCH RBC QN AUTO: 30.3 PG (ref 24–34)
MCHC RBC AUTO-ENTMCNC: 33.6 G/DL (ref 31–37)
MCV RBC AUTO: 90.3 FL (ref 78–100)
MONOCYTES # BLD: 0.84 K/UL (ref 0.05–1.2)
MONOCYTES NFR BLD: 6.6 % (ref 3–10)
NEUTS SEG # BLD: 8.39 K/UL (ref 1.8–8)
NEUTS SEG NFR BLD: 65.6 % (ref 40–73)
NRBC # BLD: 0 K/UL (ref 0–0.01)
NRBC BLD-RTO: 0 PER 100 WBC
NT PRO BNP: 77 PG/ML (ref 0–1800)
PLATELET # BLD AUTO: 359 K/UL (ref 135–420)
PMV BLD AUTO: 9 FL (ref 9.2–11.8)
POTASSIUM SERPL-SCNC: 3.4 MMOL/L (ref 3.5–5.5)
PROT SERPL-MCNC: 7.9 G/DL (ref 6.4–8.2)
RBC # BLD AUTO: 6 M/UL (ref 4.35–5.65)
SODIUM SERPL-SCNC: 135 MMOL/L (ref 136–145)
TROPONIN I SERPL HS-MCNC: 14 NG/L (ref 0–78)
TROPONIN I SERPL HS-MCNC: 15 NG/L (ref 0–78)
WBC # BLD AUTO: 12.8 K/UL (ref 4.6–13.2)

## 2025-04-15 PROCEDURE — 85025 COMPLETE CBC W/AUTO DIFF WBC: CPT

## 2025-04-15 PROCEDURE — 71046 X-RAY EXAM CHEST 2 VIEWS: CPT

## 2025-04-15 PROCEDURE — 2580000003 HC RX 258: Performed by: PHYSICIAN ASSISTANT

## 2025-04-15 PROCEDURE — 80053 COMPREHEN METABOLIC PANEL: CPT

## 2025-04-15 PROCEDURE — 6370000000 HC RX 637 (ALT 250 FOR IP): Performed by: PHYSICIAN ASSISTANT

## 2025-04-15 PROCEDURE — 99285 EMERGENCY DEPT VISIT HI MDM: CPT

## 2025-04-15 PROCEDURE — 84484 ASSAY OF TROPONIN QUANT: CPT

## 2025-04-15 PROCEDURE — 83735 ASSAY OF MAGNESIUM: CPT

## 2025-04-15 PROCEDURE — 96375 TX/PRO/DX INJ NEW DRUG ADDON: CPT

## 2025-04-15 PROCEDURE — 93005 ELECTROCARDIOGRAM TRACING: CPT | Performed by: EMERGENCY MEDICINE

## 2025-04-15 PROCEDURE — 96374 THER/PROPH/DIAG INJ IV PUSH: CPT

## 2025-04-15 PROCEDURE — 83880 ASSAY OF NATRIURETIC PEPTIDE: CPT

## 2025-04-15 PROCEDURE — 6360000004 HC RX CONTRAST MEDICATION: Performed by: PHYSICIAN ASSISTANT

## 2025-04-15 PROCEDURE — 85379 FIBRIN DEGRADATION QUANT: CPT

## 2025-04-15 PROCEDURE — 6360000002 HC RX W HCPCS: Performed by: PHYSICIAN ASSISTANT

## 2025-04-15 PROCEDURE — 71275 CT ANGIOGRAPHY CHEST: CPT

## 2025-04-15 RX ORDER — METOCLOPRAMIDE 5 MG/1
5 TABLET ORAL 4 TIMES DAILY
Qty: 30 TABLET | Refills: 3 | Status: SHIPPED | OUTPATIENT
Start: 2025-04-15

## 2025-04-15 RX ORDER — IOPAMIDOL 755 MG/ML
100 INJECTION, SOLUTION INTRAVASCULAR
Status: COMPLETED | OUTPATIENT
Start: 2025-04-15 | End: 2025-04-15

## 2025-04-15 RX ORDER — BACLOFEN 10 MG/1
10 TABLET ORAL
Status: COMPLETED | OUTPATIENT
Start: 2025-04-15 | End: 2025-04-15

## 2025-04-15 RX ORDER — PANTOPRAZOLE SODIUM 20 MG/1
20 TABLET, DELAYED RELEASE ORAL DAILY
Qty: 30 TABLET | Refills: 3 | Status: SHIPPED | OUTPATIENT
Start: 2025-04-15

## 2025-04-15 RX ORDER — METOCLOPRAMIDE HYDROCHLORIDE 5 MG/ML
10 INJECTION INTRAMUSCULAR; INTRAVENOUS
Status: COMPLETED | OUTPATIENT
Start: 2025-04-15 | End: 2025-04-15

## 2025-04-15 RX ADMIN — IOPAMIDOL 80 ML: 755 INJECTION, SOLUTION INTRAVENOUS at 20:53

## 2025-04-15 RX ADMIN — SODIUM CHLORIDE 40 MG: 9 INJECTION INTRAMUSCULAR; INTRAVENOUS; SUBCUTANEOUS at 19:13

## 2025-04-15 RX ADMIN — BACLOFEN 10 MG: 10 TABLET ORAL at 22:26

## 2025-04-15 RX ADMIN — LIDOCAINE HYDROCHLORIDE 40 ML: 20 SOLUTION ORAL at 23:02

## 2025-04-15 RX ADMIN — FAMOTIDINE 20 MG: 10 INJECTION, SOLUTION INTRAVENOUS at 22:27

## 2025-04-15 RX ADMIN — METOCLOPRAMIDE 10 MG: 5 INJECTION, SOLUTION INTRAMUSCULAR; INTRAVENOUS at 19:13

## 2025-04-15 ASSESSMENT — PAIN - FUNCTIONAL ASSESSMENT: PAIN_FUNCTIONAL_ASSESSMENT: NONE - DENIES PAIN

## 2025-04-15 NOTE — ED TRIAGE NOTES
Patient arrives via private vehicle for shortness of breath. Patient states that their pain/symptoms started 2 days ago. Patient states that is tough to keep his breath without holding his head back...     Patient endorses a medical history of: extensive medical history.    A&Ox4.

## 2025-04-16 PROCEDURE — 6370000000 HC RX 637 (ALT 250 FOR IP): Performed by: PHYSICIAN ASSISTANT

## 2025-04-16 RX ORDER — LIDOCAINE HYDROCHLORIDE 20 MG/ML
SOLUTION OROPHARYNGEAL
Qty: 100 ML | Refills: 0 | Status: SHIPPED | OUTPATIENT
Start: 2025-04-16

## 2025-04-16 RX ADMIN — LIDOCAINE HYDROCHLORIDE 40 ML: 20 SOLUTION ORAL at 00:30

## 2025-04-16 NOTE — ED PROVIDER NOTES
[facility]  EMERGENCY DEPARTMENT ENCOUNTER       Pt Name: Julien Avila  MRN: [id number]  Birthdate 1945  Date of Evaluation: 4/15/2025  Provider: Yvette Proctor PA-C   PCP: Mandy Hanson APRN - NP  Note Started: 8:42 PM 4/15/25     CHIEF COMPLAINT       Chief Complaint   Patient presents with    Shortness of Breath        HISTORY OF PRESENT ILLNESS: 1 or more elements      History From: Patient  None     Julien Avila is a 79 y.o. male who presents to the ED today with shortness of breath and hiccups.  Denies chest pain.  Patient is on anticoagulation.  He has not had a fever.  Patient states he had seen his primary care who did not know what was causing his hiccups.  They seem to have become worse and therefore he came here.     Nursing Notes were all reviewed and agreed with or any disagreements were addressed in the HPI.     REVIEW OF SYSTEMS      Review of Systems     Positives and Pertinent negatives as per HPI.    PAST HISTORY     Past Medical History:  Past Medical History:   Diagnosis Date    Acid reflux     Adverse effect of anesthesia     hangover from anes    Arthritis     Atrial fibrillation (HCC)     Paroxysmal    Cancer (HCC)     skin    Chest pain 2/9/2011    DVT of lower extremity (deep venous thrombosis) (HCC)     Right, after horse stepped on me    Hyperlipidemia     Hypertension     Hypopotassemia 6/6/2013    IBS (irritable bowel syndrome)     constipation    Long term (current) use of anticoagulants     LVH (left ventricular hypertrophy)     Near syncope 3/30/2014    Osteoarthritis of right hip 2/15/2021    Other and unspecified hyperlipidemia 1/28/2014    Psychiatric disorder     anxiety    Sleep apnea     advised to bring CPAP day of surgery    SOB (shortness of breath) 2/10/2011       Past Surgical History:  Past Surgical History:   Procedure Laterality Date    CATARACT REMOVAL Bilateral 4-28-15    HERNIA REPAIR      Inguinal and umbilical    KNEE

## 2025-04-17 ENCOUNTER — HOSPITAL ENCOUNTER (OUTPATIENT)
Facility: HOSPITAL | Age: 80
Discharge: HOME | End: 2025-04-19
Payer: MEDICARE

## 2025-04-17 VITALS
WEIGHT: 240 LBS | DIASTOLIC BLOOD PRESSURE: 77 MMHG | HEART RATE: 71 BPM | SYSTOLIC BLOOD PRESSURE: 129 MMHG | BODY MASS INDEX: 35.55 KG/M2 | HEIGHT: 69 IN

## 2025-04-17 DIAGNOSIS — I48.0 PAROXYSMAL ATRIAL FIBRILLATION (HCC): ICD-10-CM

## 2025-04-17 LAB
EKG ATRIAL RATE: 78 BPM
EKG DIAGNOSIS: NORMAL
EKG P AXIS: 39 DEGREES
EKG P-R INTERVAL: 184 MS
EKG Q-T INTERVAL: 430 MS
EKG QRS DURATION: 72 MS
EKG QTC CALCULATION (BAZETT): 490 MS
EKG R AXIS: -4 DEGREES
EKG T AXIS: 48 DEGREES
EKG VENTRICULAR RATE: 78 BPM

## 2025-04-17 PROCEDURE — 6360000004 HC RX CONTRAST MEDICATION: Performed by: INTERNAL MEDICINE

## 2025-04-17 PROCEDURE — C8929 TTE W OR WO FOL WCON,DOPPLER: HCPCS

## 2025-04-17 RX ORDER — DOBUTAMINE HYDROCHLORIDE 200 MG/100ML
INJECTION INTRAVENOUS
Status: DISPENSED
Start: 2025-04-17 | End: 2025-04-17

## 2025-04-17 RX ADMIN — SULFUR HEXAFLUORIDE 2 ML: 60.7; .19; .19 INJECTION, POWDER, LYOPHILIZED, FOR SUSPENSION INTRAVENOUS; INTRAVESICAL at 08:29

## 2025-04-17 NOTE — ED PROVIDER NOTES
ED Course as of 04/17/25 1213   Tue Apr 15, 2025   1716 NSR, HR 78, LAD, normal QRS, no ST changes [CK]   2053 Care transferred from CH[facility] Proctor to me at this time.  80yo M with h/o Afib on anticoagulation, presents with SOB and hiccoughs; felt better after reglan and protonix; pending second troponin and CTA chest and reassessment for final disposition with plan to d/c home if second trop and CTA negative.  [MD]   2208 Patient reexamined, states that he is having recurrent hiccups again, states it feels like his diaphragm is spasming and it causes him to have some difficulty breathing.  He initially had relief with Reglan and Protonix.  Will trial baclofen and Pepcid and reassess.  CTA of the chest and second troponin normal. [MD]      ED Course User Index  [CK] Niki Rogers,   [MD] Flor Luna, PA     MDM: 79-year-old male presented with shortness of breath related to hiccups, worse when he is sitting, better when he is standing and walking around, not short of breath otherwise and felt better with medications given, particularly GI cocktail, and hiccups resolved, particularly when he is standing up, not orthopneic.  VSS. Labs, CX and CTA chest normal. Afib rate controlled.  Though the differential is broad for his hiccups, suspect GI source, will start on PPI, Reglan, Maalox and lidocaine as needed and follow up with PCP. He is comfortable going home and will RTED if any changes or worsening.       ICD-10-CM    1. Shortness of breath  R06.02       2. Hiccups  R06.6              Medication List        START taking these medications      lidocaine viscous hcl 2 % Soln solution  Commonly known as: XYLOCAINE  Mix 10mL with 30mL Maalox and take by mouth every 4 hours as needed.     metoclopramide 5 MG tablet  Commonly known as: Reglan  Take 1 tablet by mouth 4 times daily     pantoprazole 20 MG tablet  Commonly known as: Protonix  Take 1 tablet by mouth daily            CONTINUE taking these

## 2025-04-18 LAB
ECHO AO ROOT DIAM: 3.5 CM
ECHO AO ROOT INDEX: 1.57 CM/M2
ECHO AV AREA PEAK VELOCITY: 4.2 CM2
ECHO AV AREA VTI: 3.3 CM2
ECHO AV AREA/BSA PEAK VELOCITY: 1.9 CM2/M2
ECHO AV AREA/BSA VTI: 1.5 CM2/M2
ECHO AV MEAN GRADIENT: 6 MMHG
ECHO AV MEAN VELOCITY: 1.2 M/S
ECHO AV PEAK GRADIENT: 9 MMHG
ECHO AV PEAK VELOCITY: 1.5 M/S
ECHO AV VELOCITY RATIO: 0.8
ECHO AV VTI: 35.6 CM
ECHO BSA: 2.3 M2
ECHO EST RA PRESSURE: 3 MMHG
ECHO LA DIAMETER INDEX: 1.75 CM/M2
ECHO LA DIAMETER: 3.9 CM
ECHO LA TO AORTIC ROOT RATIO: 1.11
ECHO LV E' LATERAL VELOCITY: 8.08 CM/S
ECHO LV E' SEPTAL VELOCITY: 8.67 CM/S
ECHO LV EF PHYSICIAN: 60 %
ECHO LV FRACTIONAL SHORTENING: 34 % (ref 28–44)
ECHO LV INTERNAL DIMENSION DIASTOLE INDEX: 1.84 CM/M2
ECHO LV INTERNAL DIMENSION DIASTOLIC: 4.1 CM (ref 4.2–5.9)
ECHO LV INTERNAL DIMENSION SYSTOLIC INDEX: 1.21 CM/M2
ECHO LV INTERNAL DIMENSION SYSTOLIC: 2.7 CM
ECHO LV IVSD: 1 CM (ref 0.6–1)
ECHO LV MASS 2D: 132.1 G (ref 88–224)
ECHO LV MASS INDEX 2D: 59.2 G/M2 (ref 49–115)
ECHO LV POSTERIOR WALL DIASTOLIC: 1 CM (ref 0.6–1)
ECHO LV RELATIVE WALL THICKNESS RATIO: 0.49
ECHO LVOT AREA: 4.9 CM2
ECHO LVOT AV VTI INDEX: 0.67
ECHO LVOT DIAM: 2.5 CM
ECHO LVOT MEAN GRADIENT: 4 MMHG
ECHO LVOT PEAK GRADIENT: 6 MMHG
ECHO LVOT PEAK VELOCITY: 1.2 M/S
ECHO LVOT STROKE VOLUME INDEX: 52.8 ML/M2
ECHO LVOT SV: 117.8 ML
ECHO LVOT VTI: 24 CM
ECHO MV A VELOCITY: 0.82 M/S
ECHO MV AREA VTI: 5.6 CM2
ECHO MV E DECELERATION TIME (DT): 295.9 MS
ECHO MV E VELOCITY: 0.6 M/S
ECHO MV E/A RATIO: 0.73
ECHO MV E/E' LATERAL: 7.43
ECHO MV E/E' RATIO (AVERAGED): 7.17
ECHO MV E/E' SEPTAL: 6.92
ECHO MV LVOT VTI INDEX: 0.88
ECHO MV MAX VELOCITY: 0.6 M/S
ECHO MV MEAN GRADIENT: 1 MMHG
ECHO MV MEAN VELOCITY: 0.3 M/S
ECHO MV PEAK GRADIENT: 2 MMHG
ECHO MV VTI: 21.2 CM
ECHO PV MAX VELOCITY: 1.3 M/S
ECHO PV MEAN GRADIENT: 3 MMHG
ECHO PV MEAN VELOCITY: 0.8 M/S
ECHO PV PEAK GRADIENT: 7 MMHG
ECHO RIGHT VENTRICULAR SYSTOLIC PRESSURE (RVSP): 15 MMHG
ECHO RV INTERNAL DIMENSION: 3.8 CM
ECHO RV TAPSE: 2 CM (ref 1.7–?)
ECHO RVOT MEAN GRADIENT: 2 MMHG
ECHO RVOT PEAK GRADIENT: 3 MMHG
ECHO RVOT PEAK VELOCITY: 0.9 M/S
ECHO RVOT VTI: 21 CM
ECHO TV REGURGITANT MAX VELOCITY: 1.75 M/S
ECHO TV REGURGITANT PEAK GRADIENT: 12 MMHG

## 2025-05-02 ENCOUNTER — TRANSCRIBE ORDERS (OUTPATIENT)
Facility: HOSPITAL | Age: 80
End: 2025-05-02

## 2025-05-02 DIAGNOSIS — I48.0 PAROXYSMAL ATRIAL FIBRILLATION (HCC): Primary | ICD-10-CM

## 2025-05-15 ENCOUNTER — HOSPITAL ENCOUNTER (OUTPATIENT)
Facility: HOSPITAL | Age: 80
Discharge: HOME | End: 2025-05-17
Payer: MEDICARE

## 2025-05-15 ENCOUNTER — HOSPITAL ENCOUNTER (OUTPATIENT)
Facility: HOSPITAL | Age: 80
Discharge: HOME | End: 2025-05-18
Payer: MEDICARE

## 2025-05-15 DIAGNOSIS — I48.0 PAROXYSMAL ATRIAL FIBRILLATION (HCC): ICD-10-CM

## 2025-05-15 LAB
EKG DIAGNOSIS: NORMAL
NUC STRESS EJECTION FRACTION: 62 %
STRESS BASELINE DIAS BP: 88 MMHG
STRESS BASELINE HR: 65 BPM
STRESS BASELINE SYS BP: 158 MMHG
STRESS ESTIMATED WORKLOAD: 1 METS
STRESS PEAK DIAS BP: 88 MMHG
STRESS PEAK SYS BP: 158 MMHG
STRESS PERCENT HR ACHIEVED: 56 %
STRESS POST PEAK HR: 80 BPM
STRESS RATE PRESSURE PRODUCT: NORMAL BPM*MMHG
STRESS TARGET HR: 143 BPM
TID: 1.25

## 2025-05-15 PROCEDURE — A9500 TC99M SESTAMIBI: HCPCS

## 2025-05-15 PROCEDURE — 6360000002 HC RX W HCPCS

## 2025-05-15 PROCEDURE — 78452 HT MUSCLE IMAGE SPECT MULT: CPT

## 2025-05-15 PROCEDURE — 93017 CV STRESS TEST TRACING ONLY: CPT

## 2025-05-15 PROCEDURE — 3430000000 HC RX DIAGNOSTIC RADIOPHARMACEUTICAL

## 2025-05-15 RX ORDER — TETRAKIS(2-METHOXYISOBUTYLISOCYANIDE)COPPER(I) TETRAFLUOROBORATE 1 MG/ML
28.3 INJECTION, POWDER, LYOPHILIZED, FOR SOLUTION INTRAVENOUS
Status: COMPLETED | OUTPATIENT
Start: 2025-05-15 | End: 2025-05-15

## 2025-05-15 RX ORDER — TETRAKIS(2-METHOXYISOBUTYLISOCYANIDE)COPPER(I) TETRAFLUOROBORATE 1 MG/ML
9 INJECTION, POWDER, LYOPHILIZED, FOR SOLUTION INTRAVENOUS
Status: COMPLETED | OUTPATIENT
Start: 2025-05-15 | End: 2025-05-15

## 2025-05-15 RX ORDER — REGADENOSON 0.08 MG/ML
0.4 INJECTION, SOLUTION INTRAVENOUS
Status: COMPLETED | OUTPATIENT
Start: 2025-05-15 | End: 2025-05-15

## 2025-05-15 RX ADMIN — TETRAKIS(2-METHOXYISOBUTYLISOCYANIDE)COPPER(I) TETRAFLUOROBORATE 9 MILLICURIE: 1 INJECTION, POWDER, LYOPHILIZED, FOR SOLUTION INTRAVENOUS at 09:50

## 2025-05-15 RX ADMIN — REGADENOSON 0.4 MG: 0.08 INJECTION, SOLUTION INTRAVENOUS at 11:07

## 2025-05-15 RX ADMIN — TETRAKIS(2-METHOXYISOBUTYLISOCYANIDE)COPPER(I) TETRAFLUOROBORATE 28.3 MILLICURIE: 1 INJECTION, POWDER, LYOPHILIZED, FOR SOLUTION INTRAVENOUS at 11:08

## 2025-06-04 ENCOUNTER — HOSPITAL ENCOUNTER (OUTPATIENT)
Facility: HOSPITAL | Age: 80
Discharge: HOME | End: 2025-06-07
Payer: MEDICARE

## 2025-06-04 ENCOUNTER — TRANSCRIBE ORDERS (OUTPATIENT)
Facility: HOSPITAL | Age: 80
End: 2025-06-04

## 2025-06-04 DIAGNOSIS — I25.10 ATHEROSCLEROSIS OF NATIVE CORONARY ARTERY, UNSPECIFIED WHETHER ANGINA PRESENT, UNSPECIFIED WHETHER NATIVE OR TRANSPLANTED HEART: Primary | ICD-10-CM

## 2025-06-04 DIAGNOSIS — I48.0 PAROXYSMAL ATRIAL FIBRILLATION (HCC): ICD-10-CM

## 2025-06-04 DIAGNOSIS — I25.10 ATHEROSCLEROSIS OF NATIVE CORONARY ARTERY, UNSPECIFIED WHETHER ANGINA PRESENT, UNSPECIFIED WHETHER NATIVE OR TRANSPLANTED HEART: ICD-10-CM

## 2025-06-04 LAB
ALBUMIN SERPL-MCNC: 4 G/DL (ref 3.4–5)
ALBUMIN/GLOB SERPL: 1.4 (ref 0.8–1.7)
ALP SERPL-CCNC: 62 U/L (ref 45–117)
ALT SERPL-CCNC: 21 U/L (ref 10–50)
ANION GAP SERPL CALC-SCNC: 13 MMOL/L (ref 3–18)
AST SERPL-CCNC: 24 U/L (ref 10–38)
BASOPHILS # BLD: 0.13 K/UL (ref 0–0.1)
BASOPHILS NFR BLD: 1.5 % (ref 0–2)
BILIRUB SERPL-MCNC: 0.6 MG/DL (ref 0.2–1)
BUN SERPL-MCNC: 13 MG/DL (ref 6–23)
BUN/CREAT SERPL: 13 (ref 12–20)
CALCIUM SERPL-MCNC: 9.8 MG/DL (ref 8.5–10.1)
CHLORIDE SERPL-SCNC: 99 MMOL/L (ref 98–107)
CO2 SERPL-SCNC: 27 MMOL/L (ref 21–32)
CREAT SERPL-MCNC: 1.04 MG/DL (ref 0.6–1.3)
DIFFERENTIAL METHOD BLD: ABNORMAL
EOSINOPHIL # BLD: 0.49 K/UL (ref 0–0.4)
EOSINOPHIL NFR BLD: 5.6 % (ref 0–5)
ERYTHROCYTE [DISTWIDTH] IN BLOOD BY AUTOMATED COUNT: 13.6 % (ref 11.6–14.5)
GLOBULIN SER CALC-MCNC: 2.8 G/DL (ref 2–4)
GLUCOSE SERPL-MCNC: 97 MG/DL (ref 74–108)
HCT VFR BLD AUTO: 47.7 % (ref 36–48)
HGB BLD-MCNC: 15.1 G/DL (ref 13–16)
IMM GRANULOCYTES # BLD AUTO: 0.03 K/UL (ref 0–0.04)
IMM GRANULOCYTES NFR BLD AUTO: 0.3 % (ref 0–0.5)
INR PPP: 1.5 (ref 0.9–1.1)
LYMPHOCYTES # BLD: 2.22 K/UL (ref 0.9–3.3)
LYMPHOCYTES NFR BLD: 25.4 % (ref 21–52)
MCH RBC QN AUTO: 29.5 PG (ref 24–34)
MCHC RBC AUTO-ENTMCNC: 31.7 G/DL (ref 31–37)
MCV RBC AUTO: 93.3 FL (ref 78–100)
MONOCYTES # BLD: 0.8 K/UL (ref 0.05–1.2)
MONOCYTES NFR BLD: 9.2 % (ref 3–10)
NEUTS SEG # BLD: 5.06 K/UL (ref 1.8–8)
NEUTS SEG NFR BLD: 58 % (ref 40–73)
NRBC # BLD: 0 K/UL (ref 0–0.01)
NRBC BLD-RTO: 0 PER 100 WBC
PLATELET # BLD AUTO: 223 K/UL (ref 135–420)
PMV BLD AUTO: 9.8 FL (ref 9.2–11.8)
POTASSIUM SERPL-SCNC: 4.3 MMOL/L (ref 3.5–5.5)
PROT SERPL-MCNC: 6.8 G/DL (ref 6.4–8.2)
PROTHROMBIN TIME: 18.6 SEC (ref 11.9–14.9)
RBC # BLD AUTO: 5.11 M/UL (ref 4.35–5.65)
SODIUM SERPL-SCNC: 138 MMOL/L (ref 136–145)
WBC # BLD AUTO: 8.7 K/UL (ref 4.6–13.2)

## 2025-06-04 PROCEDURE — 85025 COMPLETE CBC W/AUTO DIFF WBC: CPT

## 2025-06-04 PROCEDURE — 80053 COMPREHEN METABOLIC PANEL: CPT

## 2025-06-04 PROCEDURE — 85610 PROTHROMBIN TIME: CPT

## 2025-06-26 RX ORDER — SPIRONOLACTONE 25 MG/1
12.5 TABLET ORAL DAILY
COMMUNITY

## 2025-06-26 RX ORDER — ACETAMINOPHEN 325 MG/1
650 TABLET ORAL EVERY 6 HOURS PRN
COMMUNITY

## 2025-06-26 RX ORDER — OXYCODONE HYDROCHLORIDE 10 MG/1
10 TABLET ORAL EVERY 6 HOURS PRN
COMMUNITY

## 2025-06-26 RX ORDER — FUROSEMIDE 40 MG/1
40 TABLET ORAL DAILY
COMMUNITY

## 2025-06-26 RX ORDER — ISOSORBIDE DINITRATE 10 MG/1
5 TABLET ORAL 2 TIMES DAILY
Status: ON HOLD | COMMUNITY
End: 2025-06-27 | Stop reason: HOSPADM

## 2025-06-27 ENCOUNTER — HOSPITAL ENCOUNTER (OUTPATIENT)
Facility: HOSPITAL | Age: 80
Setting detail: OUTPATIENT SURGERY
Discharge: HOME | End: 2025-06-27
Attending: INTERNAL MEDICINE | Admitting: INTERNAL MEDICINE
Payer: MEDICARE

## 2025-06-27 VITALS
SYSTOLIC BLOOD PRESSURE: 132 MMHG | TEMPERATURE: 98.2 F | RESPIRATION RATE: 20 BRPM | WEIGHT: 247 LBS | DIASTOLIC BLOOD PRESSURE: 78 MMHG | OXYGEN SATURATION: 95 % | HEIGHT: 69 IN | BODY MASS INDEX: 36.58 KG/M2 | HEART RATE: 68 BPM

## 2025-06-27 DIAGNOSIS — I25.119 CORONARY ARTERY DISEASE WITH ANGINA PECTORIS: ICD-10-CM

## 2025-06-27 DIAGNOSIS — R94.39 ABNORMAL STRESS ECHO: ICD-10-CM

## 2025-06-27 LAB — ECHO BSA: 2.34 M2

## 2025-06-27 PROCEDURE — 6360000004 HC RX CONTRAST MEDICATION: Performed by: INTERNAL MEDICINE

## 2025-06-27 PROCEDURE — 6370000000 HC RX 637 (ALT 250 FOR IP): Performed by: INTERNAL MEDICINE

## 2025-06-27 PROCEDURE — 93458 L HRT ARTERY/VENTRICLE ANGIO: CPT | Performed by: INTERNAL MEDICINE

## 2025-06-27 PROCEDURE — C1894 INTRO/SHEATH, NON-LASER: HCPCS | Performed by: INTERNAL MEDICINE

## 2025-06-27 PROCEDURE — C1769 GUIDE WIRE: HCPCS | Performed by: INTERNAL MEDICINE

## 2025-06-27 PROCEDURE — 2500000003 HC RX 250 WO HCPCS: Performed by: INTERNAL MEDICINE

## 2025-06-27 PROCEDURE — 99152 MOD SED SAME PHYS/QHP 5/>YRS: CPT | Performed by: INTERNAL MEDICINE

## 2025-06-27 PROCEDURE — 6360000002 HC RX W HCPCS: Performed by: INTERNAL MEDICINE

## 2025-06-27 PROCEDURE — 2580000003 HC RX 258: Performed by: INTERNAL MEDICINE

## 2025-06-27 PROCEDURE — 2709999900 HC NON-CHARGEABLE SUPPLY: Performed by: INTERNAL MEDICINE

## 2025-06-27 RX ORDER — ASCORBIC ACID 500 MG
500 TABLET ORAL DAILY
COMMUNITY

## 2025-06-27 RX ORDER — SODIUM CHLORIDE 9 MG/ML
INJECTION, SOLUTION INTRAVENOUS PRN
Status: DISCONTINUED | OUTPATIENT
Start: 2025-06-27 | End: 2025-06-27 | Stop reason: HOSPADM

## 2025-06-27 RX ORDER — AMIODARONE HYDROCHLORIDE 200 MG/1
100 TABLET ORAL DAILY
Qty: 15 TABLET | Refills: 0 | OUTPATIENT
Start: 2025-06-27

## 2025-06-27 RX ORDER — SODIUM CHLORIDE 0.9 % (FLUSH) 0.9 %
5-40 SYRINGE (ML) INJECTION PRN
Status: DISCONTINUED | OUTPATIENT
Start: 2025-06-27 | End: 2025-06-27 | Stop reason: HOSPADM

## 2025-06-27 RX ORDER — MIDAZOLAM HYDROCHLORIDE 1 MG/ML
INJECTION INTRAMUSCULAR; INTRAVENOUS PRN
Status: DISCONTINUED | OUTPATIENT
Start: 2025-06-27 | End: 2025-06-27 | Stop reason: HOSPADM

## 2025-06-27 RX ORDER — IOPAMIDOL 612 MG/ML
INJECTION, SOLUTION INTRAVASCULAR PRN
Status: DISCONTINUED | OUTPATIENT
Start: 2025-06-27 | End: 2025-06-27 | Stop reason: HOSPADM

## 2025-06-27 RX ORDER — VERAPAMIL HYDROCHLORIDE 2.5 MG/ML
INJECTION INTRAVENOUS PRN
Status: DISCONTINUED | OUTPATIENT
Start: 2025-06-27 | End: 2025-06-27 | Stop reason: HOSPADM

## 2025-06-27 RX ORDER — ACETAMINOPHEN 325 MG/1
650 TABLET ORAL EVERY 4 HOURS PRN
Status: DISCONTINUED | OUTPATIENT
Start: 2025-06-27 | End: 2025-06-27 | Stop reason: HOSPADM

## 2025-06-27 RX ORDER — ASPIRIN 81 MG/1
TABLET, CHEWABLE ORAL PRN
Status: DISCONTINUED | OUTPATIENT
Start: 2025-06-27 | End: 2025-06-27 | Stop reason: HOSPADM

## 2025-06-27 RX ORDER — NAPROXEN SODIUM 220 MG/1
440 TABLET, FILM COATED ORAL 2 TIMES DAILY WITH MEALS
Status: ON HOLD | COMMUNITY
End: 2025-06-27 | Stop reason: HOSPADM

## 2025-06-27 RX ORDER — LIDOCAINE HYDROCHLORIDE 10 MG/ML
INJECTION, SOLUTION INFILTRATION; PERINEURAL PRN
Status: DISCONTINUED | OUTPATIENT
Start: 2025-06-27 | End: 2025-06-27 | Stop reason: HOSPADM

## 2025-06-27 RX ORDER — SODIUM CHLORIDE 9 MG/ML
INJECTION, SOLUTION INTRAVENOUS CONTINUOUS
Status: DISPENSED | OUTPATIENT
Start: 2025-06-27 | End: 2025-06-27

## 2025-06-27 RX ORDER — HEPARIN SODIUM 1000 [USP'U]/ML
INJECTION, SOLUTION INTRAVENOUS; SUBCUTANEOUS PRN
Status: DISCONTINUED | OUTPATIENT
Start: 2025-06-27 | End: 2025-06-27 | Stop reason: HOSPADM

## 2025-06-27 RX ORDER — HEPARIN SODIUM 200 [USP'U]/100ML
INJECTION, SOLUTION INTRAVENOUS CONTINUOUS PRN
Status: COMPLETED | OUTPATIENT
Start: 2025-06-27 | End: 2025-06-27

## 2025-06-27 RX ORDER — LANOLIN ALCOHOL/MO/W.PET/CERES
2000 CREAM (GRAM) TOPICAL DAILY
COMMUNITY

## 2025-06-27 RX ORDER — SODIUM CHLORIDE 0.9 % (FLUSH) 0.9 %
5-40 SYRINGE (ML) INJECTION EVERY 12 HOURS SCHEDULED
Status: DISCONTINUED | OUTPATIENT
Start: 2025-06-27 | End: 2025-06-27 | Stop reason: HOSPADM

## 2025-06-27 RX ADMIN — SODIUM CHLORIDE: 0.9 INJECTION, SOLUTION INTRAVENOUS at 08:58

## 2025-06-27 NOTE — BRIEF OP NOTE
Brief Postoperative Note      Patient: Julien Avila  YOB: 1945  MRN: [id number]    Date of Procedure: 6/27/2025    Pre-Op Diagnosis Codes:      * Coronary artery disease with angina pectoris [I25.119]     * Abnormal stress echo [R94.39]    Post-Op Diagnosis: CAD       Procedure(s):  Left heart cath / coronary angiography    Surgeon(s):  Abdirizak Diaz MD    Assistant:  * No surgical staff found *    Anesthesia: IV Sedation    Estimated Blood Loss (mL): Minimal    Complications: None    Specimens:   * No specimens in log *    Implants:  * No implants in log *      Drains:   [REMOVED] Chest Tube Right Fourth intercostal space;Midaxillary (Removed)       [REMOVED] Closed/Suction Drain RUQ Bulb (Removed)       [REMOVED] External Urinary Catheter (Removed)       Findings:  Infection Present At Time Of Surgery (PATOS) (choose all levels that have infection present):  No infection present  Other Findings:   LHC and Coronary angiogram done via left radial approach.     Coronary angiogram revealed mild CAD    LV gram was not done. LVEDP 5 mm hg. No significant gradient on pull back.     Patient tolerated procedure well.     Scant blood loss.  No complications.  No specimen removed.     Recommendation:    Medication considered:   Aspirin, beta blocker, ACEI, Nitrates and statin     CAD risk factor education  Diet education  Control cholesterol  Blood pressure control  Exercise education: Age and functional status appropriate.    Electronically signed by Abdirizak Diaz MD on 6/27/2025 at 11:28 AM

## 2025-06-27 NOTE — DISCHARGE SUMMARY
Discharge Summary    Patient: Julien Avila MRN: [id number]  CSN: [id number]    YOB: 1945  Age: 79 y.o.  Sex: male    DOA: 6/27/2025 LOS:  LOS: 0 days   Discharge Date:      Primary Care Provider:  Mandy Hanson APRN - NP    Admission Diagnoses: Coronary artery disease with angina pectoris [I25.119]  Abnormal stress echo [R94.39]    Discharge Diagnoses:  CAD, PAF    Discharge Condition: Stable    Discharge Medications:        Medication List        CONTINUE taking these medications      acetaminophen 325 MG tablet  Commonly known as: TYLENOL     amiodarone 200 MG tablet  Commonly known as: CORDARONE  Take 0.5 tablets by mouth daily     DULoxetine 60 MG extended release capsule  Commonly known as: CYMBALTA     furosemide 40 MG tablet  Commonly known as: LASIX     indapamide 2.5 MG tablet  Commonly known as: LOZOL     lidocaine viscous hcl 2 % Soln solution  Commonly known as: XYLOCAINE  Mix 10mL with 30mL Maalox and take by mouth every 4 hours as needed.     metoclopramide 5 MG tablet  Commonly known as: Reglan  Take 1 tablet by mouth 4 times daily     metoprolol tartrate 25 MG tablet  Commonly known as: LOPRESSOR     Mounjaro 2.5 MG/0.5ML Sopn SC injection  Generic drug: Tirzepatide     oxyCODONE HCl 10 MG immediate release tablet  Commonly known as: OXY-IR     pantoprazole 20 MG tablet  Commonly known as: Protonix  Take 1 tablet by mouth daily     potassium chloride 20 MEQ packet  Commonly known as: KLOR-CON     pravastatin 40 MG tablet  Commonly known as: PRAVACHOL     rivaroxaban 20 MG Tabs tablet  Commonly known as: XARELTO  Take 1 tablet by mouth daily     spironolactone 25 MG tablet  Commonly known as: ALDACTONE     therapeutic multivitamin-minerals tablet  Take 1 tablet by mouth daily     vitamin B-1 100 MG tablet  Commonly known as: THIAMINE  Take 1 tablet by mouth daily     vitamin B-12 1000 MCG tablet  Commonly known as: CYANOCOBALAMIN     vitamin C 500 MG tablet  Commonly known as:

## 2025-06-27 NOTE — PROGRESS NOTES
Pt is all prepped and ready for procedure. Declines watching cath video.    1030 Pt picked up for procedure.    1130 Pt back to the care unit s/p [facility]. Pt awake and alert and tolerated procedure well. Left wrist accessed and Vasc band in place to left wrist with immobilizer present. Site and vasc assessment wnl. Post op care and precautions reinforced.     1235 Initiating air removal from vasc band    1325 Vasc band removed and some oozing noted. Manual pressure applied for 10 minutes and bleeding stopped.   1335 Sterile 2x2 with tegaderm dressing applied to site. Immobilizer back in place. Pulses palpable.   1345 DR ruelas in to see patient    1430 Discharge instructions reviewed and patient verbalized all understandings.     1445 Pt escorted out via w/c and left with S.O in stable condition.

## 2025-06-27 NOTE — POST SEDATION
Sedation Post Procedure Note    Patient Name: Julien Avila   YOB: 1945  Room/Bed: [facility]/  Medical Record Number: [id number]  Date: 6/27/2025   Time: 11:28 AM         Physicians/Assistants: Abdirizak Diaz MD, MD    Procedure Performed:  LHC, coronary angiogram     Post-Sedation Vital Signs:  Vitals:    06/27/25 1124   BP: 134/74   Pulse: 67   Resp: 16   Temp:    SpO2: 97%      Vital signs were reviewed and were stable after the procedure (see flow sheet for vitals)            Post-Sedation Exam: Lungs: clear and Cardiovascular: normal           Complications: none    Electronically signed by Abdirizak Diaz MD on 6/27/2025 at 11:28 AM

## 2025-06-27 NOTE — INTERVAL H&P NOTE
Update History & Physical    The patient's History and Physical of June 4, 2025 was reviewed with the patient and I examined the patient. There was no change. The surgical site was confirmed by the patient and me.     Plan: The risks, benefits, expected outcome, and alternative to the recommended procedure have been discussed with the patient. Patient understands and wants to proceed with the procedure.     Electronically signed by Abdirizak Diaz MD on 6/27/2025 at 10:56 AM

## 2025-06-27 NOTE — DISCHARGE INSTRUCTIONS
HEART CATHETERIZATION/ANGIOGRAPHY DISCHARGE INSTRUCTIONS    Check puncture site frequently for swelling or bleeding. If there is any bleeding, lie down and apply pressure over the area with a clean towel or washcloth. Notify your doctor for any redness, swelling, drainage, or oozing from the puncture site. Notify your doctor for any fever or chills.  If the extremity becomes cold, numb, or painful call Dr. MORENO.  Activity should be limited for the next 48 hours. Climb stairs as little as possible and avoid any stooping, bending, or strenuous activity for 48 hours. No heavy lifting (anything over 10 pounds) for 3 days.  You may resume your usual diet. Drink more fluids than usual.  Have a responsible person drive you home and stay with you for at least 24 hours after your heart catheterization/angiography.  You may remove bandage from your Left and Arm in 24 hours. You may shower in 24 hours. No tub baths, hot tubs, or swimming for 1 week. Do not place any lotions, creams, powders, or ointments over puncture site for 1 week. You may place a clean band-aid over the puncture site each day for 5 days. Change daily.  I have read the above instructions and have had the opportunity to ask questions.      Patient: ________________________   Date: 6/27/2025    Witness: _______________________   Date: 6/27/2025     General Discharge: Care Instructions  Your Care Instructions     One or more doctors have given you a physical exam, reviewed your symptoms, and asked about your past medical problems. You have had tests as needed.  At this time, the doctors feel it is safe for you to go home.  It is very important that you follow up with your doctor as directed. If you continue to have symptoms, you may need more tests or treatment.  The doctor has checked you carefully, but problems can develop later. If you notice any problems or new symptoms,  get medical treatment right away.  Follow-up care is a key part of your treatment and

## (undated) DEVICE — [HIGH FLOW INSUFFLATOR,  DO NOT USE IF PACKAGE IS DAMAGED,  KEEP DRY,  KEEP AWAY FROM SUNLIGHT,  PROTECT FROM HEAT AND RADIOACTIVE SOURCES.]: Brand: PNEUMOSURE

## (undated) DEVICE — SHIELD RAD 14X16 IN W/ SCOOP ABSORBER

## (undated) DEVICE — CATHETER DIAG 5FR L100CM JR3.5 CRV SZ DBL BRAID WIRE SFT

## (undated) DEVICE — SPLINT WR VELC FOAM NEUT POS DISP FOR RAD ART ACC SFT STRP

## (undated) DEVICE — SUTURE MONOCRYL + SZ 4-0 L27IN ABSRB UD L19MM PS-2 3/8 CIR MCP426H

## (undated) DEVICE — AGENT HEMSTAT 3GM OXIDIZED REGENERATED CELOS ABSRB FOR CONT (ORDER MULTIPLES OF 5EA)

## (undated) DEVICE — SET TBNG DISP TIP FOR AHTO

## (undated) DEVICE — LAPAROSCOPIC TROCAR SLEEVE/SINGLE USE: Brand: KII® OPTICAL ACCESS SYSTEM

## (undated) DEVICE — SOL IRRIGATION INJ NACL 0.9% 500ML BTL

## (undated) DEVICE — PACK PROCEDURE SURG CATH CUST

## (undated) DEVICE — DRAPE EP LT SUBCLAV ENTRY SHLD SORBX

## (undated) DEVICE — HANDPIECE SET WITH HIGH FLOW TIP AND SUCTION TUBE: Brand: INTERPULSE

## (undated) DEVICE — LAP CHOLE: Brand: MEDLINE INDUSTRIES, INC.

## (undated) DEVICE — APPLICATOR MEDICATED 26 CC SOLUTION HI LT ORNG CHLORAPREP

## (undated) DEVICE — CUTTER ENDOSCP L340MM LIN ARTC SGL STROKE FIRING ENDOPATH

## (undated) DEVICE — RELOAD STPL H1-2.5X45MM VASC THN TISS WHT 6 ROW B FRM SGL

## (undated) DEVICE — SENSOR PLSE OXMTR AD CBL L36IN ADH FRM FIT SPO2 DISP

## (undated) DEVICE — SUT VCRL + 1 36IN CT1 VIO --

## (undated) DEVICE — DRAPE,ANGIO,BRACH,STERILE,38X44: Brand: MEDLINE

## (undated) DEVICE — SPLINT WR POS F/ARTERIAL ACC -- BX/10

## (undated) DEVICE — GLIDESHEATH SLENDER STAINLESS STEEL KIT: Brand: GLIDESHEATH SLENDER

## (undated) DEVICE — TROCARS: Brand: KII® BALLOON BLUNT TIP SYSTEM

## (undated) DEVICE — SOLUTION IRRIG 500ML 0.9% SOD CHLO USP POUR PLAS BTL

## (undated) DEVICE — STRAP,POSITIONING,KNEE/BODY,FOAM,4X60": Brand: MEDLINE

## (undated) DEVICE — SPONGE GZ W4XL4IN COT 12 PLY TYP VII WVN C FLD DSGN STERILE

## (undated) DEVICE — SUTURE NONABSORBABLE MONOFILAMENT 3-0 PS-1 18 IN BLK ETHILON 1663H

## (undated) DEVICE — SOLUTION ANTIFOG VIS SYS CLEARIFY LAPSCP

## (undated) DEVICE — CATHETER DIAG 5FR L100CM LUMN ID0.047IN JL3.5 CRV 0 SIDE H

## (undated) DEVICE — KIT SUTURING DEVICE M-CLOSE

## (undated) DEVICE — TROCAR: Brand: KII® SLEEVE

## (undated) DEVICE — GUIDEWIRE VASC L260CM DIA0.035IN RAD 3MM J TIP L7CM PTFE

## (undated) DEVICE — BIPOLAR SEALER 23-301-1 AQM MBS: Brand: AQUAMANTYS™

## (undated) DEVICE — STOPCOCK TRNSDUC 500PSI 3 W ROT M LUER LT BLU OFF HNDL R

## (undated) DEVICE — 4-PORT MANIFOLD: Brand: NEPTUNE 2

## (undated) DEVICE — SOL INJ L R 1000ML BG --

## (undated) DEVICE — Device

## (undated) DEVICE — ZIP 16 SURGICAL SKIN CLOSURE DEVICE: Brand: ZIP 16 SURGICAL SKIN CLOSURE DEVICE

## (undated) DEVICE — PRESSURE MONITORING SET: Brand: TRUWAVE

## (undated) DEVICE — GLOVE SURG SZ 7 L12IN FNGR THK79MIL GRN LTX FREE

## (undated) DEVICE — GARMENT,MEDLINE,DVT,INT,CALF,MED, GEN2: Brand: MEDLINE

## (undated) DEVICE — TISSUE RETRIEVAL SYSTEM: Brand: INZII RETRIEVAL SYSTEM

## (undated) DEVICE — ELECTRODES PAIR QUIK COMBO CONN RTS DEFIB

## (undated) DEVICE — Z DISCONTINUED USE 2889526 SHEARS ENDOSCP L36CM DIA5MM ULTRASONIC CRV TIP W/ ADV

## (undated) DEVICE — BAND RADIAL COMPR ARTERY 27CM -- LNG BX/10

## (undated) DEVICE — PROCEDURE KIT FLUID MGMT 10 FR CUST MAINFOLD

## (undated) DEVICE — NEEDLE SPNL 20GA L3.5IN YEL HUB S STL REG WALL FIT STYL W/

## (undated) DEVICE — LIQUIBAND RAPID ADHESIVE 36/CS 0.8ML: Brand: MEDLINE

## (undated) DEVICE — SUTURE VICRYL + SZ 0 L27IN ABSRB VLT L26MM UR-6 5/8 CIR VCP603H

## (undated) DEVICE — SUT VCRL + 2-0 36IN CT1 UD --

## (undated) DEVICE — ANGIOGRAPHIC CATHETER: Brand: EXPO™

## (undated) DEVICE — BLADE SAW 1.27X13X90 MM FOR LG BNE

## (undated) DEVICE — GUIDEWIRE VASC L260CM DIA0.035IN TIP L3MM STD EXCHG PTFE J

## (undated) DEVICE — DRESSING ALG W4XL8IN AG FOAM SUPERABSORBENT SIL ANTIMIC

## (undated) DEVICE — CATHETER ANGIO NTR 0.045 INX5 FRX100 CM THRULUMEN EXPO

## (undated) DEVICE — THE CANADY HYBRID PLASMA SCALPEL IS AN ELECTROSURGICAL PLASMA SCALPEL THAT USES AN 85MM BENDABLE PADDLE BLADE TIP. THE ELECTROSURGICAL PLASMA SCALPEL IS USED TO SIMULTANEOUSLY CUT AND COAGULATE BIOLOGICAL TISSUE.: Brand: CANADY HYBRID PLASMA PADDLE BLADE

## (undated) DEVICE — TUBING PRSS MON L24IN PVC RIG NONEXPANDING M TO FEM CONN

## (undated) DEVICE — GLOVE SURG SZ 65 CRM LTX FREE POLYISOPRENE POLYMER BEAD ANTI

## (undated) DEVICE — PACK PROCEDURE SURG TOT HIP ANTR CARTER CUST

## (undated) DEVICE — DEVICE COMPR LNG 27 CM VASC BND